# Patient Record
Sex: MALE | Race: BLACK OR AFRICAN AMERICAN | NOT HISPANIC OR LATINO | URBAN - METROPOLITAN AREA
[De-identification: names, ages, dates, MRNs, and addresses within clinical notes are randomized per-mention and may not be internally consistent; named-entity substitution may affect disease eponyms.]

---

## 2018-02-11 ENCOUNTER — INPATIENT (INPATIENT)
Facility: HOSPITAL | Age: 83
LOS: 2 days | Discharge: NURSING, REHAB OR LONG-TERM CARE | End: 2018-02-14
Attending: INTERNAL MEDICINE | Admitting: INTERNAL MEDICINE
Payer: COMMERCIAL

## 2018-02-11 VITALS
OXYGEN SATURATION: 99 % | TEMPERATURE: 98 F | HEIGHT: 68 IN | SYSTOLIC BLOOD PRESSURE: 136 MMHG | WEIGHT: 154.1 LBS | DIASTOLIC BLOOD PRESSURE: 77 MMHG | HEART RATE: 76 BPM | RESPIRATION RATE: 18 BRPM

## 2018-02-11 DIAGNOSIS — Z98.89 OTHER SPECIFIED POSTPROCEDURAL STATES: Chronic | ICD-10-CM

## 2018-02-11 LAB
ALBUMIN SERPL ELPH-MCNC: 3.8 G/DL — SIGNIFICANT CHANGE UP (ref 3.3–5)
ALP SERPL-CCNC: 66 U/L — SIGNIFICANT CHANGE UP (ref 40–120)
ALT FLD-CCNC: 41 U/L — SIGNIFICANT CHANGE UP (ref 12–78)
ANION GAP SERPL CALC-SCNC: 8 MMOL/L — SIGNIFICANT CHANGE UP (ref 5–17)
APPEARANCE UR: CLEAR — SIGNIFICANT CHANGE UP
APTT BLD: 33.9 SEC — SIGNIFICANT CHANGE UP (ref 27.5–37.4)
AST SERPL-CCNC: 35 U/L — SIGNIFICANT CHANGE UP (ref 15–37)
BASOPHILS # BLD AUTO: 0.01 K/UL — SIGNIFICANT CHANGE UP (ref 0–0.2)
BASOPHILS NFR BLD AUTO: 0.2 % — SIGNIFICANT CHANGE UP (ref 0–2)
BILIRUB SERPL-MCNC: 0.6 MG/DL — SIGNIFICANT CHANGE UP (ref 0.2–1.2)
BILIRUB UR-MCNC: NEGATIVE — SIGNIFICANT CHANGE UP
BUN SERPL-MCNC: 36 MG/DL — HIGH (ref 7–23)
CALCIUM SERPL-MCNC: 9.5 MG/DL — SIGNIFICANT CHANGE UP (ref 8.5–10.1)
CHLORIDE SERPL-SCNC: 104 MMOL/L — SIGNIFICANT CHANGE UP (ref 96–108)
CK MB BLD-MCNC: 0.6 % — SIGNIFICANT CHANGE UP (ref 0–3.5)
CK MB CFR SERPL CALC: 2.7 NG/ML — SIGNIFICANT CHANGE UP (ref 0.5–3.6)
CK SERPL-CCNC: 454 U/L — HIGH (ref 26–308)
CO2 SERPL-SCNC: 31 MMOL/L — SIGNIFICANT CHANGE UP (ref 22–31)
COLOR SPEC: YELLOW — SIGNIFICANT CHANGE UP
CREAT SERPL-MCNC: 1.77 MG/DL — HIGH (ref 0.5–1.3)
DIFF PNL FLD: ABNORMAL
EOSINOPHIL # BLD AUTO: 0.05 K/UL — SIGNIFICANT CHANGE UP (ref 0–0.5)
EOSINOPHIL NFR BLD AUTO: 0.8 % — SIGNIFICANT CHANGE UP (ref 0–6)
GLUCOSE SERPL-MCNC: 88 MG/DL — SIGNIFICANT CHANGE UP (ref 70–99)
GLUCOSE UR QL: NEGATIVE MG/DL — SIGNIFICANT CHANGE UP
HCT VFR BLD CALC: 40.9 % — SIGNIFICANT CHANGE UP (ref 39–50)
HGB BLD-MCNC: 12.9 G/DL — LOW (ref 13–17)
IMM GRANULOCYTES NFR BLD AUTO: 0.2 % — SIGNIFICANT CHANGE UP (ref 0–1.5)
INR BLD: 1.09 RATIO — SIGNIFICANT CHANGE UP (ref 0.88–1.16)
KETONES UR-MCNC: NEGATIVE — SIGNIFICANT CHANGE UP
LEUKOCYTE ESTERASE UR-ACNC: NEGATIVE — SIGNIFICANT CHANGE UP
LYMPHOCYTES # BLD AUTO: 1.07 K/UL — SIGNIFICANT CHANGE UP (ref 1–3.3)
LYMPHOCYTES # BLD AUTO: 17.6 % — SIGNIFICANT CHANGE UP (ref 13–44)
MCHC RBC-ENTMCNC: 27.6 PG — SIGNIFICANT CHANGE UP (ref 27–34)
MCHC RBC-ENTMCNC: 31.5 GM/DL — LOW (ref 32–36)
MCV RBC AUTO: 87.4 FL — SIGNIFICANT CHANGE UP (ref 80–100)
MONOCYTES # BLD AUTO: 0.82 K/UL — SIGNIFICANT CHANGE UP (ref 0–0.9)
MONOCYTES NFR BLD AUTO: 13.5 % — SIGNIFICANT CHANGE UP (ref 2–14)
NEUTROPHILS # BLD AUTO: 4.11 K/UL — SIGNIFICANT CHANGE UP (ref 1.8–7.4)
NEUTROPHILS NFR BLD AUTO: 67.7 % — SIGNIFICANT CHANGE UP (ref 43–77)
NITRITE UR-MCNC: NEGATIVE — SIGNIFICANT CHANGE UP
OB PNL STL: POSITIVE
PH UR: 7 — SIGNIFICANT CHANGE UP (ref 5–8)
PLATELET # BLD AUTO: 136 K/UL — LOW (ref 150–400)
POTASSIUM SERPL-MCNC: 3.7 MMOL/L — SIGNIFICANT CHANGE UP (ref 3.5–5.3)
POTASSIUM SERPL-SCNC: 3.7 MMOL/L — SIGNIFICANT CHANGE UP (ref 3.5–5.3)
PROT SERPL-MCNC: 8.3 GM/DL — SIGNIFICANT CHANGE UP (ref 6–8.3)
PROT UR-MCNC: NEGATIVE MG/DL — SIGNIFICANT CHANGE UP
PROTHROM AB SERPL-ACNC: 11.9 SEC — SIGNIFICANT CHANGE UP (ref 9.8–12.7)
RBC # BLD: 4.68 M/UL — SIGNIFICANT CHANGE UP (ref 4.2–5.8)
RBC # FLD: 15.4 % — HIGH (ref 10.3–14.5)
SODIUM SERPL-SCNC: 143 MMOL/L — SIGNIFICANT CHANGE UP (ref 135–145)
SP GR SPEC: 1.01 — SIGNIFICANT CHANGE UP (ref 1.01–1.02)
TROPONIN I SERPL-MCNC: 0.02 NG/ML — SIGNIFICANT CHANGE UP (ref 0.01–0.04)
UROBILINOGEN FLD QL: NEGATIVE MG/DL — SIGNIFICANT CHANGE UP
WBC # BLD: 6.07 K/UL — SIGNIFICANT CHANGE UP (ref 3.8–10.5)
WBC # FLD AUTO: 6.07 K/UL — SIGNIFICANT CHANGE UP (ref 3.8–10.5)

## 2018-02-11 PROCEDURE — 93010 ELECTROCARDIOGRAM REPORT: CPT

## 2018-02-11 PROCEDURE — 70450 CT HEAD/BRAIN W/O DYE: CPT | Mod: 26

## 2018-02-11 PROCEDURE — 99285 EMERGENCY DEPT VISIT HI MDM: CPT

## 2018-02-11 PROCEDURE — 71045 X-RAY EXAM CHEST 1 VIEW: CPT | Mod: 26

## 2018-02-11 RX ORDER — SUCRALFATE 1 G
1 TABLET ORAL
Qty: 0 | Refills: 0 | Status: DISCONTINUED | OUTPATIENT
Start: 2018-02-11 | End: 2018-02-14

## 2018-02-11 RX ORDER — FERROUS SULFATE 325(65) MG
325 TABLET ORAL DAILY
Qty: 0 | Refills: 0 | Status: DISCONTINUED | OUTPATIENT
Start: 2018-02-11 | End: 2018-02-14

## 2018-02-11 RX ORDER — FUROSEMIDE 40 MG
20 TABLET ORAL DAILY
Qty: 0 | Refills: 0 | Status: DISCONTINUED | OUTPATIENT
Start: 2018-02-11 | End: 2018-02-14

## 2018-02-11 RX ORDER — PANTOPRAZOLE SODIUM 20 MG/1
40 TABLET, DELAYED RELEASE ORAL EVERY 12 HOURS
Qty: 0 | Refills: 0 | Status: DISCONTINUED | OUTPATIENT
Start: 2018-02-11 | End: 2018-02-14

## 2018-02-11 RX ORDER — ATORVASTATIN CALCIUM 80 MG/1
40 TABLET, FILM COATED ORAL AT BEDTIME
Qty: 0 | Refills: 0 | Status: DISCONTINUED | OUTPATIENT
Start: 2018-02-11 | End: 2018-02-14

## 2018-02-11 RX ORDER — BICALUTAMIDE 50 MG/1
50 TABLET, FILM COATED ORAL DAILY
Qty: 0 | Refills: 0 | Status: DISCONTINUED | OUTPATIENT
Start: 2018-02-11 | End: 2018-02-14

## 2018-02-11 RX ORDER — SPIRONOLACTONE 25 MG/1
25 TABLET, FILM COATED ORAL DAILY
Qty: 0 | Refills: 0 | Status: DISCONTINUED | OUTPATIENT
Start: 2018-02-11 | End: 2018-02-14

## 2018-02-11 RX ORDER — RAMIPRIL 5 MG
0 CAPSULE ORAL
Qty: 0 | Refills: 0 | COMMUNITY

## 2018-02-11 RX ORDER — SODIUM CHLORIDE 9 MG/ML
1000 INJECTION, SOLUTION INTRAVENOUS
Qty: 0 | Refills: 0 | Status: DISCONTINUED | OUTPATIENT
Start: 2018-02-11 | End: 2018-02-13

## 2018-02-11 RX ADMIN — BICALUTAMIDE 50 MILLIGRAM(S): 50 TABLET, FILM COATED ORAL at 18:56

## 2018-02-11 RX ADMIN — Medication 20 MILLIGRAM(S): at 18:56

## 2018-02-11 RX ADMIN — Medication 325 MILLIGRAM(S): at 18:56

## 2018-02-11 RX ADMIN — Medication 1 GRAM(S): at 18:56

## 2018-02-11 RX ADMIN — SPIRONOLACTONE 25 MILLIGRAM(S): 25 TABLET, FILM COATED ORAL at 18:56

## 2018-02-11 RX ADMIN — SODIUM CHLORIDE 50 MILLILITER(S): 9 INJECTION, SOLUTION INTRAVENOUS at 19:05

## 2018-02-11 RX ADMIN — PANTOPRAZOLE SODIUM 40 MILLIGRAM(S): 20 TABLET, DELAYED RELEASE ORAL at 18:56

## 2018-02-11 NOTE — ED ADULT NURSE NOTE - OBJECTIVE STATEMENT
Pt states he felt dizzy while walking using his walker, fell and passed out. pt denies any chest pain prior to syncopal episode. LE edema noted

## 2018-02-11 NOTE — H&P ADULT - ASSESSMENT
89 years old male who is admitted for syncope, GI bleeding, anemia, AMNA on CKD III  Plan: IV hydration. Anemia and syncope workup

## 2018-02-11 NOTE — ED ADULT TRIAGE NOTE - CHIEF COMPLAINT QUOTE
'I got a little dizzy and fell" report having gotten dizzy and then fell at home, unwitnessed. Denies loc, shortness of breath, chest pain.

## 2018-02-11 NOTE — ED PROVIDER NOTE - OBJECTIVE STATEMENT
89 y/o male with PMH HTN, CHF, duodenal ulcer, anemia here with aide c/o syncopal episode x 1 hour ago. pt states he was walking with his walker when he felt lightheaded as if he was going to pass out. he leaned against the wall and fell to the floor onto his buttocks and banged the back of his head against the wall. no LOC. pt currently has no complaints. denies fever, chills, cp, sob, HA, n/v    ROS: No fever/chills. No eye pain/changes in vision, No ear pain/sore throat/dysphagia, No chest pain/palpitations. No SOB/cough/. No abdominal pain, N/V/D, no black/bloody bm. No dysuria/frequency/discharge, No headache. No Dizziness.    No rashes or breaks in skin. No numbness/tingling/weakness. 89 y/o male with PMH HTN, CHF, duodenal ulcer, anemia here with aide c/o near syncopal episode x 1 hour ago. pt states he was walking with his walker when he felt lightheaded as if he was going to pass out. he leaned against the wall and fell to the floor onto his buttocks and banged the back of his head against the wall. no LOC. pt currently has no complaints. denies fever, chills, cp, sob, HA, n/v    ROS: No fever/chills. No eye pain/changes in vision, No ear pain/sore throat/dysphagia, No chest pain/palpitations. No SOB/cough/. No abdominal pain, N/V/D, no black/bloody bm. No dysuria/frequency/discharge, No headache. No Dizziness.    No rashes or breaks in skin. No numbness/tingling/weakness.

## 2018-02-11 NOTE — ED PROVIDER NOTE - PROGRESS NOTE DETAILS
Gordon: hgb stable and HD stable but occult blood positive, anterior st/t changes, hx of duodenal ulcer with syncope. will admit. Dr. Lay aware, likely needs GI/cardiology during admission

## 2018-02-11 NOTE — H&P ADULT - NSHPPHYSICALEXAM_GEN_ALL_CORE
HEENT: wnl  Neck: no JVD  Chest: wnl  Heart: S1, S2  Abdomen: non-tender  Ext: no edema  Neuro: AAO, no focal deficiet

## 2018-02-11 NOTE — ED PROVIDER NOTE - ATTENDING CONTRIBUTION TO CARE
Gordon: I performed a face to face bedside interview with patient regarding history of present illness, review of symptoms and past medical history. I completed an independent physical exam.  I have discussed patient's plan of care with advanced care provider.   I agree with note as stated above including HISTORY OF PRESENT ILLNESS, HIV, PAST MEDICAL/SURGICAL/FAMILY/SOCIAL HISTORY, ALLERGIES AND HOME MEDICATIONS, REVIEW OF SYSTEMS, PHYSICAL EXAM, MEDICAL DECISION MAKING and any PROGRESS NOTES during the time I functioned as the attending physician for this patient  unless otherwise noted. My brief assessment is as follows: near syncope (not full syncope) felt lightheaded, lost balance and fell, states bumped head, no loc at any point. no a/c, no melena/brbpr. last presentation like this had anemia/duodenal ulcer. now without complaints and feels well, with aid who states pt at baseline, lives with aid (part time) and his son. lungs clear, rrr, neuro intact. labs, occult blood, cth, cxr. ekg nsr without ischemic change. if neg work up with d/c with close f/u. Gordon: I performed a face to face bedside interview with patient regarding history of present illness, review of symptoms and past medical history. I completed an independent physical exam.  I have discussed patient's plan of care with advanced care provider.   I agree with note as stated above including HISTORY OF PRESENT ILLNESS, HIV, PAST MEDICAL/SURGICAL/FAMILY/SOCIAL HISTORY, ALLERGIES AND HOME MEDICATIONS, REVIEW OF SYSTEMS, PHYSICAL EXAM, MEDICAL DECISION MAKING and any PROGRESS NOTES during the time I functioned as the attending physician for this patient  unless otherwise noted. My brief assessment is as follows: near syncope (not full syncope) felt lightheaded, lost balance and fell, states bumped head, no loc at any point. no a/c, no melena/brbpr. last presentation like this had anemia/duodenal ulcer. now without complaints and feels well, with aid who states pt at baseline, lives with aid (part time) and his son. lungs clear, rrr, neuro intact. labs, occult blood, cth, cxr. ekg nsr with st depression/twi v2-v6. . likely admit for near syncope/cardiac work up +/- gi

## 2018-02-11 NOTE — H&P ADULT - NSHPLABSRESULTS_GEN_ALL_CORE
12.9   6.07  )-----------( 136      ( 2018 14:13 )             40.9         143  |  104  |  36<H>  ----------------------------<  88  3.7   |  31  |  1.77<H>    Ca    9.5      2018 14:13    TPro  8.3  /  Alb  3.8  /  TBili  0.6  /  DBili  x   /  AST  35  /  ALT  41  /  AlkPhos  66      PT/INR - ( 2018 14:13 )   PT: 11.9 sec;   INR: 1.09 ratio         PTT - ( 2018 14:13 )  PTT:33.9 sec  Urinalysis Basic - ( 2018 14:22 )    Color: Yellow / Appearance: Clear / S.010 / pH: x  Gluc: x / Ketone: Negative  / Bili: Negative / Urobili: Negative mg/dL   Blood: x / Protein: Negative mg/dL / Nitrite: Negative   Leuk Esterase: Negative / RBC: 25-50 /HPF / WBC x   Sq Epi: x / Non Sq Epi: x / Bacteria: x      CAPILLARY BLOOD GLUCOSE    < from: CT Head No Cont (18 @ 13:16) >    EXAM:  CT BRAIN                            PROCEDURE DATE:  2018          INTERPRETATION:  CT HEAD WITHOUT CONTRAST    CLINICAL STATEMENT: syncope.    COMPARISON: None available.    TECHNIQUE: Noncontrast axial CT head was obtained from the skull base to   vertex.    FINDINGS:  There is no evidence of acute intracranial hemorrhage, mass effect or   midline shift. No CT evidence of acute large territory vascular infarct.   The ventricles and cortical sulci are prominent reflecting parenchymal   volume loss. Scattered hypodensities in the periventricular white matter   are nonspecific, but likely sequela of small vessel ischemic disease.   Intracranial atherosclerotic calcifications are present. Partially empty   sella is noted.    Age-indeterminate lacunar infarct in the right corona radiata.    There appear to be chronic lacunar infarcts in the bilateral thalami.    The visualized paranasal sinuses and mastoid air cells are well aerated.   No displaced calvarial fracture.    IMPRESSION:  No acute intracranial hemorrhage, mass effect or midline shift.    Age-indeterminate lacunar infarct in the right corona radiata.    Further evaluation with MRI may be performed as clinically indicated.                ADDIS THOMAS MD., ATTENDING RADIOLOGIST  This document has been electronically signed. 2018  1:28PM                < end of copied text >

## 2018-02-11 NOTE — ED PROVIDER NOTE - PHYSICAL EXAMINATION
Gen: Alert, NAD  Head: NC, AT, PERRL, EOMI, normal lids/conjunctiva  ENT: B TM WNL, normal hearing, patent oropharynx without erythema/exudate, uvula midline  Neck: +supple, no tenderness/meningismus/JVD, +Trachea midline  Pulm: Bilateral BS, normal resp effort, no wheeze/stridor/retractions  CV: RRR, no M/R/G, +dist pulses  Abd: soft, NT/ND, +BS, no hepatosplenomegaly  Mskel: +edema b/l no erythema/cyanosis  Skin: no rash  Neuro: AAOx3, no sensory/motor deficits, CN 2-12 intact

## 2018-02-11 NOTE — ED PROVIDER NOTE - MEDICAL DECISION MAKING DETAILS
89 y/o male here with syncopal episode. pt currently has no complaints. will do cardiac w/u, CT head 89 y/o male here with near syncopal episode. pt currently has no complaints. will do cardiac w/u, CT head

## 2018-02-12 LAB
ANION GAP SERPL CALC-SCNC: 11 MMOL/L — SIGNIFICANT CHANGE UP (ref 5–17)
BASOPHILS # BLD AUTO: 0.02 K/UL — SIGNIFICANT CHANGE UP (ref 0–0.2)
BASOPHILS NFR BLD AUTO: 0.4 % — SIGNIFICANT CHANGE UP (ref 0–2)
BUN SERPL-MCNC: 36 MG/DL — HIGH (ref 7–23)
CALCIUM SERPL-MCNC: 8.4 MG/DL — LOW (ref 8.5–10.1)
CHLORIDE SERPL-SCNC: 106 MMOL/L — SIGNIFICANT CHANGE UP (ref 96–108)
CO2 SERPL-SCNC: 27 MMOL/L — SIGNIFICANT CHANGE UP (ref 22–31)
CREAT SERPL-MCNC: 1.7 MG/DL — HIGH (ref 0.5–1.3)
CULTURE RESULTS: SIGNIFICANT CHANGE UP
EOSINOPHIL # BLD AUTO: 0.1 K/UL — SIGNIFICANT CHANGE UP (ref 0–0.5)
EOSINOPHIL NFR BLD AUTO: 2.1 % — SIGNIFICANT CHANGE UP (ref 0–6)
FERRITIN SERPL-MCNC: 160 NG/ML — SIGNIFICANT CHANGE UP (ref 30–400)
FOLATE SERPL-MCNC: >20 NG/ML — SIGNIFICANT CHANGE UP (ref 4.8–24.2)
GLUCOSE SERPL-MCNC: 89 MG/DL — SIGNIFICANT CHANGE UP (ref 70–99)
HCT VFR BLD CALC: 34.9 % — LOW (ref 39–50)
HGB BLD-MCNC: 11.3 G/DL — LOW (ref 13–17)
IMM GRANULOCYTES NFR BLD AUTO: 0.2 % — SIGNIFICANT CHANGE UP (ref 0–1.5)
IRON SATN MFR SERPL: 15 % — LOW (ref 16–55)
IRON SATN MFR SERPL: 37 UG/DL — LOW (ref 45–165)
LYMPHOCYTES # BLD AUTO: 1.25 K/UL — SIGNIFICANT CHANGE UP (ref 1–3.3)
LYMPHOCYTES # BLD AUTO: 26.1 % — SIGNIFICANT CHANGE UP (ref 13–44)
MCHC RBC-ENTMCNC: 28.5 PG — SIGNIFICANT CHANGE UP (ref 27–34)
MCHC RBC-ENTMCNC: 32.4 GM/DL — SIGNIFICANT CHANGE UP (ref 32–36)
MCV RBC AUTO: 88.1 FL — SIGNIFICANT CHANGE UP (ref 80–100)
MONOCYTES # BLD AUTO: 0.55 K/UL — SIGNIFICANT CHANGE UP (ref 0–0.9)
MONOCYTES NFR BLD AUTO: 11.5 % — SIGNIFICANT CHANGE UP (ref 2–14)
NEUTROPHILS # BLD AUTO: 2.86 K/UL — SIGNIFICANT CHANGE UP (ref 1.8–7.4)
NEUTROPHILS NFR BLD AUTO: 59.7 % — SIGNIFICANT CHANGE UP (ref 43–77)
NRBC # BLD: 0 /100 WBCS — SIGNIFICANT CHANGE UP (ref 0–0)
OB PNL STL: POSITIVE
PLATELET # BLD AUTO: 127 K/UL — LOW (ref 150–400)
POTASSIUM SERPL-MCNC: 3.5 MMOL/L — SIGNIFICANT CHANGE UP (ref 3.5–5.3)
POTASSIUM SERPL-SCNC: 3.5 MMOL/L — SIGNIFICANT CHANGE UP (ref 3.5–5.3)
PSA FLD-MCNC: 0.03 NG/ML — SIGNIFICANT CHANGE UP (ref 0–4)
RBC # BLD: 3.96 M/UL — LOW (ref 4.2–5.8)
RBC # BLD: 3.96 M/UL — LOW (ref 4.2–5.8)
RBC # FLD: 15.7 % — HIGH (ref 10.3–14.5)
RETICS #: 50.7 K/UL — SIGNIFICANT CHANGE UP (ref 25–125)
RETICS/RBC NFR: 1.3 % — SIGNIFICANT CHANGE UP (ref 0.5–2.5)
SODIUM SERPL-SCNC: 144 MMOL/L — SIGNIFICANT CHANGE UP (ref 135–145)
SPECIMEN SOURCE: SIGNIFICANT CHANGE UP
TIBC SERPL-MCNC: 240 UG/DL — SIGNIFICANT CHANGE UP (ref 220–430)
TSH SERPL-MCNC: 0.38 UIU/ML — SIGNIFICANT CHANGE UP (ref 0.36–3.74)
UIBC SERPL-MCNC: 203 UG/DL — SIGNIFICANT CHANGE UP (ref 110–370)
VIT B12 SERPL-MCNC: 992 PG/ML — SIGNIFICANT CHANGE UP (ref 232–1245)
WBC # BLD: 4.79 K/UL — SIGNIFICANT CHANGE UP (ref 3.8–10.5)
WBC # FLD AUTO: 4.79 K/UL — SIGNIFICANT CHANGE UP (ref 3.8–10.5)

## 2018-02-12 PROCEDURE — 93306 TTE W/DOPPLER COMPLETE: CPT | Mod: 26

## 2018-02-12 RX ADMIN — Medication 1 GRAM(S): at 00:45

## 2018-02-12 RX ADMIN — Medication 325 MILLIGRAM(S): at 12:43

## 2018-02-12 RX ADMIN — ATORVASTATIN CALCIUM 40 MILLIGRAM(S): 80 TABLET, FILM COATED ORAL at 00:44

## 2018-02-12 RX ADMIN — BICALUTAMIDE 50 MILLIGRAM(S): 50 TABLET, FILM COATED ORAL at 14:36

## 2018-02-12 RX ADMIN — Medication 1 GRAM(S): at 06:17

## 2018-02-12 RX ADMIN — Medication 1 GRAM(S): at 23:23

## 2018-02-12 RX ADMIN — Medication 1 GRAM(S): at 12:44

## 2018-02-12 RX ADMIN — Medication 1 GRAM(S): at 18:42

## 2018-02-12 RX ADMIN — SPIRONOLACTONE 25 MILLIGRAM(S): 25 TABLET, FILM COATED ORAL at 12:43

## 2018-02-12 RX ADMIN — ATORVASTATIN CALCIUM 40 MILLIGRAM(S): 80 TABLET, FILM COATED ORAL at 23:23

## 2018-02-12 RX ADMIN — PANTOPRAZOLE SODIUM 40 MILLIGRAM(S): 20 TABLET, DELAYED RELEASE ORAL at 06:17

## 2018-02-12 RX ADMIN — PANTOPRAZOLE SODIUM 40 MILLIGRAM(S): 20 TABLET, DELAYED RELEASE ORAL at 18:42

## 2018-02-12 NOTE — PHYSICAL THERAPY INITIAL EVALUATION ADULT - CRITERIA FOR SKILLED THERAPEUTIC INTERVENTIONS
rehab potential/functional limitations in following categories/impairments found/risk reduction/prevention/therapy frequency

## 2018-02-12 NOTE — CONSULT NOTE ADULT - SUBJECTIVE AND OBJECTIVE BOX
HPI:  89 years old male who comes to ER for syncope episode. Anemic, positive  stool occult blood test (2018 17:21)  ------------------------------------------------------------------------------------------------------------------------------------------  The patient states that on the day of discharge, he felt dizzy, and slipped to the floor. Work up revealed a slight anemia and hem (+) stool.  The patient denies melena, hematochezia, hematemesis, nausea, vomiting, abdominal pain, constipation, diarrhea, or change in bowel movements Weight loss (+). He states that he had a gastric ulcer approximately 5 years. Medication list includes a PPI and Carafate but the patient is unsure what he is taking. He is unsure if he had a colonoscopy.      PAST MEDICAL & SURGICAL HISTORY:  HTN (hypertension)  History of tonsillectomy  Prostate cancer      MEDICATIONS  (STANDING):  atorvastatin 40 milliGRAM(s) Oral at bedtime  bicalutamide 50 milliGRAM(s) Oral daily  dextrose 5% + sodium chloride 0.45%. 1000 milliLiter(s) (50 mL/Hr) IV Continuous <Continuous>  ferrous    sulfate 325 milliGRAM(s) Oral daily  furosemide    Tablet 20 milliGRAM(s) Oral daily  pantoprazole  Injectable 40 milliGRAM(s) IV Push every 12 hours  spironolactone 25 milliGRAM(s) Oral daily  sucralfate suspension 1 Gram(s) Oral four times a day    MEDICATIONS  (PRN):      Allergies    No Known Allergies    Intolerances        FAMILY HISTORY:      REVIEW OF SYSTEMS:    CONSTITUTIONAL: No fever, weight loss, or fatigue  EYES: No eye pain, visual disturbances, or discharge  ENMT:  No difficulty hearing, tinnitus, vertigo; No sinus or throat pain  NECK: No pain or stiffness  BREASTS: No pain, masses, or nipple discharge  RESPIRATORY: No cough, wheezing, chills or hemoptysis; No shortness of breath  CARDIOVASCULAR: No chest pain, palpitations, dizziness, or leg swelling  GASTROINTESTINAL: See above  GENITOURINARY: No dysuria, frequency, hematuria, or incontinence  NEUROLOGICAL: No headaches, memory loss, loss of strength, numbness, or tremors  SKIN: No itching, burning, rashes, or lesions   LYMPH NODES: No enlarged glands  ENDOCRINE: No heat or cold intolerance; No hair loss  MUSCULOSKELETAL: No joint pain or swelling; No muscle, back, or extremity pain  PSYCHIATRIC: No depression, anxiety, mood swings, or difficulty sleeping  HEME/LYMPH: No easy bruising, or bleeding gums  ALLERGY AND IMMUNOLOGIC: No hives or eczema          SOCIAL HISTORY:    FAMILY HISTORY:      Vital Signs Last 24 Hrs  T(C): 36.1 (2018 11:33), Max: 36.9 (2018 15:38)  T(F): 97 (2018 11:33), Max: 98.5 (2018 15:38)  HR: 62 (2018 11:33) (62 - 83)  BP: 107/54 (2018 11:33) (102/62 - 124/79)  BP(mean): --  RR: 16 (2018 11:33) (16 - 18)  SpO2: 99% (2018 11:33) (95% - 99%)    PHYSICAL EXAM:    GENERAL: NAD, well-groomed, well-developed  HEAD:  Atraumatic, Normocephalic  EYES: EOMI, PERRLA, conjunctiva and sclera clear  NECK: Supple, No JVD, Normal thyroid  NERVOUS SYSTEM:  Alert & Oriented X3, Good concentration;   CHEST/LUNG: Clear to percussion bilaterally; No rales, rhonchi, wheezing, or rubs  HEART: Regular rate and rhythm; No murmurs, rubs, or gallops  ABDOMEN: Soft, Nontender, Nondistended; Bowel sounds present  EXTREMITIES:  2+ Peripheral Pulses, No clubbing, cyanosis, or edema  LYMPH: No lymphadenopathy noted   RECTAL: No masses, prostate normal size and smooth, brown stool heme (-)   SKIN: No rashes or lesions    LABS:                        11.3   4.79  )-----------( 127      ( 2018 07:22 )             34.9       CBC:   @ 07:22  WBC  4.79  HGB 11.3  HCT 34.9 Plate 127  MCV 88.1   @ 14:13  WBC  6.07  HGB 12.9  HCT 40.9 Plate 136  MCV 87.4           2018 07:22    144    |  106    |  36     ----------------------------<  89     3.5     |  27     |  1.70   2018 14:13    143    |  104    |  36     ----------------------------<  88     3.7     |  31     |  1.77     Ca    8.4        2018 07:22  Ca    9.5        2018 14:13    TPro  8.3    /  Alb  3.8    /  TBili  0.6    /  DBili  x      /  AST  35     /  ALT  41     /  AlkPhos  66     2018 14:13    PT/INR - ( 2018 14:13 )   PT: 11.9 sec;   INR: 1.09 ratio         PTT - ( 2018 14:13 )  PTT:33.9 sec  Urinalysis Basic - ( 2018 14:22 )    Color: Yellow / Appearance: Clear / S.010 / pH: x  Gluc: x / Ketone: Negative  / Bili: Negative / Urobili: Negative mg/dL   Blood: x / Protein: Negative mg/dL / Nitrite: Negative   Leuk Esterase: Negative / RBC: 25-50 /HPF / WBC x   Sq Epi: x / Non Sq Epi: x / Bacteria: x          RADIOLOGY & ADDITIONAL STUDIES: HPI:  89 years old male who comes to ER for syncope episode. Anemic, positive  stool occult blood test (2018 17:21)  ------------------------------------------------------------------------------------------------------------------------------------------  The patient states that on the day of discharge, he felt dizzy, and slipped to the floor. Work up revealed a slight anemia and hem (+) stool.  The patient denies melena, hematochezia, hematemesis, nausea, vomiting, abdominal pain, constipation, diarrhea, or change in bowel movements Weight loss (+). He states that he had a gastric ulcer approximately 5 years. Medication list includes a PPI and Carafate but the patient is unsure what he is taking. He is unsure if he ever had a colonoscopy.      PAST MEDICAL & SURGICAL HISTORY:  HTN (hypertension)  History of tonsillectomy  Prostate cancer      MEDICATIONS  (STANDING):  atorvastatin 40 milliGRAM(s) Oral at bedtime  bicalutamide 50 milliGRAM(s) Oral daily  dextrose 5% + sodium chloride 0.45%. 1000 milliLiter(s) (50 mL/Hr) IV Continuous <Continuous>  ferrous    sulfate 325 milliGRAM(s) Oral daily  furosemide    Tablet 20 milliGRAM(s) Oral daily  pantoprazole  Injectable 40 milliGRAM(s) IV Push every 12 hours  spironolactone 25 milliGRAM(s) Oral daily  sucralfate suspension 1 Gram(s) Oral four times a day    MEDICATIONS  (PRN):      Allergies    No Known Allergies    Intolerances        FAMILY HISTORY:      REVIEW OF SYSTEMS:    CONSTITUTIONAL: No fever, weight loss, or fatigue  EYES: No eye pain, visual disturbances, or discharge  ENMT:  No difficulty hearing, tinnitus, vertigo; No sinus or throat pain  NECK: No pain or stiffness  BREASTS: No pain, masses, or nipple discharge  RESPIRATORY: No cough, wheezing, chills or hemoptysis; No shortness of breath  CARDIOVASCULAR: No chest pain, palpitations, dizziness, or leg swelling  GASTROINTESTINAL: See above  GENITOURINARY: No dysuria, frequency, hematuria, or incontinence  NEUROLOGICAL: No headaches, memory loss, loss of strength, numbness, or tremors  SKIN: No itching, burning, rashes, or lesions   LYMPH NODES: No enlarged glands  ENDOCRINE: No heat or cold intolerance; No hair loss  MUSCULOSKELETAL: No joint pain or swelling; No muscle, back, or extremity pain  PSYCHIATRIC: No depression, anxiety, mood swings, or difficulty sleeping  HEME/LYMPH: No easy bruising, or bleeding gums  ALLERGY AND IMMUNOLOGIC: No hives or eczema          SOCIAL HISTORY:    FAMILY HISTORY:      Vital Signs Last 24 Hrs  T(C): 36.1 (2018 11:33), Max: 36.9 (2018 15:38)  T(F): 97 (2018 11:33), Max: 98.5 (2018 15:38)  HR: 62 (2018 11:33) (62 - 83)  BP: 107/54 (2018 11:33) (102/62 - 124/79)  BP(mean): --  RR: 16 (2018 11:33) (16 - 18)  SpO2: 99% (2018 11:33) (95% - 99%)    PHYSICAL EXAM:    GENERAL: NAD, well-groomed, well-developed  HEAD:  Atraumatic, Normocephalic  EYES: EOMI, PERRLA, conjunctiva and sclera clear  NECK: Supple, No JVD, Normal thyroid  NERVOUS SYSTEM:  Alert & Oriented X3, Good concentration;   CHEST/LUNG: Clear to percussion bilaterally; No rales, rhonchi, wheezing, or rubs  HEART: Regular rate and rhythm; No murmurs, rubs, or gallops  ABDOMEN: Soft, Nontender, Nondistended; Bowel sounds present  EXTREMITIES:  2+ Peripheral Pulses, No clubbing, cyanosis, or edema  LYMPH: No lymphadenopathy noted   RECTAL: No masses, prostate normal size and smooth, brown stool heme (-)   SKIN: No rashes or lesions    LABS:                        11.3   4.79  )-----------( 127      ( 2018 07:22 )             34.9       CBC:   @ 07:22  WBC  4.79  HGB 11.3  HCT 34.9 Plate 127  MCV 88.1   @ 14:13  WBC  6.07  HGB 12.9  HCT 40.9 Plate 136  MCV 87.4           2018 07:22    144    |  106    |  36     ----------------------------<  89     3.5     |  27     |  1.70   2018 14:13    143    |  104    |  36     ----------------------------<  88     3.7     |  31     |  1.77     Ca    8.4        2018 07:22  Ca    9.5        2018 14:13    TPro  8.3    /  Alb  3.8    /  TBili  0.6    /  DBili  x      /  AST  35     /  ALT  41     /  AlkPhos  66     2018 14:13    PT/INR - ( 2018 14:13 )   PT: 11.9 sec;   INR: 1.09 ratio         PTT - ( 2018 14:13 )  PTT:33.9 sec  Urinalysis Basic - ( 2018 14:22 )    Color: Yellow / Appearance: Clear / S.010 / pH: x  Gluc: x / Ketone: Negative  / Bili: Negative / Urobili: Negative mg/dL   Blood: x / Protein: Negative mg/dL / Nitrite: Negative   Leuk Esterase: Negative / RBC: 25-50 /HPF / WBC x   Sq Epi: x / Non Sq Epi: x / Bacteria: x          RADIOLOGY & ADDITIONAL STUDIES: HPI:  89 years old male who comes to ER for syncope episode. Anemic, positive  stool occult blood test (2018 17:21)  ------------------------------------------------------------------------------------------------------------------------------------------  The patient states that on the day of discharge, he felt dizzy, and slipped to the floor. Work up revealed a slight anemia and hem (+) stool.  The patient denies melena, hematochezia, hematemesis, nausea, vomiting, abdominal pain, constipation, diarrhea, or change in bowel movements Weight loss (+). He states that he had a gastric ulcer approximately 5 years. Medication list includes a PPI and Carafate but the patient is unsure what he is taking. He is unsure if he ever had a colonoscopy.  Patient w/o any GI complaints. Tolerating liquids.      PAST MEDICAL & SURGICAL HISTORY:  HTN (hypertension)  History of tonsillectomy  Prostate cancer      MEDICATIONS  (STANDING):  atorvastatin 40 milliGRAM(s) Oral at bedtime  bicalutamide 50 milliGRAM(s) Oral daily  dextrose 5% + sodium chloride 0.45%. 1000 milliLiter(s) (50 mL/Hr) IV Continuous <Continuous>  ferrous    sulfate 325 milliGRAM(s) Oral daily  furosemide    Tablet 20 milliGRAM(s) Oral daily  pantoprazole  Injectable 40 milliGRAM(s) IV Push every 12 hours  spironolactone 25 milliGRAM(s) Oral daily  sucralfate suspension 1 Gram(s) Oral four times a day    MEDICATIONS  (PRN):      Allergies    No Known Allergies    Intolerances        FAMILY HISTORY:      REVIEW OF SYSTEMS:    CONSTITUTIONAL: No fever, weight loss, or fatigue  EYES: No eye pain, visual disturbances, or discharge  ENMT:  No difficulty hearing, tinnitus, vertigo; No sinus or throat pain  NECK: No pain or stiffness  BREASTS: No pain, masses, or nipple discharge  RESPIRATORY: No cough, wheezing, chills or hemoptysis; No shortness of breath  CARDIOVASCULAR: No chest pain, palpitations, dizziness, or leg swelling  GASTROINTESTINAL: See above  GENITOURINARY: No dysuria, frequency, hematuria, or incontinence  NEUROLOGICAL: No headaches, memory loss, loss of strength, numbness, or tremors  SKIN: No itching, burning, rashes, or lesions   LYMPH NODES: No enlarged glands  ENDOCRINE: No heat or cold intolerance; No hair loss  MUSCULOSKELETAL: No joint pain or swelling; No muscle, back, or extremity pain  PSYCHIATRIC: No depression, anxiety, mood swings, or difficulty sleeping  HEME/LYMPH: No easy bruising, or bleeding gums  ALLERGY AND IMMUNOLOGIC: No hives or eczema          SOCIAL HISTORY:    FAMILY HISTORY:      Vital Signs Last 24 Hrs  T(C): 36.1 (2018 11:33), Max: 36.9 (2018 15:38)  T(F): 97 (2018 11:33), Max: 98.5 (2018 15:38)  HR: 62 (2018 11:33) (62 - 83)  BP: 107/54 (2018 11:33) (102/62 - 124/79)  BP(mean): --  RR: 16 (2018 11:33) (16 - 18)  SpO2: 99% (2018 11:33) (95% - 99%)    PHYSICAL EXAM:    GENERAL: NAD, well-groomed, well-developed  HEAD:  Atraumatic, Normocephalic  EYES: EOMI, PERRLA, conjunctiva and sclera clear  NECK: Supple, No JVD, Normal thyroid  NERVOUS SYSTEM:  Alert & Oriented X3, Good concentration;   CHEST/LUNG: Clear to percussion bilaterally; No rales, rhonchi, wheezing, or rubs  HEART: Regular rate and rhythm; No murmurs, rubs, or gallops  ABDOMEN: Soft, Nontender, Nondistended; Bowel sounds present  EXTREMITIES:  2+ Peripheral Pulses, No clubbing, cyanosis, or edema  LYMPH: No lymphadenopathy noted   RECTAL: No masses, prostate normal size and smooth, brown stool heme (-)   SKIN: No rashes or lesions    LABS:                        11.3   4.79  )-----------( 127      ( 2018 07:22 )             34.9       CBC:   @ 07:22  WBC  4.79  HGB 11.3  HCT 34.9 Plate 127  MCV 88.1   @ 14:13  WBC  6.07  HGB 12.9  HCT 40.9 Plate 136  MCV 87.4           2018 07:22    144    |  106    |  36     ----------------------------<  89     3.5     |  27     |  1.70   2018 14:13    143    |  104    |  36     ----------------------------<  88     3.7     |  31     |  1.77     Ca    8.4        2018 07:22  Ca    9.5        2018 14:13    TPro  8.3    /  Alb  3.8    /  TBili  0.6    /  DBili  x      /  AST  35     /  ALT  41     /  AlkPhos  66     2018 14:13    PT/INR - ( 2018 14:13 )   PT: 11.9 sec;   INR: 1.09 ratio         PTT - ( 2018 14:13 )  PTT:33.9 sec  Urinalysis Basic - ( 2018 14:22 )    Color: Yellow / Appearance: Clear / S.010 / pH: x  Gluc: x / Ketone: Negative  / Bili: Negative / Urobili: Negative mg/dL   Blood: x / Protein: Negative mg/dL / Nitrite: Negative   Leuk Esterase: Negative / RBC: 25-50 /HPF / WBC x   Sq Epi: x / Non Sq Epi: x / Bacteria: x          RADIOLOGY & ADDITIONAL STUDIES:

## 2018-02-12 NOTE — CONSULT NOTE ADULT - ASSESSMENT
Subjective Complaints:   s/p syncope  no s eziure         REVIEW OF SYSTEMS:  Eyes:  Good vision, no reported pain  ENT:  No sore throat, pain, runny nose, dysphagia  CV:  No pain, palpitatioins, hypo/hypertension  Resp:  No dyspnea, cough, tachypnea, wheezing  GI:  No pain, nausea, vomiting, diarrhea, constipatiion  Muscle:  No pain, weakness  Neuro:  No weakness, tingling, memory problems  Psych:  No fatigue, insomnia, mood problems, depression  Endocrine:  No polyuria, polydypsia, cold/heat intolerance    Vital Signs Last 24 Hrs  T(C): 36.2 (2018 16:30), Max: 36.8 (2018 22:37)  T(F): 97.1 (2018 16:30), Max: 98.3 (2018 22:37)  HR: 58 (2018 16:30) (58 - 69)  BP: 103/57 (2018 16:30) (102/62 - 116/68)  BP(mean): --  RR: 16 (2018 16:30) (16 - 16)  SpO2: 99% (2018 16:30) (95% - 99%)    GENERAL PHYSICAL EXAM:  General:  Appears stated age, well-groomed, well-nourished, no distress  HEENT:  NC/AT, patent nares w/ pink mucosa, OP clear w/o lesions, PERRL, EOMI, conjunctivae clear, no thyromegaly, nodules, adenopathy, no JVD  Chest:  Full & symmetric excursion, no increased effort, breath sounds clear  Cardiovascular:  Regular rhythm, S1, S2, no murmur/rub/S3/S4, no carotid/femoral/abdominal bruit, radial/pedal pulses 2+, no edema  Abdomen:  Soft, non-tender, non-distended, normoactive bowel sounds, no HSM  Extremities:  Gait & station:   Digits:   Nails:   Joints, Bones, Muscles:   ROM:   Stability:  Skin:  No rash/erythema/ecchymoses/petechiae/wounds/abscess/warm/dry  Musculoskeletal:  Full ROM in all joints w/o swelling/tenderness/effusion    NEUROLOGICAL EXAM:  HENT:  Normocephalic head; atraumatic head.  Neck supple.  ENT: normal looking.  Mental State:    Alert.  Fully oriented to person, place and date.  Coherent.  Speech clear and intact.  Cooperative.  Responds appropriately.    Cranial Nerves:  II-XII:   Pupils round and reactive to light and accommodation.  Extraocular movements full.  Visual fields full (no homonymous hemianopsia).  Visual acuity wnl.  Facial symmetry intact.  Tongue midline.  Motor Functions:  Moves all extremities.  legs  3/5    legs swolloen     Sensory Functions:   Intact to touch and pinprick to face and extremities.    Reflexes:  Deep tendon reflexes normoactive to biceps, knees and ankles.  Babinski absent (present).  Cerebellar Testing:    Finger to nose intact.  Nystagmus absent.  Neurovascular: Carotid auscultation full without bruits.      LABS:                        11.3   4.79  )-----------( 127      ( 2018 07:22 )             34.9     02-12    144  |  106  |  36<H>  ----------------------------<  89  3.5   |  27  |  1.70<H>    Ca    8.4<L>      2018 07:22    TPro  8.3  /  Alb  3.8  /  TBili  0.6  /  DBili  x   /  AST  35  /  ALT  41  /  AlkPhos  66  02-11    PT/INR - ( 2018 14:13 )   PT: 11.9 sec;   INR: 1.09 ratio         PTT - ( 2018 14:13 )  PTT:33.9 sec  Urinalysis Basic - ( 2018 14:22 )    Color: Yellow / Appearance: Clear / S.010 / pH: x  Gluc: x / Ketone: Negative  / Bili: Negative / Urobili: Negative mg/dL   Blood: x / Protein: Negative mg/dL / Nitrite: Negative   Leuk Esterase: Negative / RBC: 25-50 /HPF / WBC x   Sq Epi: x / Non Sq Epi: x / Bacteria: x     ass= awake alert speech fluent  s/p syncope  arm leg 4/5 legs swollen  gi bleed  gi eval  for [facility] echo doppler no seziure      RADIOLOGY & ADDITIONAL STUDIES:        Recommendations:  Brain MRI.  Carotid doppler.  Echocardiogram.  EEG.   DVT prophylaxis as ordered. will follow   Medications:

## 2018-02-12 NOTE — CONSULT NOTE ADULT - ASSESSMENT
HPI:  89 years old male who comes to ER for syncope episode. Anemic, positive  stool occult blood test (11 Feb 2018 17:21)  ------------------------------------------------------------------------------------------------------------------------------------------  The patient states that on the day of discharge, he felt dizzy, and slipped to the floor. Work up revealed a slight anemia and hem (+) stool.  The patient denies melena, hematochezia, hematemesis, nausea, vomiting, abdominal pain, constipation, diarrhea, or change in bowel movements Weight loss (+). He states that he had a gastric ulcer approximately 5 years. Medication list includes a PPI and Carafate but the patient is unsure what he is taking. He is unsure if he ever had a colonoscopy.  --------Anemia HPI:  89 years old male who comes to ER for syncope episode. Anemic, positive  stool occult blood test (11 Feb 2018 17:21)  ------------------------------------------------------------------------------------------------------------------------------------------  The patient states that on the day of discharge, he felt dizzy, and slipped to the floor. Work up revealed a slight anemia and hem (+) stool.  The patient denies melena, hematochezia, hematemesis, nausea, vomiting, abdominal pain, constipation, diarrhea, or change in bowel movements Weight loss (+). He states that he had a gastric ulcer approximately 5 years. Medication list includes a PPI and Carafate but the patient is unsure what he is taking. He is unsure if he ever had a colonoscopy.  --------Anemia, Heme (+), low iron saturation, normal ferritin - Discussed importance of GI W/U with patient. After a thorough explanation, the patient does not want any GI work up. Left message to spouse to call me.

## 2018-02-13 DIAGNOSIS — K92.2 GASTROINTESTINAL HEMORRHAGE, UNSPECIFIED: ICD-10-CM

## 2018-02-13 LAB
TSH SERPL-MCNC: 1.09 UIU/ML — SIGNIFICANT CHANGE UP (ref 0.36–3.74)
VIT B12 SERPL-MCNC: 1056 PG/ML — SIGNIFICANT CHANGE UP (ref 232–1245)

## 2018-02-13 PROCEDURE — 70551 MRI BRAIN STEM W/O DYE: CPT | Mod: 26

## 2018-02-13 PROCEDURE — 93880 EXTRACRANIAL BILAT STUDY: CPT | Mod: 26

## 2018-02-13 RX ADMIN — ATORVASTATIN CALCIUM 40 MILLIGRAM(S): 80 TABLET, FILM COATED ORAL at 21:56

## 2018-02-13 RX ADMIN — SODIUM CHLORIDE 50 MILLILITER(S): 9 INJECTION, SOLUTION INTRAVENOUS at 17:05

## 2018-02-13 RX ADMIN — Medication 20 MILLIGRAM(S): at 06:10

## 2018-02-13 RX ADMIN — BICALUTAMIDE 50 MILLIGRAM(S): 50 TABLET, FILM COATED ORAL at 15:17

## 2018-02-13 RX ADMIN — PANTOPRAZOLE SODIUM 40 MILLIGRAM(S): 20 TABLET, DELAYED RELEASE ORAL at 06:10

## 2018-02-13 RX ADMIN — Medication 1 GRAM(S): at 17:05

## 2018-02-13 RX ADMIN — PANTOPRAZOLE SODIUM 40 MILLIGRAM(S): 20 TABLET, DELAYED RELEASE ORAL at 17:05

## 2018-02-13 RX ADMIN — SPIRONOLACTONE 25 MILLIGRAM(S): 25 TABLET, FILM COATED ORAL at 06:10

## 2018-02-13 RX ADMIN — Medication 1 GRAM(S): at 06:10

## 2018-02-13 RX ADMIN — Medication 1 GRAM(S): at 23:37

## 2018-02-13 NOTE — CONSULT NOTE ADULT - SUBJECTIVE AND OBJECTIVE BOX
REASON FOR CONSULTATION:  Anemia.  INTERVAL HISTORY:      Allergies    No Known Allergies    Intolerances        MEDICATIONS  (STANDING):  atorvastatin 40 milliGRAM(s) Oral at bedtime  bicalutamide 50 milliGRAM(s) Oral daily  dextrose 5% + sodium chloride 0.45%. 1000 milliLiter(s) (50 mL/Hr) IV Continuous <Continuous>  ferrous    sulfate 325 milliGRAM(s) Oral daily  furosemide    Tablet 20 milliGRAM(s) Oral daily  pantoprazole  Injectable 40 milliGRAM(s) IV Push every 12 hours  spironolactone 25 milliGRAM(s) Oral daily  sucralfate suspension 1 Gram(s) Oral four times a day    MEDICATIONS  (PRN):      Vital Signs Last 24 Hrs  T(C): 36.3 (2018 05:44), Max: 36.3 (2018 00:27)  T(F): 97.4 (2018 05:44), Max: 97.4 (2018 05:44)  HR: 57 (2018 05:44) (57 - 62)  BP: 117/68 (2018 05:44) (103/57 - 117/68)  BP(mean): --  RR: 16 (2018 05:44) (16 - 16)  SpO2: 99% (2018 05:44) (98% - 99%)    PHYSICAL EXAM:    EYES: EOMI, PERRLA, conjunctiva and sclera clear  CHEST/LUNG: Clear to percussion bilaterally;   HEART: Regular rate and rhythm;   ABDOMEN: Soft, Nontender, Nondistended;   LYMPH: No lymphadenopathy noted.  Edema +++        LABS:                        11.3   4.79  )-----------( 127      ( 2018 07:22 )             34.9     02-12    144  |  106  |  36<H>  ----------------------------<  89  3.5   |  27  |  1.70<H>    Ca    8.4<L>      2018 07:22    TPro  8.3  /  Alb  3.8  /  TBili  0.6  /  DBili  x   /  AST  35  /  ALT  41  /  AlkPhos  66  02-11    PT/INR - ( 2018 14:13 )   PT: 11.9 sec;   INR: 1.09 ratio         PTT - ( 2018 14:13 )  PTT:33.9 sec  Urinalysis Basic - ( 2018 14:22 )    Color: Yellow / Appearance: Clear / S.010 / pH: x  Gluc: x / Ketone: Negative  / Bili: Negative / Urobili: Negative mg/dL   Blood: x / Protein: Negative mg/dL / Nitrite: Negative   Leuk Esterase: Negative / RBC: 25-50 /HPF / WBC x   Sq Epi: x / Non Sq Epi: x / Bacteria: x          RADIOLOGY & ADDITIONAL STUDIES:    PATHOLOGY:

## 2018-02-14 ENCOUNTER — TRANSCRIPTION ENCOUNTER (OUTPATIENT)
Age: 83
End: 2018-02-14

## 2018-02-14 VITALS
DIASTOLIC BLOOD PRESSURE: 72 MMHG | HEART RATE: 68 BPM | OXYGEN SATURATION: 99 % | RESPIRATION RATE: 18 BRPM | SYSTOLIC BLOOD PRESSURE: 119 MMHG | TEMPERATURE: 98 F

## 2018-02-14 LAB
ALBUMIN SERPL ELPH-MCNC: 2.8 G/DL — LOW (ref 3.3–5)
ALP SERPL-CCNC: 60 U/L — SIGNIFICANT CHANGE UP (ref 40–120)
ALT FLD-CCNC: 30 U/L — SIGNIFICANT CHANGE UP (ref 12–78)
ANION GAP SERPL CALC-SCNC: 10 MMOL/L — SIGNIFICANT CHANGE UP (ref 5–17)
AST SERPL-CCNC: 30 U/L — SIGNIFICANT CHANGE UP (ref 15–37)
BILIRUB SERPL-MCNC: 0.3 MG/DL — SIGNIFICANT CHANGE UP (ref 0.2–1.2)
BUN SERPL-MCNC: 22 MG/DL — SIGNIFICANT CHANGE UP (ref 7–23)
CALCIUM SERPL-MCNC: 8.1 MG/DL — LOW (ref 8.5–10.1)
CHLORIDE SERPL-SCNC: 105 MMOL/L — SIGNIFICANT CHANGE UP (ref 96–108)
CO2 SERPL-SCNC: 24 MMOL/L — SIGNIFICANT CHANGE UP (ref 22–31)
CREAT SERPL-MCNC: 1.58 MG/DL — HIGH (ref 0.5–1.3)
GLUCOSE BLDC GLUCOMTR-MCNC: 106 MG/DL — HIGH (ref 70–99)
GLUCOSE SERPL-MCNC: 109 MG/DL — HIGH (ref 70–99)
HCT VFR BLD CALC: 36.7 % — LOW (ref 39–50)
HGB BLD-MCNC: 11.8 G/DL — LOW (ref 13–17)
MCHC RBC-ENTMCNC: 28 PG — SIGNIFICANT CHANGE UP (ref 27–34)
MCHC RBC-ENTMCNC: 32.2 GM/DL — SIGNIFICANT CHANGE UP (ref 32–36)
MCV RBC AUTO: 87.2 FL — SIGNIFICANT CHANGE UP (ref 80–100)
NRBC # BLD: 0 /100 WBCS — SIGNIFICANT CHANGE UP (ref 0–0)
PLATELET # BLD AUTO: 127 K/UL — LOW (ref 150–400)
POTASSIUM SERPL-MCNC: 3.8 MMOL/L — SIGNIFICANT CHANGE UP (ref 3.5–5.3)
POTASSIUM SERPL-SCNC: 3.8 MMOL/L — SIGNIFICANT CHANGE UP (ref 3.5–5.3)
PROT SERPL-MCNC: 6.7 GM/DL — SIGNIFICANT CHANGE UP (ref 6–8.3)
RBC # BLD: 4.21 M/UL — SIGNIFICANT CHANGE UP (ref 4.2–5.8)
RBC # FLD: 14.9 % — HIGH (ref 10.3–14.5)
SODIUM SERPL-SCNC: 139 MMOL/L — SIGNIFICANT CHANGE UP (ref 135–145)
WBC # BLD: 4.85 K/UL — SIGNIFICANT CHANGE UP (ref 3.8–10.5)
WBC # FLD AUTO: 4.85 K/UL — SIGNIFICANT CHANGE UP (ref 3.8–10.5)

## 2018-02-14 PROCEDURE — 70450 CT HEAD/BRAIN W/O DYE: CPT | Mod: 26

## 2018-02-14 RX ORDER — SPIRONOLACTONE 25 MG/1
1 TABLET, FILM COATED ORAL
Qty: 0 | Refills: 0 | DISCHARGE
Start: 2018-02-14

## 2018-02-14 RX ORDER — BICALUTAMIDE 50 MG/1
1 TABLET, FILM COATED ORAL
Qty: 0 | Refills: 0 | COMMUNITY

## 2018-02-14 RX ORDER — SPIRONOLACTONE 25 MG/1
0 TABLET, FILM COATED ORAL
Qty: 0 | Refills: 0 | COMMUNITY

## 2018-02-14 RX ORDER — PANTOPRAZOLE SODIUM 20 MG/1
40 TABLET, DELAYED RELEASE ORAL
Qty: 0 | Refills: 0 | COMMUNITY
Start: 2018-02-14

## 2018-02-14 RX ORDER — FUROSEMIDE 40 MG
1 TABLET ORAL
Qty: 0 | Refills: 0 | COMMUNITY

## 2018-02-14 RX ORDER — FUROSEMIDE 40 MG
1 TABLET ORAL
Qty: 0 | Refills: 0 | COMMUNITY
Start: 2018-02-14

## 2018-02-14 RX ORDER — SUCRALFATE 1 G
10 TABLET ORAL
Qty: 0 | Refills: 0 | DISCHARGE
Start: 2018-02-14

## 2018-02-14 RX ORDER — BENZOYL PEROXIDE MICRONIZED 5.8 %
1 TOWELETTE (EA) TOPICAL
Qty: 0 | Refills: 0 | COMMUNITY

## 2018-02-14 RX ORDER — BICALUTAMIDE 50 MG/1
1 TABLET, FILM COATED ORAL
Qty: 0 | Refills: 0 | COMMUNITY
Start: 2018-02-14

## 2018-02-14 RX ORDER — FERROUS SULFATE 325(65) MG
1 TABLET ORAL
Qty: 90 | Refills: 0
Start: 2018-02-14 | End: 2018-03-15

## 2018-02-14 RX ORDER — HALOPERIDOL DECANOATE 100 MG/ML
0.5 INJECTION INTRAMUSCULAR ONCE
Qty: 0 | Refills: 0 | Status: COMPLETED | OUTPATIENT
Start: 2018-02-14 | End: 2018-02-14

## 2018-02-14 RX ADMIN — Medication 1 GRAM(S): at 05:26

## 2018-02-14 RX ADMIN — SPIRONOLACTONE 25 MILLIGRAM(S): 25 TABLET, FILM COATED ORAL at 05:26

## 2018-02-14 RX ADMIN — Medication 325 MILLIGRAM(S): at 11:09

## 2018-02-14 RX ADMIN — Medication 1 GRAM(S): at 11:09

## 2018-02-14 RX ADMIN — Medication 20 MILLIGRAM(S): at 05:26

## 2018-02-14 RX ADMIN — BICALUTAMIDE 50 MILLIGRAM(S): 50 TABLET, FILM COATED ORAL at 11:09

## 2018-02-14 RX ADMIN — PANTOPRAZOLE SODIUM 40 MILLIGRAM(S): 20 TABLET, DELAYED RELEASE ORAL at 05:26

## 2018-02-14 NOTE — PROGRESS NOTE ADULT - SUBJECTIVE AND OBJECTIVE BOX
Patient is a 89y old  Male who presents with a chief complaint of Syncope (2018 17:21)      HPI:  89 years old male who comes to ER for syncope episode. Anemic, positive  stool occult blood test (2018 17:21)      INTERVAL HPI/OVERNIGHT EVENTS:  The patient denies melena, hematochezia, hematemesis, nausea, vomiting, abdominal pain, constipation, diarrhea, or change in bowel movements     MEDICATIONS  (STANDING):  atorvastatin 40 milliGRAM(s) Oral at bedtime  bicalutamide 50 milliGRAM(s) Oral daily  dextrose 5% + sodium chloride 0.45%. 1000 milliLiter(s) (50 mL/Hr) IV Continuous <Continuous>  ferrous    sulfate 325 milliGRAM(s) Oral daily  furosemide    Tablet 20 milliGRAM(s) Oral daily  pantoprazole  Injectable 40 milliGRAM(s) IV Push every 12 hours  spironolactone 25 milliGRAM(s) Oral daily  sucralfate suspension 1 Gram(s) Oral four times a day    MEDICATIONS  (PRN):      FAMILY HISTORY:      Allergies    No Known Allergies    Intolerances        PMH/PSH:  HTN (hypertension)  History of tonsillectomy        REVIEW OF SYSTEMS:  CONSTITUTIONAL: No fever, weight loss, or fatigue  EYES: No eye pain, visual disturbances, or discharge  ENMT:  No difficulty hearing, tinnitus, vertigo; No sinus or throat pain  NECK: No pain or stiffness  BREASTS: No pain, masses, or nipple discharge  RESPIRATORY: No cough, wheezing, chills or hemoptysis; No shortness of breath  CARDIOVASCULAR: No chest pain, palpitations, dizziness, or leg swelling  GASTROINTESTINAL: See above  GENITOURINARY: No dysuria, frequency, hematuria, or incontinence  NEUROLOGICAL: No headaches, memory loss, loss of strength, numbness, or tremors  SKIN: No itching, burning, rashes, or lesions   LYMPH NODES: No enlarged glands  ENDOCRINE: No heat or cold intolerance; No hair loss  MUSCULOSKELETAL: No joint pain or swelling; No muscle, back, or extremity pain  PSYCHIATRIC: No depression, anxiety, mood swings, or difficulty sleeping  HEME/LYMPH: No easy bruising, or bleeding gums  ALLERGY AND IMMUNOLOGIC: No hives or eczema    Vital Signs Last 24 Hrs  T(C): 36.3 (2018 05:44), Max: 36.3 (2018 00:27)  T(F): 97.4 (2018 05:44), Max: 97.4 (2018 05:44)  HR: 57 (2018 05:44) (57 - 66)  BP: 117/68 (2018 05:44) (103/57 - 117/68)  BP(mean): --  RR: 16 (2018 05:44) (16 - 16)  SpO2: 99% (2018 05:44) (98% - 99%)    PHYSICAL EXAM:  GENERAL: NAD, well-groomed, well-developed  HEAD:  Atraumatic, Normocephalic  EYES: EOMI, PERRLA, conjunctiva and sclera clear  NECK: Supple, No JVD, Normal thyroid  NERVOUS SYSTEM:  Alert & Oriented X3, Fair concentration;   CHEST/LUNG: Clear to percussion bilaterally; No rales, rhonchi, wheezing, or rubs  HEART: Regular rate and rhythm; No murmurs, rubs, or gallops  ABDOMEN: Soft, Nontender, Nondistended; Bowel sounds present  EXTREMITIES:  2+ Peripheral Pulses, No clubbing, cyanosis, or edema  LYMPH: No lymphadenopathy noted  SKIN: No rashes or lesions    LAB                          11.3   4.79  )-----------( 127      ( 2018 07:22 )             34.9       CBC:   @ 07:22  WBC 4.79   Hgb 11.3   Hct 34.9   Plts 127  MCV 88.1   @ 14:13  WBC 6.07   Hgb 12.9   Hct 40.9   Plts 136  MCV 87.4      Chemistry:   @ 07:22  Na+ 144  K+ 3.5  Cl- 106  CO2 27  BUN 36  Cr 1.70      @ 14:13  Na+ 143  K+ 3.7  Cl- 104  CO2 31  BUN 36  Cr 1.77         Glucose, Serum: 89 mg/dL ( @ 07:22)  Glucose, Serum: 88 mg/dL ( @ 14:13)      2018 07:22    144    |  106    |  36     ----------------------------<  89     3.5     |  27     |  1.70   2018 14:13    143    |  104    |  36     ----------------------------<  88     3.7     |  31     |  1.77     Ca    8.4        2018 07:22  Ca    9.5        2018 14:13    TPro  8.3    /  Alb  3.8    /  TBili  0.6    /  DBili  x      /  AST  35     /  ALT  41     /  AlkPhos  66     2018 14:13      PT/INR - ( 2018 14:13 )   PT: 11.9 sec;   INR: 1.09 ratio         PTT - ( 2018 14:13 )  PTT:33.9 sec    Urinalysis Basic - ( 2018 14:22 )    Color: Yellow / Appearance: Clear / S.010 / pH: x  Gluc: x / Ketone: Negative  / Bili: Negative / Urobili: Negative mg/dL   Blood: x / Protein: Negative mg/dL / Nitrite: Negative   Leuk Esterase: Negative / RBC: 25-50 /HPF / WBC x   Sq Epi: x / Non Sq Epi: x / Bacteria: x        CAPILLARY BLOOD GLUCOSE              RADIOLOGY & ADDITIONAL TESTS:    Imaging Personally Reviewed:  [ ] YES  [ ] NO    Consultant(s) Notes Reviewed:  [ ] YES  [ ] NO    Care Discussed with Consultants/Other Providers [ ] YES  [ ] NO
INTERVAL History:  Anemia  Allergies    No Known Allergies    Intolerances        MEDICATIONS  (STANDING):  atorvastatin 40 milliGRAM(s) Oral at bedtime  bicalutamide 50 milliGRAM(s) Oral daily  ferrous    sulfate 325 milliGRAM(s) Oral daily  furosemide    Tablet 20 milliGRAM(s) Oral daily  pantoprazole  Injectable 40 milliGRAM(s) IV Push every 12 hours  spironolactone 25 milliGRAM(s) Oral daily  sucralfate suspension 1 Gram(s) Oral four times a day    MEDICATIONS  (PRN):      Vital Signs Last 24 Hrs  T(C): 36.8 (14 Feb 2018 05:15), Max: 37.1 (14 Feb 2018 00:09)  T(F): 98.2 (14 Feb 2018 05:15), Max: 98.7 (14 Feb 2018 00:09)  HR: 67 (14 Feb 2018 05:15) (63 - 67)  BP: 107/64 (14 Feb 2018 05:15) (104/62 - 112/56)  BP(mean): --  RR: 18 (14 Feb 2018 05:15) (17 - 18)  SpO2: 98% (14 Feb 2018 05:15) (98% - 99%)    PHYSICAL EXAM:      EYES: EOMI, PERRLA, conjunctiva and sclera clear  NECK: Supple, No JVD, Normal thyroid  CHEST/LUNG: Clear to percussion bilaterally; No rales, rhonchi,   HEART: Regular rate and rhythm;   ABDOMEN: Soft, Nontender.  Edema++        LABS:                  RADIOLOGY & ADDITIONAL STUDIES:    PATHOLOGY:
Patient is a 89y old  Male who presents with a chief complaint of Syncope (11 Feb 2018 17:21)      HPI:  89 years old male who comes to ER for syncope episode. Anemic, positive  stool occult blood test (11 Feb 2018 17:21)      INTERVAL HPI/OVERNIGHT EVENTS:  The patient denies melena, hematochezia, hematemesis, nausea, vomiting, abdominal pain, constipation, diarrhea, or change in bowel movements     MEDICATIONS  (STANDING):  atorvastatin 40 milliGRAM(s) Oral at bedtime  bicalutamide 50 milliGRAM(s) Oral daily  ferrous    sulfate 325 milliGRAM(s) Oral daily  furosemide    Tablet 20 milliGRAM(s) Oral daily  pantoprazole  Injectable 40 milliGRAM(s) IV Push every 12 hours  spironolactone 25 milliGRAM(s) Oral daily  sucralfate suspension 1 Gram(s) Oral four times a day    MEDICATIONS  (PRN):      FAMILY HISTORY:      Allergies    No Known Allergies    Intolerances        PMH/PSH:  HTN (hypertension)  History of tonsillectomy        REVIEW OF SYSTEMS:  CONSTITUTIONAL: No fever, weight loss, or fatigue  EYES: No eye pain, visual disturbances, or discharge  ENMT:  No difficulty hearing, tinnitus, vertigo; No sinus or throat pain  NECK: No pain or stiffness  BREASTS: No pain, masses, or nipple discharge  RESPIRATORY: No cough, wheezing, chills or hemoptysis; No shortness of breath  CARDIOVASCULAR: No chest pain, palpitations, dizziness, or leg swelling  GASTROINTESTINAL: See above  GENITOURINARY: No dysuria, frequency, hematuria, or incontinence  NEUROLOGICAL: No headaches, memory loss, loss of strength, numbness, or tremors  SKIN: No itching, burning, rashes, or lesions       Vital Signs Last 24 Hrs  T(C): 36.8 (14 Feb 2018 05:15), Max: 37.1 (14 Feb 2018 00:09)  T(F): 98.2 (14 Feb 2018 05:15), Max: 98.7 (14 Feb 2018 00:09)  HR: 61 (14 Feb 2018 07:00) (61 - 67)  BP: 107/64 (14 Feb 2018 05:15) (104/62 - 112/56)  BP(mean): --  RR: 18 (14 Feb 2018 05:15) (17 - 18)  SpO2: 98% (14 Feb 2018 05:15) (98% - 99%)    PHYSICAL EXAM:  GENERAL: NAD, well-groomed, well-developed  HEAD:  Atraumatic, Normocephalic  EYES: EOMI, PERRLA, conjunctiva and sclera clear  NECK: Supple, No JVD, Normal thyroid  NERVOUS SYSTEM:  Alert & Oriented X3, Good concentration;  CHEST/LUNG: Clear to percussion bilaterally; No rales, rhonchi, wheezing, or rubs  HEART: Regular rate and rhythm; No murmurs, rubs, or gallops  ABDOMEN: Soft, Nontender, Nondistended; Bowel sounds present  EXTREMITIES:  2+ Peripheral Pulses, No clubbing, cyanosis, or edema  LYMPH: No lymphadenopathy noted  SKIN: No rashes or lesions    LAB        CBC:  02-12 @ 07:22  WBC 4.79   Hgb 11.3   Hct 34.9   Plts 127  MCV 88.1  02-11 @ 14:13  WBC 6.07   Hgb 12.9   Hct 40.9   Plts 136  MCV 87.4      Chemistry:  02-12 @ 07:22  Na+ 144  K+ 3.5  Cl- 106  CO2 27  BUN 36  Cr 1.70     02-11 @ 14:13  Na+ 143  K+ 3.7  Cl- 104  CO2 31  BUN 36  Cr 1.77         Glucose, Serum: 89 mg/dL (02-12 @ 07:22)  Glucose, Serum: 88 mg/dL (02-11 @ 14:13)      12 Feb 2018 07:22    144    |  106    |  36     ----------------------------<  89     3.5     |  27     |  1.70   11 Feb 2018 14:13    143    |  104    |  36     ----------------------------<  88     3.7     |  31     |  1.77     Ca    8.4        12 Feb 2018 07:22  Ca    9.5        11 Feb 2018 14:13    TPro  8.3    /  Alb  3.8    /  TBili  0.6    /  DBili  x      /  AST  35     /  ALT  41     /  AlkPhos  66     11 Feb 2018 14:13              CAPILLARY BLOOD GLUCOSE              RADIOLOGY & ADDITIONAL TESTS:    Imaging Personally Reviewed:  [ ] YES  [ ] NO    Consultant(s) Notes Reviewed:  [ ] YES  [ ] NO    Care Discussed with Consultants/Other Providers [ ] YES  [ ] NO
Patient is a 89y old  Male who presents with a chief complaint of Syncope (11 Feb 2018 17:21)    MEDICAL PROBLEMS:  GI BLEED  FALL  HTN (hypertension)  GI bleed  GI bleed  Gastrointestinal hemorrhage, unspecified gastrointestinal hemorrhage type      INTERVAL HPI/OVERNIGHT EVENTS: Alert, no focal weakness. MRI of brain revealed chronic ischemic changes    MEDICATIONS  (STANDING):  atorvastatin 40 milliGRAM(s) Oral at bedtime  bicalutamide 50 milliGRAM(s) Oral daily  ferrous    sulfate 325 milliGRAM(s) Oral daily  furosemide    Tablet 20 milliGRAM(s) Oral daily  pantoprazole  Injectable 40 milliGRAM(s) IV Push every 12 hours  spironolactone 25 milliGRAM(s) Oral daily  sucralfate suspension 1 Gram(s) Oral four times a day    MEDICATIONS  (PRN):    Allergies    No Known Allergies    Intolerances      Vital Signs Last 24 Hrs  T(C): 36 (13 Feb 2018 17:01), Max: 36.3 (13 Feb 2018 00:27)  T(F): 96.8 (13 Feb 2018 17:01), Max: 97.4 (13 Feb 2018 05:44)  HR: 63 (13 Feb 2018 17:01) (52 - 63)  BP: 112/56 (13 Feb 2018 17:01) (107/58 - 117/68)  BP(mean): --  RR: 17 (13 Feb 2018 17:01) (16 - 17)  SpO2: 99% (13 Feb 2018 17:01) (98% - 99%)    PHYSICAL EXAM:  GENERAL: NAD, well-groomed, well-developed  HEAD:  Atraumatic, Normocephalic  EYES: EOMI, PERRLA, conjunctiva and sclera clear  ENMT: No tonsillar erythema, exudates, or enlargement; Moist mucous membranes, Good dentition, No lesions  NECK: Supple, No JVD, Normal thyroid  NERVOUS SYSTEM:  Alert & Oriented X3, Good concentration; Motor Strength 5/5 B/L upper and lower extremities; DTRs 2+ intact and symmetric  CHEST/LUNG: Clear to percussion bilaterally; No rales, rhonchi, wheezing, or rubs  HEART: Regular rate and rhythm; No murmurs, rubs, or gallops  ABDOMEN: Soft, Nontender, Nondistended; Bowel sounds present  EXTREMITIES:  2+ Peripheral Pulses, No clubbing, cyanosis, or edema  LYMPH: No lymphadenopathy noted  SKIN: No rashes or lesions    02-12 @ 07:01  -  02-13 @ 07:00  --------------------------------------------------------  IN: 0 mL / OUT: 200 mL / NET: -200 mL    02-13 @ 07:01  -  02-13 @ 17:47  --------------------------------------------------------  IN: 120 mL / OUT: 0 mL / NET: 120 mL        LABS:                        11.3   4.79  )-----------( 127      ( 12 Feb 2018 07:22 )             34.9     02-12    144  |  106  |  36<H>  ----------------------------<  89  3.5   |  27  |  1.70<H>    Ca    8.4<L>      12 Feb 2018 07:22          CAPILLARY BLOOD GLUCOSE        Thyroid Stimulating Hormone, Serum: 1.090 uIU/mL (02-13 @ 06:22)  Thyroid Stimulating Hormone, Serum: 0.377 uIU/mL (02-12 @ 07:22)  Troponin I, Serum: .024 ng/mL (02-11 @ 14:13)  CPK Mass Assay %: 0.6 % (02-11 @ 14:13)    Cultures:    Culture - Urine (collected 11 Feb 2018 18:27)  Source: .Urine Clean Catch (Midstream)  Final Report (12 Feb 2018 21:16):    <10,000 CFU/ml Normal Urogenital pepe present              RADIOLOGY & ADDITIONAL TESTS:    Imaging Personally Reviewed:  [X] YES  [ ] NO    Consultant(s) Notes Reviewed:  [X] YES  [ ] NO    Care Discussed with Consultants/Other Providers [X] YES  [ ] NO
Patient is a 89y old  Male who presents with a chief complaint of Syncope (2018 17:21)    MEDICAL PROBLEMS:  GI BLEED  FALL  HTN (hypertension)  GI bleed      INTERVAL HPI/OVERNIGHT EVENTS: alert, no CP, no SOB. Positive stool occult blood test with anemia    MEDICATIONS  (STANDING):  atorvastatin 40 milliGRAM(s) Oral at bedtime  bicalutamide 50 milliGRAM(s) Oral daily  dextrose 5% + sodium chloride 0.45%. 1000 milliLiter(s) (50 mL/Hr) IV Continuous <Continuous>  ferrous    sulfate 325 milliGRAM(s) Oral daily  furosemide    Tablet 20 milliGRAM(s) Oral daily  pantoprazole  Injectable 40 milliGRAM(s) IV Push every 12 hours  spironolactone 25 milliGRAM(s) Oral daily  sucralfate suspension 1 Gram(s) Oral four times a day    MEDICATIONS  (PRN):    Allergies    No Known Allergies    Intolerances      Vital Signs Last 24 Hrs  T(C): 36.7 (2018 05:32), Max: 36.9 (2018 15:38)  T(F): 98 (2018 05:32), Max: 98.5 (2018 15:38)  HR: 66 (2018 09:06) (66 - 83)  BP: 116/68 (2018 09:06) (102/62 - 136/77)  BP(mean): --  RR: 16 (2018 09:06) (16 - 18)  SpO2: 98% (2018 09:06) (95% - 99%)    PHYSICAL EXAM:  GENERAL: NAD, well-groomed, well-developed  HEAD:  Atraumatic, Normocephalic  EYES: EOMI, PERRLA, conjunctiva and sclera clear  ENMT: No tonsillar erythema, exudates, or enlargement; Moist mucous membranes, Good dentition, No lesions  NECK: Supple, No JVD, Normal thyroid  NERVOUS SYSTEM:  Alert & Oriented X3, Good concentration; Motor Strength 5/5 B/L upper and lower extremities; DTRs 2+ intact and symmetric  CHEST/LUNG: Clear to percussion bilaterally; No rales, rhonchi, wheezing, or rubs  HEART: Regular rate and rhythm; No murmurs, rubs, or gallops  ABDOMEN: Soft, Nontender, Nondistended; Bowel sounds present  EXTREMITIES:  2+ Peripheral Pulses, No clubbing, cyanosis, or edema  LYMPH: No lymphadenopathy noted  SKIN: No rashes or lesions     @ 07:01  -   @ 07:00  --------------------------------------------------------  IN: 375 mL / OUT: 0 mL / NET: 375 mL        LABS:                        11.3   4.79  )-----------( 127      ( 2018 07:22 )             34.9         144  |  106  |  36<H>  ----------------------------<  89  3.5   |  27  |  1.70<H>    Ca    8.4<L>      2018 07:22    TPro  8.3  /  Alb  3.8  /  TBili  0.6  /  DBili  x   /  AST  35  /  ALT  41  /  AlkPhos  66  11    PT/INR - ( 2018 14:13 )   PT: 11.9 sec;   INR: 1.09 ratio         PTT - ( 2018 14:13 )  PTT:33.9 sec  Urinalysis Basic - ( 2018 14:22 )    Color: Yellow / Appearance: Clear / S.010 / pH: x  Gluc: x / Ketone: Negative  / Bili: Negative / Urobili: Negative mg/dL   Blood: x / Protein: Negative mg/dL / Nitrite: Negative   Leuk Esterase: Negative / RBC: 25-50 /HPF / WBC x   Sq Epi: x / Non Sq Epi: x / Bacteria: x      CAPILLARY BLOOD GLUCOSE        Thyroid Stimulating Hormone, Serum: 0.377 uIU/mL ( @ 07:22)  Troponin I, Serum: .024 ng/mL ( @ 14:13)  CPK Mass Assay %: 0.6 % ( @ 14:13)    Cultures:            RADIOLOGY & ADDITIONAL TESTS:    Imaging Personally Reviewed:  [X] YES  [ ] NO    Consultant(s) Notes Reviewed:  [X] YES  [ ] NO    Care Discussed with Consultants/Other Providers [X] YES  [ ] NO

## 2018-02-14 NOTE — CHART NOTE - NSCHARTNOTEFT_GEN_A_CORE
Medicine Hospitalist PA    Called by RN to evaluate pt for confusion/agitations. Patient seen and examined at bedside with house NP.  Patient is a 89 y/o male admitted syncope, +stool occult, anemia, now confused per RN (baseline A+Ox3, able to recall RN/NA name), unable to recall RN/NA names. He has been trying to Patient states he is at home, year 2080, however able to recall today is February 14th. Patient is inappropriately laughing spontaneously. Denies lightheadedness/dizziness, headaches, blurry vision, chest pain, palpitations, SOB, abdominal pain, urinary/bowel changes. However, ROS not reliable since patient is confused at the present moment.    Vital Signs Last 24 Hrs  T(C): 35.6 (14 Feb 2018 11:31), Max: 37.1 (14 Feb 2018 00:09)  T(F): 96 (14 Feb 2018 11:31), Max: 98.7 (14 Feb 2018 00:09)  HR: 80 (14 Feb 2018 13:57) (61 - 80)  BP: 116/83 (14 Feb 2018 13:57) (104/62 - 116/83)  RR: 18 (14 Feb 2018 11:31) (17 - 18)  SpO2: 97% (14 Feb 2018 11:31) (97% - 99%)    General: Alert, confused, NAD, PERRL, EOM intact.  Cardio: S1S2 regular rate/rhythm  Resp: Good air entry B/L. No rales, rhonchi, wheezes.  Abd: Soft NTND +BS  Ext: No lower extremity edema. 2+ pulses b/l  Neuro: 5/5 strength b/l upper and lower extremity. Good handgrip. No arm or leg drift. NIH 1.    A&P  89 y/o male admitted syncope, +stool occult, anemia, now confused  -CT head w/o contrast STAT  -CBC, CMP, T&S STAT  -fall risk protocol  -Finger glucose 106  -will follow up results  -Dr. Lay and Dr. Cast made aware. Medicine Hospitalist PA    Called by RN to evaluate pt for confusion/agitations. Patient seen and examined at bedside with house NP.  Patient is a 89 y/o male admitted syncope, +stool occult, anemia, now confused per RN (baseline A+Ox3, able to recall RN/NA name), unable to recall RN/NA names. He has been trying to Patient states he is at home, year 2080, however able to recall today is February 14th. Patient is inappropriately laughing spontaneously. Denies lightheadedness/dizziness, headaches, blurry vision, chest pain, palpitations, SOB, abdominal pain, urinary/bowel changes. However, ROS not reliable since patient is confused at the present moment.    Vital Signs Last 24 Hrs  T(C): 35.6 (14 Feb 2018 11:31), Max: 37.1 (14 Feb 2018 00:09)  T(F): 96 (14 Feb 2018 11:31), Max: 98.7 (14 Feb 2018 00:09)  HR: 80 (14 Feb 2018 13:57) (61 - 80)  BP: 116/83 (14 Feb 2018 13:57) (104/62 - 116/83)  RR: 18 (14 Feb 2018 11:31) (17 - 18)  SpO2: 97% (14 Feb 2018 11:31) (97% - 99%)    General: Alert, confused, NAD, PERRL, EOM intact.  Cardio: S1S2 regular rate/rhythm  Resp: Good air entry B/L. No rales, rhonchi, wheezes.  Abd: Soft NTND +BS  Ext: No lower extremity edema. 2+ pulses b/l  Neuro: 5/5 strength b/l upper and lower extremity. Good handgrip. No arm or leg drift. NIH 1.    A&P  89 y/o male admitted syncope, +stool occult, anemia, now confused  -CT head w/o contrast STAT  -CBC, CMP, T&S STAT  -fall risk protocol  -Finger glucose 106  -will follow up results  -Dr. Lay and Dr. Cast made aware.    Addendum 16:38  -ct negative for acute changes, pending bloodwork  CT Head No Cont (02.14.18 @ 15:09)  HEMISPHERES:  Chronic ischemic changes are again scattered within the   white matter and basal ganglia both hemispheres with volume loss. No   acute infarct or hemorrhage is seen. There is no gross intraluminal   thrombus.  VENTRICLES:  Ex vacuo enlargement is noted  POSTERIOR FOSSA:  Tortuosity of the vertebrobasilar system is again noted   with no acute abnormality.  EXTRACEREBRAL SPACES:  No subdural or epidural collections are noted.  SKULL BASE AND CALVARIUM:  Appears intact.  No fracture or destructive   lesion is identified.  SINUSES AND MASTOIDS:  Clear.  MISCELLANEOUS:  Atherosclerotic changes are noted of the intracerebral  vasculature.   IMPRESSION:    1)  chronic ischemic changes again noted in both hemispheres with volume   loss.  2)  no acute abnormality suggested. Follow-up MR imaging may be   considered for further assessment. Medicine Hospitalist PA    Called by RN to evaluate pt for confusion/agitations. Patient seen and examined at bedside with house NP.  Patient is a 89 y/o male admitted syncope, +stool occult, anemia, now confused per RN (baseline A+Ox3, able to recall RN/NA name), unable to recall RN/NA names. He has been trying to Patient states he is at home, year 2080, however able to recall today is February 14th. Patient is inappropriately laughing spontaneously. Denies lightheadedness/dizziness, headaches, blurry vision, chest pain, palpitations, SOB, abdominal pain, urinary/bowel changes. However, ROS not reliable since patient is confused at the present moment.    Vital Signs Last 24 Hrs  T(C): 35.6 (14 Feb 2018 11:31), Max: 37.1 (14 Feb 2018 00:09)  T(F): 96 (14 Feb 2018 11:31), Max: 98.7 (14 Feb 2018 00:09)  HR: 80 (14 Feb 2018 13:57) (61 - 80)  BP: 116/83 (14 Feb 2018 13:57) (104/62 - 116/83)  RR: 18 (14 Feb 2018 11:31) (17 - 18)  SpO2: 97% (14 Feb 2018 11:31) (97% - 99%)    General: Alert, confused, NAD, PERRL, EOM intact.  Cardio: S1S2 regular rate/rhythm  Resp: Good air entry B/L. No rales, rhonchi, wheezes.  Abd: Soft NTND +BS  Ext: No lower extremity edema. 2+ pulses b/l  Neuro: 5/5 strength b/l upper and lower extremity. Good handgrip. No arm or leg drift. NIH 1.    A&P  89 y/o male admitted syncope, +stool occult, anemia, now confused  -CT head w/o contrast STAT  -CBC, CMP, T&S STAT  -fall risk protocol  -Finger glucose 106  -will follow up results  -Dr. Lay and Dr. Cast made aware.    Addendum 16:38  -ct negative for acute changes, pending bloodwork  -patient is now able to recall year and the hospital  CT Head No Cont (02.14.18 @ 15:09)  HEMISPHERES:  Chronic ischemic changes are again scattered within the   white matter and basal ganglia both hemispheres with volume loss. No   acute infarct or hemorrhage is seen. There is no gross intraluminal   thrombus.  VENTRICLES:  Ex vacuo enlargement is noted  POSTERIOR FOSSA:  Tortuosity of the vertebrobasilar system is again noted   with no acute abnormality.  EXTRACEREBRAL SPACES:  No subdural or epidural collections are noted.  SKULL BASE AND CALVARIUM:  Appears intact.  No fracture or destructive   lesion is identified.  SINUSES AND MASTOIDS:  Clear.  MISCELLANEOUS:  Atherosclerotic changes are noted of the intracerebral  vasculature.   IMPRESSION:    1)  chronic ischemic changes again noted in both hemispheres with volume   loss.  2)  no acute abnormality suggested. Follow-up MR imaging may be   considered for further assessment.

## 2018-02-14 NOTE — PROGRESS NOTE ADULT - ASSESSMENT
HPI:  89 years old male who comes to ER for syncope episode. Anemic, positive  stool occult blood test (11 Feb 2018 17:21)  ------------------------------------------------------------------------------------------------------------------------------------------  The patient states that on the day of discharge, he felt dizzy, and slipped to the floor. Work up revealed a slight anemia and hem (+) stool.  The patient denies melena, hematochezia, hematemesis, nausea, vomiting, abdominal pain, constipation, diarrhea, or change in bowel movements Weight loss (+). He states that he had a gastric ulcer approximately 5 years. Medication list includes a PPI and Carafate but the patient is unsure what he is taking. He is unsure if he ever had a colonoscopy.  --------Anemia, Heme (+), low iron saturation, normal ferritin - Discussed importance of GI W/U with patient. After a thorough explanation, the patient does not want any GI work up. Left message to spouse to call me.
Subjective Complaints:  Historian:         REVIEW OF SYSTEMS:  Eyes:  Good vision, no reported pain  ENT:  No sore throat, pain, runny nose, dysphagia  CV:  No pain, palpitatioins, hypo/hypertension  Resp:  No dyspnea, cough, tachypnea, wheezing  GI:  No pain, nausea, vomiting, diarrhea, constipatiion  Muscle:  No pain, weakness  Neuro:  No weakness, tingling, memory problems  Psych:  No fatigue, insomnia, mood problems, depression  Endocrine:  No polyuria, polydypsia, cold/heat intolerance    Vital Signs Last 24 Hrs  T(C): 36 (13 Feb 2018 17:01), Max: 36.3 (13 Feb 2018 00:27)  T(F): 96.8 (13 Feb 2018 17:01), Max: 97.4 (13 Feb 2018 05:44)  HR: 63 (13 Feb 2018 17:01) (52 - 63)  BP: 112/56 (13 Feb 2018 17:01) (107/58 - 117/68)  BP(mean): --  RR: 17 (13 Feb 2018 17:01) (16 - 17)  SpO2: 99% (13 Feb 2018 17:01) (98% - 99%)    GENERAL PHYSICAL EXAM:  General:  Appears stated age, well-groomed, well-nourished, no distress  HEENT:  NC/AT, patent nares w/ pink mucosa, OP clear w/o lesions, PERRL, EOMI, conjunctivae clear, no thyromegaly, nodules, adenopathy, no JVD  Chest:  Full & symmetric excursion, no increased effort, breath sounds clear  Cardiovascular:  Regular rhythm, S1, S2, no murmur/rub/S3/S4, no carotid/femoral/abdominal bruit, radial/pedal pulses 2+, no edema  Abdomen:  Soft, non-tender, non-distended, normoactive bowel sounds, no HSM  Extremities:  Gait & station:   Digits:   Nails:   Joints, Bones, Muscles:   ROM:   Stability:  Skin:  No rash/erythema/ecchymoses/petechiae/wounds/abscess/warm/dry  Musculoskeletal:  Full ROM in all joints w/o swelling/tenderness/effusion    NEUROLOGICAL EXAM:  HENT:  Normocephalic head; atraumatic head.  Neck supple.  ENT: normal looking.  Mental State:    Alert     open eyes arm leg 3/5  no s eziure s/p syncope  dementia   poor historian                       11.3   4.79  )-----------( 127      ( 12 Feb 2018 07:22 )             34.9     02-12    144  |  106  |  36<H>  ----------------------------<  89  3.5   |  27  |  1.70<H>    Ca    8.4<L>      12 Feb 2018 07:22     ass= awake alert speech fluent  arm leg 3/5 legs swollen s/p syncope no seziure   ct  head no acute apth  gi bleed  stable        RADIOLOGY & ADDITIONAL STUDIES:        Recommendations: for sub acute rehab  will follow
For MRI of brain, 2D echo, carotid A Doppler. Anemia and GI bleeding evaluation
HPI:  89 years old male who comes to ER for syncope episode. Anemic, positive  stool occult blood test (11 Feb 2018 17:21)  ------------------------------------------------------------------------------------------------------------------------------------------  The patient states that on the day of discharge, he felt dizzy, and slipped to the floor. Work up revealed a slight anemia and hem (+) stool.  The patient denies melena, hematochezia, hematemesis, nausea, vomiting, abdominal pain, constipation, diarrhea, or change in bowel movements Weight loss (+). He states that he had a gastric ulcer approximately 5 years. Medication list includes a PPI and Carafate but the patient is unsure what he is taking. He is unsure if he ever had a colonoscopy.  --------Anemia, Heme (+), low iron saturation, normal ferritin - Discussed importance of GI W/U with patient. After a thorough explanation, the patient does not want any GI work up. Left message to spouse to call me.
Plan: D/C IV. Stable to go to rehab. tomorrow

## 2018-02-14 NOTE — DISCHARGE NOTE ADULT - MEDICATION SUMMARY - MEDICATIONS TO TAKE
I will START or STAY ON the medications listed below when I get home from the hospital:    spironolactone 25 mg oral tablet  -- 1 tab(s) by mouth once a day  -- Indication: For CHF    rosuvastatin 10 mg oral tablet  -- 1 tab(s) by mouth once a day (at bedtime)  -- Indication: For HLD    bicalutamide 50 mg oral tablet  -- 1 tab(s) by mouth once a day  -- Indication: For Prostate cancer    furosemide 20 mg oral tablet  -- 1 tab(s) by mouth once a day  -- Indication: For CHF    ferrous sulfate 325 mg (65 mg elemental iron) oral tablet  -- 1 tab(s) by mouth 3 times a day  -- Indication: For Anemia    sucralfate 1 g/10 mL oral suspension  -- 10 milliliter(s) by mouth 4 times a day  -- Indication: For PUD    pantoprazole 40 mg intravenous injection  -- 40 milligram(s) intravenous every 12 hours  -- Indication: For PUD

## 2018-02-14 NOTE — DISCHARGE NOTE ADULT - CARE PROVIDER_API CALL
Laz Lay), Internal Medicine  [address] [place] [address]  Phone: [phone]  Fax: [phone]

## 2018-02-14 NOTE — PROGRESS NOTE ADULT - PROBLEM SELECTOR PROBLEM 1
GI bleed
Gastrointestinal hemorrhage, unspecified gastrointestinal hemorrhage type
Gastrointestinal hemorrhage, unspecified gastrointestinal hemorrhage type

## 2018-02-14 NOTE — DISCHARGE NOTE ADULT - PATIENT PORTAL LINK FT
You can access the [email or web address] Patient Portal, offered by [facility], by registering with the following website: [email or web address]

## 2018-02-14 NOTE — DISCHARGE NOTE ADULT - CARE PLAN
Principal Discharge DX:	Syncope and collapse  Goal:	Due to vasovagal event. Resolved  Assessment and plan of treatment:	Observation  Secondary Diagnosis:	Gastrointestinal hemorrhage, unspecified gastrointestinal hemorrhage type  Goal:	Follow up as outpatient as pt's request  Secondary Diagnosis:	Essential hypertension  Goal:	Meds

## 2018-02-14 NOTE — DISCHARGE NOTE ADULT - HOSPITAL COURSE
89 years old male who is admitted for syncope due to vasovagal event, GI bleeding from PUD. No acute lesion on CT-head. normal H/H. GI workup as outpatient 89 years old male who is admitted for syncope due to vasovagal event, GI bleeding from PUD. No acute lesion on CT-head. Hb: 11.2. GI workup as outpatient

## 2018-02-16 DIAGNOSIS — N18.3 CHRONIC KIDNEY DISEASE, STAGE 3 (MODERATE): ICD-10-CM

## 2018-02-16 DIAGNOSIS — Z87.891 PERSONAL HISTORY OF NICOTINE DEPENDENCE: ICD-10-CM

## 2018-02-16 DIAGNOSIS — R19.5 OTHER FECAL ABNORMALITIES: ICD-10-CM

## 2018-02-16 DIAGNOSIS — D64.9 ANEMIA, UNSPECIFIED: ICD-10-CM

## 2018-02-16 DIAGNOSIS — Z85.46 PERSONAL HISTORY OF MALIGNANT NEOPLASM OF PROSTATE: ICD-10-CM

## 2018-02-16 DIAGNOSIS — R55 SYNCOPE AND COLLAPSE: ICD-10-CM

## 2018-02-16 DIAGNOSIS — I12.9 HYPERTENSIVE CHRONIC KIDNEY DISEASE WITH STAGE 1 THROUGH STAGE 4 CHRONIC KIDNEY DISEASE, OR UNSPECIFIED CHRONIC KIDNEY DISEASE: ICD-10-CM

## 2018-02-16 DIAGNOSIS — K92.2 GASTROINTESTINAL HEMORRHAGE, UNSPECIFIED: ICD-10-CM

## 2018-11-27 ENCOUNTER — INPATIENT (INPATIENT)
Facility: HOSPITAL | Age: 83
LOS: 1 days | Discharge: HOME HEALTH | End: 2018-11-29
Attending: HOSPITALIST | Admitting: HOSPITALIST
Payer: COMMERCIAL

## 2018-11-27 VITALS
OXYGEN SATURATION: 94 % | WEIGHT: 154.98 LBS | HEART RATE: 73 BPM | TEMPERATURE: 98 F | HEIGHT: 66 IN | RESPIRATION RATE: 16 BRPM | DIASTOLIC BLOOD PRESSURE: 91 MMHG | SYSTOLIC BLOOD PRESSURE: 151 MMHG

## 2018-11-27 DIAGNOSIS — C61 MALIGNANT NEOPLASM OF PROSTATE: ICD-10-CM

## 2018-11-27 DIAGNOSIS — I50.32 CHRONIC DIASTOLIC (CONGESTIVE) HEART FAILURE: ICD-10-CM

## 2018-11-27 DIAGNOSIS — R26.81 UNSTEADINESS ON FEET: ICD-10-CM

## 2018-11-27 DIAGNOSIS — I10 ESSENTIAL (PRIMARY) HYPERTENSION: ICD-10-CM

## 2018-11-27 DIAGNOSIS — I50.22 CHRONIC SYSTOLIC (CONGESTIVE) HEART FAILURE: ICD-10-CM

## 2018-11-27 DIAGNOSIS — Z98.89 OTHER SPECIFIED POSTPROCEDURAL STATES: Chronic | ICD-10-CM

## 2018-11-27 LAB
ALBUMIN SERPL ELPH-MCNC: 3.7 G/DL — SIGNIFICANT CHANGE UP (ref 3.3–5)
ALP SERPL-CCNC: 72 U/L — SIGNIFICANT CHANGE UP (ref 40–120)
ALT FLD-CCNC: 26 U/L — SIGNIFICANT CHANGE UP (ref 12–78)
ANION GAP SERPL CALC-SCNC: 11 MMOL/L — SIGNIFICANT CHANGE UP (ref 5–17)
APPEARANCE UR: CLEAR — SIGNIFICANT CHANGE UP
APTT BLD: 35.1 SEC — SIGNIFICANT CHANGE UP (ref 27.5–36.3)
AST SERPL-CCNC: 31 U/L — SIGNIFICANT CHANGE UP (ref 15–37)
BACTERIA # UR AUTO: ABNORMAL
BASOPHILS # BLD AUTO: 0.02 K/UL — SIGNIFICANT CHANGE UP (ref 0–0.2)
BASOPHILS NFR BLD AUTO: 0.3 % — SIGNIFICANT CHANGE UP (ref 0–2)
BILIRUB SERPL-MCNC: 0.6 MG/DL — SIGNIFICANT CHANGE UP (ref 0.2–1.2)
BILIRUB UR-MCNC: NEGATIVE — SIGNIFICANT CHANGE UP
BUN SERPL-MCNC: 30 MG/DL — HIGH (ref 7–23)
CALCIUM SERPL-MCNC: 9.3 MG/DL — SIGNIFICANT CHANGE UP (ref 8.5–10.1)
CHLORIDE SERPL-SCNC: 106 MMOL/L — SIGNIFICANT CHANGE UP (ref 96–108)
CK MB BLD-MCNC: 0.7 % — SIGNIFICANT CHANGE UP (ref 0–3.5)
CK MB CFR SERPL CALC: 3.3 NG/ML — SIGNIFICANT CHANGE UP (ref 0.5–3.6)
CK SERPL-CCNC: 496 U/L — HIGH (ref 26–308)
CO2 SERPL-SCNC: 23 MMOL/L — SIGNIFICANT CHANGE UP (ref 22–31)
COLOR SPEC: YELLOW — SIGNIFICANT CHANGE UP
CREAT SERPL-MCNC: 1.79 MG/DL — HIGH (ref 0.5–1.3)
DIFF PNL FLD: NEGATIVE — SIGNIFICANT CHANGE UP
EOSINOPHIL # BLD AUTO: 0.14 K/UL — SIGNIFICANT CHANGE UP (ref 0–0.5)
EOSINOPHIL NFR BLD AUTO: 2.1 % — SIGNIFICANT CHANGE UP (ref 0–6)
EPI CELLS # UR: SIGNIFICANT CHANGE UP
GLUCOSE SERPL-MCNC: 77 MG/DL — SIGNIFICANT CHANGE UP (ref 70–99)
GLUCOSE UR QL: NEGATIVE MG/DL — SIGNIFICANT CHANGE UP
HCT VFR BLD CALC: 42.4 % — SIGNIFICANT CHANGE UP (ref 39–50)
HGB BLD-MCNC: 13.4 G/DL — SIGNIFICANT CHANGE UP (ref 13–17)
IMM GRANULOCYTES NFR BLD AUTO: 0.2 % — SIGNIFICANT CHANGE UP (ref 0–1.5)
INR BLD: 1.03 RATIO — SIGNIFICANT CHANGE UP (ref 0.88–1.16)
KETONES UR-MCNC: NEGATIVE — SIGNIFICANT CHANGE UP
LEUKOCYTE ESTERASE UR-ACNC: NEGATIVE — SIGNIFICANT CHANGE UP
LYMPHOCYTES # BLD AUTO: 1.44 K/UL — SIGNIFICANT CHANGE UP (ref 1–3.3)
LYMPHOCYTES # BLD AUTO: 21.9 % — SIGNIFICANT CHANGE UP (ref 13–44)
MAGNESIUM SERPL-MCNC: 2.5 MG/DL — SIGNIFICANT CHANGE UP (ref 1.6–2.6)
MCHC RBC-ENTMCNC: 28.2 PG — SIGNIFICANT CHANGE UP (ref 27–34)
MCHC RBC-ENTMCNC: 31.6 GM/DL — LOW (ref 32–36)
MCV RBC AUTO: 89.3 FL — SIGNIFICANT CHANGE UP (ref 80–100)
MONOCYTES # BLD AUTO: 0.67 K/UL — SIGNIFICANT CHANGE UP (ref 0–0.9)
MONOCYTES NFR BLD AUTO: 10.2 % — SIGNIFICANT CHANGE UP (ref 2–14)
NEUTROPHILS # BLD AUTO: 4.3 K/UL — SIGNIFICANT CHANGE UP (ref 1.8–7.4)
NEUTROPHILS NFR BLD AUTO: 65.3 % — SIGNIFICANT CHANGE UP (ref 43–77)
NITRITE UR-MCNC: NEGATIVE — SIGNIFICANT CHANGE UP
NRBC # BLD: 0 /100 WBCS — SIGNIFICANT CHANGE UP (ref 0–0)
NT-PROBNP SERPL-SCNC: 580 PG/ML — HIGH (ref 0–450)
PH UR: 5 — SIGNIFICANT CHANGE UP (ref 5–8)
PLATELET # BLD AUTO: 153 K/UL — SIGNIFICANT CHANGE UP (ref 150–400)
POTASSIUM SERPL-MCNC: 3.8 MMOL/L — SIGNIFICANT CHANGE UP (ref 3.5–5.3)
POTASSIUM SERPL-SCNC: 3.8 MMOL/L — SIGNIFICANT CHANGE UP (ref 3.5–5.3)
PROT SERPL-MCNC: 8.4 GM/DL — HIGH (ref 6–8.3)
PROT UR-MCNC: 30 MG/DL
PROTHROM AB SERPL-ACNC: 11.6 SEC — SIGNIFICANT CHANGE UP (ref 10–12.9)
RBC # BLD: 4.75 M/UL — SIGNIFICANT CHANGE UP (ref 4.2–5.8)
RBC # FLD: 15.1 % — HIGH (ref 10.3–14.5)
RBC CASTS # UR COMP ASSIST: SIGNIFICANT CHANGE UP /HPF (ref 0–4)
SODIUM SERPL-SCNC: 140 MMOL/L — SIGNIFICANT CHANGE UP (ref 135–145)
SP GR SPEC: 1.02 — SIGNIFICANT CHANGE UP (ref 1.01–1.02)
TROPONIN I SERPL-MCNC: <.015 NG/ML — SIGNIFICANT CHANGE UP (ref 0.01–0.04)
UROBILINOGEN FLD QL: NEGATIVE MG/DL — SIGNIFICANT CHANGE UP
WBC # BLD: 6.58 K/UL — SIGNIFICANT CHANGE UP (ref 3.8–10.5)
WBC # FLD AUTO: 6.58 K/UL — SIGNIFICANT CHANGE UP (ref 3.8–10.5)
WBC UR QL: SIGNIFICANT CHANGE UP

## 2018-11-27 PROCEDURE — 99285 EMERGENCY DEPT VISIT HI MDM: CPT

## 2018-11-27 PROCEDURE — 70450 CT HEAD/BRAIN W/O DYE: CPT | Mod: 26

## 2018-11-27 PROCEDURE — 93010 ELECTROCARDIOGRAM REPORT: CPT

## 2018-11-27 PROCEDURE — 71045 X-RAY EXAM CHEST 1 VIEW: CPT | Mod: 26

## 2018-11-27 PROCEDURE — 99223 1ST HOSP IP/OBS HIGH 75: CPT

## 2018-11-27 PROCEDURE — 93970 EXTREMITY STUDY: CPT | Mod: 26

## 2018-11-27 RX ORDER — FERROUS SULFATE 325(65) MG
325 TABLET ORAL DAILY
Qty: 0 | Refills: 0 | Status: DISCONTINUED | OUTPATIENT
Start: 2018-11-27 | End: 2018-11-29

## 2018-11-27 RX ORDER — POLYETHYLENE GLYCOL 3350 17 G/17G
17 POWDER, FOR SOLUTION ORAL DAILY
Qty: 0 | Refills: 0 | Status: DISCONTINUED | OUTPATIENT
Start: 2018-11-27 | End: 2018-11-29

## 2018-11-27 RX ORDER — FUROSEMIDE 40 MG
20 TABLET ORAL DAILY
Qty: 0 | Refills: 0 | Status: DISCONTINUED | OUTPATIENT
Start: 2018-11-27 | End: 2018-11-29

## 2018-11-27 RX ORDER — FUROSEMIDE 40 MG
40 TABLET ORAL ONCE
Qty: 0 | Refills: 0 | Status: COMPLETED | OUTPATIENT
Start: 2018-11-27 | End: 2018-11-27

## 2018-11-27 RX ORDER — BICALUTAMIDE 50 MG/1
50 TABLET, FILM COATED ORAL DAILY
Qty: 0 | Refills: 0 | Status: DISCONTINUED | OUTPATIENT
Start: 2018-11-27 | End: 2018-11-29

## 2018-11-27 RX ORDER — SPIRONOLACTONE 25 MG/1
25 TABLET, FILM COATED ORAL DAILY
Qty: 0 | Refills: 0 | Status: DISCONTINUED | OUTPATIENT
Start: 2018-11-27 | End: 2018-11-29

## 2018-11-27 RX ORDER — DOCUSATE SODIUM 100 MG
100 CAPSULE ORAL
Qty: 0 | Refills: 0 | Status: DISCONTINUED | OUTPATIENT
Start: 2018-11-27 | End: 2018-11-29

## 2018-11-27 RX ORDER — ATORVASTATIN CALCIUM 80 MG/1
40 TABLET, FILM COATED ORAL AT BEDTIME
Qty: 0 | Refills: 0 | Status: DISCONTINUED | OUTPATIENT
Start: 2018-11-27 | End: 2018-11-29

## 2018-11-27 RX ORDER — ACETAMINOPHEN 500 MG
650 TABLET ORAL EVERY 6 HOURS
Qty: 0 | Refills: 0 | Status: DISCONTINUED | OUTPATIENT
Start: 2018-11-27 | End: 2018-11-29

## 2018-11-27 RX ORDER — ENOXAPARIN SODIUM 100 MG/ML
30 INJECTION SUBCUTANEOUS EVERY 24 HOURS
Qty: 0 | Refills: 0 | Status: DISCONTINUED | OUTPATIENT
Start: 2018-11-27 | End: 2018-11-29

## 2018-11-27 RX ADMIN — ENOXAPARIN SODIUM 30 MILLIGRAM(S): 100 INJECTION SUBCUTANEOUS at 12:50

## 2018-11-27 RX ADMIN — Medication 40 MILLIGRAM(S): at 11:24

## 2018-11-27 RX ADMIN — Medication 100 MILLIGRAM(S): at 17:03

## 2018-11-27 RX ADMIN — ATORVASTATIN CALCIUM 40 MILLIGRAM(S): 80 TABLET, FILM COATED ORAL at 21:22

## 2018-11-27 NOTE — H&P ADULT - ASSESSMENT
89m status post fall with dizziness and short of breath requiring at least two days in the hospital       IMPROVE VTE Individual Risk Assessment    RISK                                                          Points  [] Previous VTE                                           3  [] Thrombophilia                                        2  [] Lower limb paralysis                              2   [] Current Cancer                                       2   [] Immobilization > 24 hrs                        1  [] ICU/CCU stay > 24 hours                       1  [] Age > 60                                                   1    IMPROVE VTE Score:2

## 2018-11-27 NOTE — PHYSICAL THERAPY INITIAL EVALUATION ADULT - IMPAIRMENTS FOUND, PT EVAL
aerobic capacity/endurance/gross motor/ROM/ergonomics and body mechanics/gait, locomotion, and balance/muscle strength

## 2018-11-27 NOTE — ED ADULT TRIAGE NOTE - CHIEF COMPLAINT QUOTE
Fell yesterday and again today, No LOC, bilateral leg swollen      Fell yesterday and today, legs swollen, No LOC

## 2018-11-27 NOTE — PHYSICAL THERAPY INITIAL EVALUATION ADULT - PERTINENT HX OF CURRENT PROBLEM, REHAB EVAL
Pt admitted with dx CHF , Dizziness, Falls. Pt stated he fell  in his house but did not really hurt himself, Radiologic studies with no acute fractures

## 2018-11-27 NOTE — ED PROVIDER NOTE - OBJECTIVE STATEMENT
89 years old male by ems with his aide c/o pt has been falling due to dizziness and sob on exertion and bilateral legs swelling for three days. Pt sts he was dizziness and fell at 6:00 am in his bed room and he could not get up from the floor. Pt is alert and oriented x 3 denies trauma to the head, loc, headache, neck/back/hips pain, blurred visions, light sensitivities, focal/distal weakness or numbness, cough, chest pain, nausea, vomiting, fever, chills, abd pain, dysuria or irregular bowel movements.

## 2018-11-27 NOTE — H&P ADULT - NSHPLABSRESULTS_GEN_ALL_CORE
13.4   6.58  )-----------( 153      ( 27 Nov 2018 10:20 )             42.4   11-27    140  |  106  |  30<H>  ----------------------------<  77  3.8   |  23  |  1.79<H>    Ca    9.3      27 Nov 2018 10:20  Mg     2.5     11-27    TPro  8.4<H>  /  Alb  3.7  /  TBili  0.6  /  DBili  x   /  AST  31  /  ALT  26  /  AlkPhos  72  11-27  < from: US Duplex Venous Lower Ext Complete, Bilateral (11.27.18 @ 11:02) >      No evidence of bilateral lower extremity deep venous thrombosis.      < from: CT Head No Cont (11.27.18 @ 10:59) >    Impression: No acuteintracranial hemorrhage. No CT evidence for an acute   territorial infarct.    Chronic small vessel ischemic disease.    No evidence for a skull fracture. 13.4   6.58  )-----------( 153      ( 27 Nov 2018 10:20 )             42.4   11-27    140  |  106  |  30<H>  ----------------------------<  77  3.8   |  23  |  1.79<H>    Ca    9.3      27 Nov 2018 10:20  Mg     2.5     11-27    TPro  8.4<H>  /  Alb  3.7  /  TBili  0.6  /  DBili  x   /  AST  31  /  ALT  26  /  AlkPhos  72  11-27  < from: US Duplex Venous Lower Ext Complete, Bilateral (11.27.18 @ 11:02) >      No evidence of bilateral lower extremity deep venous thrombosis.      < from: CT Head No Cont (11.27.18 @ 10:59) >    Impression: No acuteintracranial hemorrhage. No CT evidence for an acute     ekg- shortened pr interval   territorial infarct.    Chronic small vessel ischemic disease.    No evidence for a skull fracture.

## 2018-11-27 NOTE — H&P ADULT - NSHPPHYSICALEXAM_GEN_ALL_CORE
GENERAL: NAD well-developed  HEAD:  Atraumatic, Normocephalic  EYES: EOMI, PERRLA, conjunctiva and sclera clear  ENMT: No tonsillar erythema, exudates, or enlargement; Moist mucous membranes, Good dentition, No lesions  NECK: Supple, No JVD, Normal thyroid  NERVOUS SYSTEM:  Alert & Oriented X3, Good concentration; Motor Strength 5/5 B/L upper and lower extremities; DTRs 2+ intact and symmetric  CHEST/LUNG: Clear to percussion bilaterally; No rales, rhonchi, wheezing, or rubs  HEART: Regular rate and rhythm; No murmurs, rubs, or gallops  ABDOMEN: Soft, Nontender, Nondistended; Bowel sounds present  EXTREMITIES:  bilateral pitting edema to knees   LYMPH: No lymphadenopathy   SKIN: No rashes or lesions ICU Vital Signs Last 24 Hrs  T(C): 36.1 (27 Nov 2018 17:00), Max: 36.9 (27 Nov 2018 09:29)  T(F): 97 (27 Nov 2018 17:00), Max: 98.5 (27 Nov 2018 09:29)  HR: 65 (27 Nov 2018 17:00) (65 - 73)  BP: 135/75 (27 Nov 2018 17:00) (110/67 - 151/91)  BP(mean): --  ABP: --  ABP(mean): --  RR: 16 (27 Nov 2018 17:00) (16 - 16)  SpO2: 99% (27 Nov 2018 17:00) (94% - 100%)      GENERAL: NAD well-developed  HEAD:  Atraumatic, Normocephalic  EYES: EOMI, PERRLA, conjunctiva and sclera clear  ENMT: No tonsillar erythema, exudates, or enlargement; Moist mucous membranes, Good dentition, No lesions  NECK: Supple, No JVD, Normal thyroid  NERVOUS SYSTEM:  Alert & Oriented X3, Good concentration; Motor Strength 5/5 B/L upper and lower extremities; DTRs 2+ intact and symmetric  CHEST/LUNG: Clear to percussion bilaterally; No rales, rhonchi, wheezing, or rubs  HEART: Regular rate and rhythm; No murmurs, rubs, or gallops  ABDOMEN: Soft, Nontender, Nondistended; Bowel sounds present  EXTREMITIES:  bilateral pitting edema to knees   LYMPH: No lymphadenopathy   SKIN: No rashes or lesions

## 2018-11-27 NOTE — ED PROVIDER NOTE - NEURO NEGATIVE STATEMENT, MLM
71 year old female past medical history of atrial fibrillation/ablation, CABG, myocardial infarction on comadin states that she is having shortness of breath and chest pain. patient states that she was cardioverted last week after atrial fibrillation episode. patient states that tonight shortness of breath is worse and nebs arent helping.
no loss of consciousness, no gait abnormality, no headache, no sensory deficits, and no weakness.

## 2018-11-27 NOTE — ED PROVIDER NOTE - CARE PLAN
Principal Discharge DX:	CHF (congestive heart failure)  Secondary Diagnosis:	Dizziness  Secondary Diagnosis:	Falls frequently

## 2018-11-27 NOTE — ED PROVIDER NOTE - CONSTITUTIONAL, MLM
normal... Well appearing, well nourished, awake, alert, oriented to person, place, time/situation and in no apparent distress. Speaking in clear full sentences no nasal flaring no shoulders retractions, not holding his head/chest/abdomen, smiling appears comfortable sitting up in the stretcher in a bright light room.

## 2018-11-27 NOTE — PHYSICAL THERAPY INITIAL EVALUATION ADULT - WEIGHT-BEARING RESTRICTIONS: SIT/STAND, REHAB EVAL
[facility]    [address] [facility] [place] [address]    Phone:  [phone]       Thank You for choosing us for your health care visit. We are glad to serve you and happy to provide you with this summary of your visit. Please help us to ensure we have accurate records. If you find anything that needs to be changed, please let our staff know as soon as possible.          Your Demographic Information     Patient Name Sex     Kamila Art Female 1978       Ethnic Group Patient Race    Not of  or  Origin Black/      Your Visit Details     Date & Time Provider Department    2017 2:40 PM Breann Goins MD [facility]      Your To Do List     Follow-Up    Return in about 4 weeks (around 2017), or if symptoms worsen or fail to improve, for f/u bp.      Conditions Discussed Today or Order-Related Diagnoses        Comments    Adjustment disorder, unspecified type    -  Primary     Elevated blood pressure reading         Insomnia, unspecified type         Chronic neck pain         Chronic back pain, unspecified back location, unspecified back pain laterality           Your Vitals Were     BP Pulse Weight LMP SpO2 BMI    156/80 73 234 lb 6.4 oz (106.3 kg) 2016 100% 36.71 kg/m2    Smoking Status                   Never Smoker           Medications Prescribed or Re-Ordered Today     ibuprofen (MOTRIN) 800 MG tablet    Sig - Route: Take 1 tablet by mouth every 8 hours as needed for Pain. - Oral    Class: Eprescribe    [facility] [address] [place] [address] [address] AT [facility] &  Ph #: [phone]    lidocaine (LIDODERM) 5 % patch    Sig - Route: Place 3 patches onto the skin every 24 hours. Remove patch 12 hours after applying - Transdermal    Class: Eprescribe    Pharmacy: [facility] [address] [place] [address] [address] AT [facility] &  Ph #:  [phone]    cyclobenzaprine (FLEXERIL) 10 MG tablet    Sig - Route: Take 1 tablet by mouth 2 times daily as needed for Muscle spasms. - Oral    Class: Eprescribe    Pharmacy: [facility] [address] [address] AT [facility] [address] Ph #: [phone]      Your Current Medications Are        Disp Refills Start End    lidocaine (LIDODERM) 5 % patch 90 patch 6 6/7/2017     Sig - Route: Place 3 patches onto the skin every 24 hours. Remove patch 12 hours after applying - Transdermal    Class: Eprescribe    cyclobenzaprine (FLEXERIL) 10 MG tablet 30 tablet 3 6/7/2017     Sig - Route: Take 1 tablet by mouth 2 times daily as needed for Muscle spasms. - Oral    Class: Eprescribe    HYDROcodone-acetaminophen (NORCO) 5-325 MG per tablet 30 tablet 0 6/28/2016     Sig - Route: Take 1 tablet by mouth every 6 hours as needed for Pain. - Oral    docusate sodium (COLACE) 100 MG capsule 40 capsule 1 6/28/2016     Sig - Route: Take 1 capsule by mouth 2 times daily. - Oral    levonorgestrel-ethinyl estradiol (AVIANE,ALESSE,LESSINA) 0.1-20 MG-MCG per tablet 84 tablet 5 2/25/2016     Sig - Route: Take 1 tablet by mouth daily. - Oral    Class: Eprescribe    Cholecalciferol (VITAMIN D) 2000 UNITS tablet   11/18/2014     Sig - Route: Take 2,000 Units by mouth daily. - Oral    Class: Historical Med    ibuprofen (MOTRIN) 800 MG tablet 60 tablet 3 6/7/2017     Sig - Route: Take 1 tablet by mouth every 8 hours as needed for Pain. - Oral    Class: Eprescribe      Allergies     No Known Allergies      Immunizations History as of 6/7/2017     Name Date    Hepatitis A - Adult 12/28/1998    INFLUENZA QUADRIVALENT 10/21/2016  4:00 PM, 10/22/2014    Influenza 10/1/2015, 10/29/2012, 11/7/2011, 10/23/2007    Influenza A novel H1N1 12/1/2009    Influenza Preservative Free 12/30/2013    Polio, ORAL 12/7/1989    Td:Adult type tetanus/diphtheria 12/28/1998, 12/7/1989    Tdap 8/5/2013      Problem List as of 6/7/2017      Recurrent vaginitis    Encounter for birth control pills maintenance    h/o CHRISTINA III pap (path CHRISTINA 1-2)  s/p LEEP 9/2013: pap q 6 months until 9/2015    AR (allergic rhinitis)    Prediabetes    Vitamin D deficiency    Uterine fibroids: uterus 12 cm: gyne ref    Excessive bleeding in premenopausal period/fibroids: gyne              Patient Instructions    Trial:  1 wk:  No motrin/ibuprofen:  Use Tylenol 1000mg 3 times daily as needed for discomfort.          Blood Pressure Management:   Continue with DASH: see below.      Sodium <1500 mg daily    Ensure that you get Vit D3 2000IU daily and Calcium 500 mg twice daily (Low fat dairy 3 servings daily (1% or skim).    Monitor blood pressure once weekly.   Call office for appt the next day for any top  or above or bottom  or above.    Goal BP in office <140/<90.    Coricidin HBP, cold medicine you can take when you are watching your blood pressure.    Avoid decongestants, like Sudafed and regular Antiinflammatory medicines like Aleve/Naprosyn, Ibuprofen/Motrin, etc as  they can raise your blood pressure.         DASH Eating Plan (Dietary Approaches to Stop Hypertension)   This guide has been prepared for your use by registered dietitians. If you have questions or concerns, please call the nearest [place] facility to contact a dietitian. Diet counseling is available to address your specific needs.     People with high blood pressure, and those in danger of getting high blood pressure, can benefit from the DASH (dietary approaches to stop hypertension) eating plan. This plan follows   heart-healthy guidelines. It limits unhealthy fats and sodium. It focuses on foods with nutrients that can help lower blood pressure. These include   potassium, calcium, magnesium, protein and fiber.   The DASH eating plan below promotes healthy eating and gradual weight loss. It is based on:   • 1,600 calories for most women and inactive men   • 2,000 calories for most men and very  active women   • It is recommended most people reduce sodium to less than 1,500 milligrams (mg) per day. Younger, healthy people without risk for hypertension should reduce to 2,300 mg.   Who needs 1,500 milligrams (mg)?   • People age 51 and older   • People with high blood pressure or hypertension   • People with diabetes   • People with chronic kidney disease   • African Americans     To plan meals and your grocery shopping list, use the following chart:   High blood pressure can be controlled or prevented by taking these steps:   • Focus food choices on fruits, vegetables,   low-fat dairy, lean poultry, fish or meats, nuts and whole grains   • Choose foods lower in salt; read food labels   • Maintain a healthy weight   • Be moderately active most days   • If you drink alcohol, do so moderately   • Stop smoking   • If you have high blood pressure and are prescribed pills, take as directed Food group  Daily servings   1,600 calories 2,000 calories  Serving sizes  Examples and notes    Grains and cereals  6  7 to 8  1 slice bread   1 oz. dry cereal   1/2 cup cooked rice, pasta or cereal  Whole-wheat bread, English muffin, hammad bread, bagel, cereal, grits, oatmeal, unsalted crackers, unsalted pretzels and unsalted popcorn    Vegetables  3 to 4  4 to 5  1 cup raw leafy vegetables   1/2 cup cooked   vegetables   6 oz. low-sodium vegetable juice  Tomatoes, potatoes, carrots, green peas, squash, broccoli, turnip greens, collards, kale, spinach, artichokes and green beans          Start Vitamin D 2000IU daily, Fishoil 1000mg daily, 30-60 mins of daily exercise.    [facility]  Sheldon Caldwell MBA, MSW, LCSW, LMFT  Licensed Marriage & Family Therapist  [facility]  [address] [facility]  T: [phone]  [email or web address]    WHAT MUST I DO TO HELP MYSELF?  Exercise:  This is not optional anymore if you are serious about feeling better.  It is so important that it is now  recommended by the National Institutes of Mental Health.  Strenuous exercise is not necessary.  Walking is excellent.  Start low and make a chart.  Walking outside is even better.  Even cold cloudy days have more fresh air and sunlight than indoors and these can be helpful as well.  Some depressions are very sensitive to light and improve greatly with light, either artificial or natural.  Help Others:  Paradoxically, the time you feel that you need the most help, is the time you benefit from helping others the most;  Volunteer, baby sit, help your neighbor repair something, help a disabled community member, cook.  Eat well:   A low fat diet will help avoid that tired feeling after meals than can accentuate depression.  Any diet that helps you lose 5 to 10 pounds will increase your energy. If you are a terrible eater, at least admit it enough to eat a high fiber cereal and take a vitamin.  Turn off the TV:  The news is depressing to everyone.  Almost none of it is your fault, nor can most of us do anything about most of the events.  Commercials make you eat more.   Watching TV takes the place of exercise which will make you feel better.  If you have low energy, get up during commercials and walk once around the room  If drinking, Stop drinking:  If you need help let your doctor know.  Schedule pleasant activities:  Rent comedies, play games, cancel one stressful activity.  Learn jokes.  Seek out upbeat people.  Learn stress management:  Take a course in yoga or stress management.        Stress Relief: Relaxation  Focusing the mind helps provide stress relief. Taking 5 to 10 minutes to practice relaxation each day helps you feel more refreshed. The following exercises can be done almost anywhere. Try one or more until you find what works best for you.  Calm Your Mind  Find a quiet place where you won't be disturbed. Then try the following:  · Sit comfortably. Take off your shoes. Turn off your cell phone and pager.  Take a few deep breaths.  · Focus your mind on one peaceful thought, image, or word. Then try to hold that thought for 5 minutes.  · When other thoughts enter your mind, relax and refocus. Let the invading thoughts fall away.  · When you're done, stand up slowly and stretch your arms over your head. With practice, this exercise can help you feel restored.    Calm Your Body  With practice, you can use mental cues to tell your body how to feel.  · Sit comfortably and clear your mind. A few deep breaths will help.  · Mentally focus on your left hand and repeat to yourself, \"My left hand feels warm and heavy.\" Keep doing this until your hand does feel heavier and warmer.  · Repeat the exercise using your right hand. Then focus on your arms, legs, and feet until your whole body feels relaxed.  · When you're done, stand up slowly and stretch your arms overhead.    Visualization  Visualization is like taking a mental vacation. It frees your mind while keeping your body in a calm state. To get started, picture yourself feeling warm and relaxed. Choose a peaceful setting that appeals to you and fill in the details. If you imagine a tropical beach, listen to the waves on the shore. Feel the sun on your face. Dig your toes in the sand. By using the power of your mind, you can take a soothing break when you need to.  © 8239-4413 [employer] [facility], [address] [place] [address], [place] [address]. All rights reserved. This information is not intended as a substitute for professional medical care. Always follow your healthcare professional's instructions.          Neck/Back Pain: General    Both neck and back pain are usually caused by injury to the muscles or ligaments of the spine. Sometimes the disks that separate each bone of the spine may cause pain by pressing on a nearby nerve. Back and neck pain may appear after a sudden twisting/bending force (such as in a car accident), or sometimes after a simple awkward movement. In either  case, muscle spasm is often present and adds to the pain.  Acute neck and back pain usually gets better in 1 to 2 weeks. Pain related to disk disease, arthritis in the spinal joints or spinal stenosis (narrowing of the spinal canal) can become chronic and last for months or years.  Back and neck pain are common problems. Most people feel better in 1 or 2 weeks, and most of the rest in 1 to 2 months. Most people can remain active.  Symptoms  People experience and describe pain differently.  · Pain can be sharp, stabbing, shooting, aching, cramping, or burning  · Movement, standing, bending, lifting, sitting, or walking may worsen the pain  · Pain can be localized to one spot or area, or it can be more generalized  · Pain can spread or radiate upwards, downwards, to the front, or go down your arms  · Muscle spasm may occur.  Cause  Most of the time \"mechanical problems\" with the muscles or spine cause the pain. it is usually caused by an injury, whether known or not, to the muscles or ligaments. While illnesses can cause back pain, it is usually not caused by a serious illness. Pain is usually related to physical activity, whether sports, exercise, work, or normal activity. Sometimes it can occur without an identifiable cause. This can happen simply by stretching or moving wrong, without noting pain at the time. Other causes include:  · Overexertion, lifting, pushing, pulling incorrectly or too aggressively.  · Sudden twisting, bending or stretching from an accident (car or fall), or accidental movement.  · Poor posture  · Poor conditioning, lack of regular exercise  · Spinal disc disease or arthritis  · Stress  · Pregnancy, or illness like appendicitis, bladder or kidney infection, pelvic infections   Home care  · For neck pain: Use a comfortable pillow that supports the head and keeps the spine in a neutral position. The position of the head should not be tilted forward or backward.  · When in bed, try to find a  full weight-bearing position of comfort. A firm mattress is best. Try lying flat on your back with pillows under your knees. You can also try lying on your side with your knees bent up towards your chest and a pillow between your knees.  · At first, do not try to stretch out the sore spots. If there is a strain, it is not like the good soreness you get after exercising without an injury. In this case, stretching may make it worse.  · Avoid prolonged sitting, long car rides or travel. This puts more stress on the lower back than standing or walking.  · During the first 24 to 72 hours after an injury, apply an ice pack to the painful area for 20 minutes and then remove it for 20 minutes over a period of 60 to 90 minutes or several times a day. As a safety precaution, do not use a heating pad at bedtime. Sleeping with a heating pad can lead to skin burns or tissue damage.  · Ice and heat therapies can be alternated. Talk with your health care provider about the best treatment for your back or neck pain.  · Therapeutic massage can help relax the back and neck muscles without stretching them.  · Be aware of safe lifting methods and do not lift anything over 15 pounds until all the pain is gone.  Medications  Talk to your health care provider before using medications, especially if you have other medical problems or are taking other medicines.  · You may use acetaminophen (such as Tylenol) or ibuprofen (such as Advil or Motrin) to control pain, unless another pain medicine was prescribed. If you have chronic conditions like diabetes, liver or kidney disease, stomach ulcers,  gastrointestinal bleeding, or are taking blood thinner medications.  · Be careful if you are given pain medicines, narcotics, or medication for muscle spasm. They can cause drowsiness, and can affect your coordination, reflexes, and judgment. Do not drive or operate heavy machinery.  Follow-up care  Follow up with your health care provider if your symptoms do not start  to improve after one week. Physical therapy or further tests may be needed.  If X-rays were taken, they will be reviewed by a radiologist. You will be notified of any new findings that may affect your care.  Call 911  Seek emergency medical care if any of the following occur:  · Trouble breathing  · Confusion  · Very drowsy or trouble awakening  · Fainting or loss of consciousness  · Rapid or very slow heart rate  · Loss of bowel or bladder control  When to seek medical care  Get prompt medical attention if any of the following occur:  · Pain becomes worse or spreads into your arms or legs  · Weakness, numbness or pain in one or both arms or legs  · Numbness in the groin area  · Difficulty walking  · Fever of 100.4ºF (38ºC) or higher, or as directed by your healthcare provider  © 5071-4136 [employer]. [address] [place] [address]. All rights reserved. This information is not intended as a substitute for professional medical care. Always follow your healthcare professional's instructions.        Neck/Back Pain: General    Both neck and back pain are usually caused by injury to the muscles or ligaments of the spine. Sometimes the disks that separate each bone of the spine may cause pain by pressing on a nearby nerve. Back and neck pain may appear after a sudden twisting/bending force (such as in a car accident), or sometimes after a simple awkward movement. In either case, muscle spasm is often present and adds to the pain.  Acute neck and back pain usually gets better in 1 to 2 weeks. Pain related to disk disease, arthritis in the spinal joints or spinal stenosis (narrowing of the spinal canal) can become chronic and last for months or years.  Back and neck pain are common problems. Most people feel better in 1 or 2 weeks, and most of the rest in 1 to 2 months. Most people can remain active.  Symptoms  People experience and describe pain differently.  · Pain can be sharp, stabbing,  shooting, aching, cramping, or burning  · Movement, standing, bending, lifting, sitting, or walking may worsen the pain  · Pain can be localized to one spot or area, or it can be more generalized  · Pain can spread or radiate upwards, downwards, to the front, or go down your arms  · Muscle spasm may occur.  Cause  Most of the time \"mechanical problems\" with the muscles or spine cause the pain. it is usually caused by an injury, whether known or not, to the muscles or ligaments. While illnesses can cause back pain, it is usually not caused by a serious illness. Pain is usually related to physical activity, whether sports, exercise, work, or normal activity. Sometimes it can occur without an identifiable cause. This can happen simply by stretching or moving wrong, without noting pain at the time. Other causes include:  · Overexertion, lifting, pushing, pulling incorrectly or too aggressively.  · Sudden twisting, bending or stretching from an accident (car or fall), or accidental movement.  · Poor posture  · Poor conditioning, lack of regular exercise  · Spinal disc disease or arthritis  · Stress  · Pregnancy, or illness like appendicitis, bladder or kidney infection, pelvic infections   Home care  · For neck pain: Use a comfortable pillow that supports the head and keeps the spine in a neutral position. The position of the head should not be tilted forward or backward.  · When in bed, try to find a position of comfort. A firm mattress is best. Try lying flat on your back with pillows under your knees. You can also try lying on your side with your knees bent up towards your chest and a pillow between your knees.  · At first, do not try to stretch out the sore spots. If there is a strain, it is not like the good soreness you get after exercising without an injury. In this case, stretching may make it worse.  · Avoid prolonged sitting, long car rides or travel. This puts more stress on the lower back than standing or  walking.  · During the first 24 to 72 hours after an injury, apply an ice pack to the painful area for 20 minutes and then remove it for 20 minutes over a period of 60 to 90 minutes or several times a day. As a safety precaution, do not use a heating pad at bedtime. Sleeping with a heating pad can lead to skin burns or tissue damage.  · Ice and heat therapies can be alternated. Talk with your health care provider about the best treatment for your back or neck pain.  · Therapeutic massage can help relax the back and neck muscles without stretching them.  · Be aware of safe lifting methods and do not lift anything over 15 pounds until all the pain is gone.  Medications  Talk to your health care provider before using medications, especially if you have other medical problems or are taking other medicines.  · You may use acetaminophen (such as Tylenol) or ibuprofen (such as Advil or Motrin) to control pain, unless another pain medicine was prescribed. If you have chronic conditions like diabetes, liver or kidney disease, stomach ulcers,  gastrointestinal bleeding, or are taking blood thinner medications.  · Be careful if you are given pain medicines, narcotics, or medication for muscle spasm. They can cause drowsiness, and can affect your coordination, reflexes, and judgment. Do not drive or operate heavy machinery.  Follow-up care  Follow up with your health care provider if your symptoms do not start to improve after one week. Physical therapy or further tests may be needed.  If X-rays were taken, they will be reviewed by a radiologist. You will be notified of any new findings that may affect your care.  Call 911  Seek emergency medical care if any of the following occur:  · Trouble breathing  · Confusion  · Very drowsy or trouble awakening  · Fainting or loss of consciousness  · Rapid or very slow heart rate  · Loss of bowel or bladder control  When to seek medical care  Get prompt medical attention if any of the  following occur:  · Pain becomes worse or spreads into your arms or legs  · Weakness, numbness or pain in one or both arms or legs  · Numbness in the groin area  · Difficulty walking  · Fever of 100.4ºF (38ºC) or higher, or as directed by your healthcare provider  © 3512-4597 [employer]. [address] [place] [address]. All rights reserved. This information is not intended as a substitute for professional medical care. Always follow your healthcare professional's instructions.

## 2018-11-27 NOTE — ED ADULT NURSE NOTE - OBJECTIVE STATEMENT
89 yr old m brodiesandhya s/p fall at home yesterday   again today, No LOC, bilateral leg pitting edema +3, pt complained of lower back pain, shows no signs of respiratory distress, vital signs stable, bed fast home aide at bedside, will continue to monitor

## 2018-11-28 LAB
ANION GAP SERPL CALC-SCNC: 8 MMOL/L — SIGNIFICANT CHANGE UP (ref 5–17)
BUN SERPL-MCNC: 29 MG/DL — HIGH (ref 7–23)
CALCIUM SERPL-MCNC: 8.8 MG/DL — SIGNIFICANT CHANGE UP (ref 8.5–10.1)
CHLORIDE SERPL-SCNC: 106 MMOL/L — SIGNIFICANT CHANGE UP (ref 96–108)
CO2 SERPL-SCNC: 29 MMOL/L — SIGNIFICANT CHANGE UP (ref 22–31)
CREAT SERPL-MCNC: 1.45 MG/DL — HIGH (ref 0.5–1.3)
CULTURE RESULTS: SIGNIFICANT CHANGE UP
GLUCOSE SERPL-MCNC: 81 MG/DL — SIGNIFICANT CHANGE UP (ref 70–99)
HCT VFR BLD CALC: 39.8 % — SIGNIFICANT CHANGE UP (ref 39–50)
HGB BLD-MCNC: 12.2 G/DL — LOW (ref 13–17)
MCHC RBC-ENTMCNC: 27.5 PG — SIGNIFICANT CHANGE UP (ref 27–34)
MCHC RBC-ENTMCNC: 30.7 GM/DL — LOW (ref 32–36)
MCV RBC AUTO: 89.6 FL — SIGNIFICANT CHANGE UP (ref 80–100)
NRBC # BLD: 0 /100 WBCS — SIGNIFICANT CHANGE UP (ref 0–0)
PLATELET # BLD AUTO: 135 K/UL — LOW (ref 150–400)
POTASSIUM SERPL-MCNC: 3.9 MMOL/L — SIGNIFICANT CHANGE UP (ref 3.5–5.3)
POTASSIUM SERPL-SCNC: 3.9 MMOL/L — SIGNIFICANT CHANGE UP (ref 3.5–5.3)
RBC # BLD: 4.44 M/UL — SIGNIFICANT CHANGE UP (ref 4.2–5.8)
RBC # FLD: 15.1 % — HIGH (ref 10.3–14.5)
SODIUM SERPL-SCNC: 143 MMOL/L — SIGNIFICANT CHANGE UP (ref 135–145)
SPECIMEN SOURCE: SIGNIFICANT CHANGE UP
WBC # BLD: 5.12 K/UL — SIGNIFICANT CHANGE UP (ref 3.8–10.5)
WBC # FLD AUTO: 5.12 K/UL — SIGNIFICANT CHANGE UP (ref 3.8–10.5)

## 2018-11-28 PROCEDURE — 99233 SBSQ HOSP IP/OBS HIGH 50: CPT

## 2018-11-28 RX ADMIN — Medication 325 MILLIGRAM(S): at 12:33

## 2018-11-28 RX ADMIN — ATORVASTATIN CALCIUM 40 MILLIGRAM(S): 80 TABLET, FILM COATED ORAL at 21:12

## 2018-11-28 RX ADMIN — Medication 100 MILLIGRAM(S): at 17:29

## 2018-11-28 RX ADMIN — SPIRONOLACTONE 25 MILLIGRAM(S): 25 TABLET, FILM COATED ORAL at 05:22

## 2018-11-28 RX ADMIN — ENOXAPARIN SODIUM 30 MILLIGRAM(S): 100 INJECTION SUBCUTANEOUS at 12:33

## 2018-11-28 RX ADMIN — BICALUTAMIDE 50 MILLIGRAM(S): 50 TABLET, FILM COATED ORAL at 12:33

## 2018-11-28 RX ADMIN — Medication 20 MILLIGRAM(S): at 05:22

## 2018-11-28 RX ADMIN — Medication 100 MILLIGRAM(S): at 05:22

## 2018-11-28 NOTE — DIETITIAN INITIAL EVALUATION ADULT. - PERTINENT LABORATORY DATA
11-28 Na 143 mmol/L Glu 81 mg/dL K+ 3.9 mmol/L Cr  1.45 mg/dL<H> BUN 29 mg/dL<H> Phos n/a   Alb n/a   PAB n/a   Hgb 12.2 g/dL<L> Hct 39.8 %

## 2018-11-28 NOTE — PROGRESS NOTE ADULT - ASSESSMENT
89m status post fall with dizziness and short of breath requiring at least two days in the hospital       IMPROVE VTE Individual Risk Assessment    RISK                                                          Points  [] Previous VTE                                           3  [] Thrombophilia                                        2  [] Lower limb paralysis                              2   [] Current Cancer                                       2   [] Immobilization > 24 hrs                        1  [] ICU/CCU stay > 24 hours                       1  [] Age > 60                                                   1    IMPROVE VTE Score:2 89m status post fall with dizziness and short of breath requiring at least two days in the hospital     NO ACUTE EVENTS WILL need to discuss reghab for weakness vs going home

## 2018-11-28 NOTE — DIETITIAN INITIAL EVALUATION ADULT. - PERTINENT MEDS FT
MEDICATIONS  (STANDING):  atorvastatin 40 milliGRAM(s) Oral at bedtime  bicalutamide 50 milliGRAM(s) Oral daily  docusate sodium 100 milliGRAM(s) Oral two times a day  enoxaparin Injectable 30 milliGRAM(s) SubCutaneous every 24 hours  ferrous    sulfate 325 milliGRAM(s) Oral daily  furosemide    Tablet 20 milliGRAM(s) Oral daily  spironolactone 25 milliGRAM(s) Oral daily    MEDICATIONS  (PRN):  acetaminophen   Tablet .. 650 milliGRAM(s) Oral every 6 hours PRN Mild Pain (1 - 3)  polyethylene glycol 3350 17 Gram(s) Oral daily PRN Constipation

## 2018-11-28 NOTE — DIETITIAN INITIAL EVALUATION ADULT. - PHYSICAL APPEARANCE
underweight/BMI- 22.5; +1 edema R/L leg; Nutrition focused physical exam conducted; Subcutaneous fat loss: [ ] Orbital fat pads region, [ ]Buccal fat region, [ ]Triceps region,  [ ]Ribs region.  Muscle wasting: [ ]Temples region, [ ]Clavicle region, [ ]Shoulder region, [ ]Scapula region, [ ]Interosseous region,  [ ]thigh region, [ ]Calf region BMI- 22.5; +1 edema R/L leg; Nutrition focused physical exam conducted; Subcutaneous fat loss: [moderate ] Orbital fat pads region, [moderate ]Buccal fat region, [severe]Triceps region,  [severe ]Ribs region.  Muscle wasting: [severe ]Temples region, [severe ]Clavicle region, [severe ]Shoulder region, [unable ]Scapula region, [mild ]Interosseous region,  [unable ]thigh region, [unable]Calf region/underweight

## 2018-11-28 NOTE — DIETITIAN INITIAL EVALUATION ADULT. - NS AS NUTRI INTERV ED CONTENT
Purpose of the nutrition education/Provided pt with Heart Healthy Reduced Sodium Handout/Survival information/Nutrition relationship to health/disease/Priority modifications/Recommended modifications Provided pt with Heart Healthy Reduced Sodium Handout & reviewed the importance monitoring daily wts/Recommended modifications/Priority modifications/Survival information/Purpose of the nutrition education/Nutrition relationship to health/disease

## 2018-11-28 NOTE — PROGRESS NOTE ADULT - SUBJECTIVE AND OBJECTIVE BOX
Patient is a 89y old  Male who presents with a chief complaint of fall (2018 11:14)      INTERVAL HPI/OVERNIGHT EVENTS:    MEDICATIONS  (STANDING):  atorvastatin 40 milliGRAM(s) Oral at bedtime  bicalutamide 50 milliGRAM(s) Oral daily  docusate sodium 100 milliGRAM(s) Oral two times a day  enoxaparin Injectable 30 milliGRAM(s) SubCutaneous every 24 hours  ferrous    sulfate 325 milliGRAM(s) Oral daily  furosemide    Tablet 20 milliGRAM(s) Oral daily  spironolactone 25 milliGRAM(s) Oral daily    MEDICATIONS  (PRN):  acetaminophen   Tablet .. 650 milliGRAM(s) Oral every 6 hours PRN Mild Pain (1 - 3)  polyethylene glycol 3350 17 Gram(s) Oral daily PRN Constipation      Allergies    No Known Allergies    Intolerances        REVIEW OF SYSTEMS:  CONSTITUTIONAL: No fever, weight loss, or fatigue  EYES: No eye pain, visual disturbances, or discharge  ENMT:  No difficulty hearing, tinnitus, vertigo; No sinus or throat pain  NECK: No pain or stiffness  BREASTS: No pain, masses, or nipple discharge  RESPIRATORY: No cough, wheezing, chills or hemoptysis; No shortness of breath  CARDIOVASCULAR: No chest pain, palpitations, dizziness, or leg swelling  GASTROINTESTINAL: No abdominal or epigastric pain. No nausea, vomiting, or hematemesis; No diarrhea or constipation. No melena or hematochezia.  GENITOURINARY: No dysuria, frequency, hematuria, or incontinence  NEUROLOGICAL: No headaches, memory loss, loss of strength, numbness, or tremors  SKIN: No itching, burning, rashes, or lesions   LYMPH NODES: No enlarged glands  ENDOCRINE: No heat or cold intolerance; No hair loss  MUSCULOSKELETAL: No joint pain or swelling; No muscle, back, or extremity pain  PSYCHIATRIC: No depression, anxiety, mood swings, or difficulty sleeping  HEME/LYMPH: No easy bruising, or bleeding gums  ALLERGY AND IMMUNOLOGIC: No hives or eczema    Vital Signs Last 24 Hrs  T(C): 36.5 (2018 12:05), Max: 36.7 (2018 23:18)  T(F): 97.7 (2018 12:05), Max: 98 (2018 23:18)  HR: 61 (2018 17:31) (61 - 69)  BP: 118/71 (2018 17:31) (118/71 - 129/75)  BP(mean): --  RR: 18 (2018 17:31) (17 - 18)  SpO2: 98% (2018 17:31) (95% - 100%)    PHYSICAL EXAM:  GENERAL: NAD, well-groomed, well-developed  HEAD:  Atraumatic, Normocephalic  EYES: EOMI, PERRLA, conjunctiva and sclera clear  ENMT: No tonsillar erythema, exudates, or enlargement; Moist mucous membranes, Good dentition, No lesions  NECK: Supple, No JVD, Normal thyroid  NERVOUS SYSTEM:  Alert & Oriented X3, Good concentration; Motor Strength 5/5 B/L upper and lower extremities; DTRs 2+ intact and symmetric  CHEST/LUNG: Clear to percussion bilaterally; No rales, rhonchi, wheezing, or rubs  HEART: Regular rate and rhythm; No murmurs, rubs, or gallops  ABDOMEN: Soft, Nontender, Nondistended; Bowel sounds present  EXTREMITIES:  2+ Peripheral Pulses, No clubbing, cyanosis, or edema  LYMPH: No lymphadenopathy noted  SKIN: No rashes or lesions    LABS:                        12.2   5.12  )-----------( 135      ( 2018 06:24 )             39.8         143  |  106  |  29<H>  ----------------------------<  81  3.9   |  29  |  1.45<H>    Ca    8.8      2018 06:24  Mg     2.5         TPro  8.4<H>  /  Alb  3.7  /  TBili  0.6  /  DBili  x   /  AST  31  /  ALT  26  /  AlkPhos  72  11-27    PT/INR - ( 2018 10:20 )   PT: 11.6 sec;   INR: 1.03 ratio         PTT - ( 2018 10:20 )  PTT:35.1 sec  Urinalysis Basic - ( 2018 11:53 )    Color: Yellow / Appearance: Clear / S.020 / pH: x  Gluc: x / Ketone: Negative  / Bili: Negative / Urobili: Negative mg/dL   Blood: x / Protein: 30 mg/dL / Nitrite: Negative   Leuk Esterase: Negative / RBC: 0-2 /HPF / WBC 0-2   Sq Epi: x / Non Sq Epi: Occasional / Bacteria: Occasional      CAPILLARY BLOOD GLUCOSE          RADIOLOGY & ADDITIONAL TESTS:    Imaging Personally Reviewed:  [ ] YES  [ ] NO    Consultant(s) Notes Reviewed:  [ ] YES  [ ] NO    Care Discussed with Consultants/Other Providers [ ] YES  [ ] NO Patient is a 89y old  Male who presents with a chief complaint of fall (2018 11:14)      INTERVAL HPI/OVERNIGHT EVENTS: no aCUTE EVENTS WANTS TO GO HOME     MEDICATIONS  (STANDING):  atorvastatin 40 milliGRAM(s) Oral at bedtime  bicalutamide 50 milliGRAM(s) Oral daily  docusate sodium 100 milliGRAM(s) Oral two times a day  enoxaparin Injectable 30 milliGRAM(s) SubCutaneous every 24 hours  ferrous    sulfate 325 milliGRAM(s) Oral daily  furosemide    Tablet 20 milliGRAM(s) Oral daily  spironolactone 25 milliGRAM(s) Oral daily    MEDICATIONS  (PRN):  acetaminophen   Tablet .. 650 milliGRAM(s) Oral every 6 hours PRN Mild Pain (1 - 3)  polyethylene glycol 3350 17 Gram(s) Oral daily PRN Constipation      Allergies    No Known Allergies    Intolerances        REVIEW OF SYSTEMS:  CONSTITUTIONAL: No fever, weight loss, or fatigue  EYES: No eye pain, visual disturbances, or discharge  ENMT:  No difficulty hearing, tinnitus, vertigo; No sinus or throat pain  NECK: No pain or stiffness  BREASTS: No pain, masses, or nipple discharge  RESPIRATORY: No cough, wheezing, chills or hemoptysis; No shortness of breath  CARDIOVASCULAR: No chest pain, palpitations, dizziness, or leg swelling  GASTROINTESTINAL: No abdominal or epigastric pain. No nausea, vomiting, or hematemesis; No diarrhea or constipation. No melena or hematochezia.  GENITOURINARY: No dysuria, frequency, hematuria, or incontinence  NEUROLOGICAL: No headaches, memory loss, loss of strength, numbness, or tremors  SKIN: No itching, burning, rashes, or lesions   LYMPH NODES: No enlarged glands  ENDOCRINE: No heat or cold intolerance; No hair loss  MUSCULOSKELETAL: No joint pain or swelling; No muscle, back, or extremity pain  PSYCHIATRIC: No depression, anxiety, mood swings, or difficulty sleeping  HEME/LYMPH: No easy bruising, or bleeding gums  ALLERGY AND IMMUNOLOGIC: No hives or eczema    Vital Signs Last 24 Hrs  T(C): 36.5 (2018 12:05), Max: 36.7 (2018 23:18)  T(F): 97.7 (2018 12:05), Max: 98 (2018 23:18)  HR: 61 (2018 17:31) (61 - 69)  BP: 118/71 (2018 17:31) (118/71 - 129/75)  BP(mean): --  RR: 18 (2018 17:31) (17 - 18)  SpO2: 98% (2018 17:31) (95% - 100%)    PHYSICAL EXAM:  GENERAL: NAD, well-groomed, well-developed  HEAD:  Atraumatic, Normocephalic  EYES: EOMI, PERRLA, conjunctiva and sclera clear  ENMT: No tonsillar erythema, exudates, or enlargement; Moist mucous membranes, Good dentition, No lesions  NECK: Supple, No JVD, Normal thyroid  NERVOUS SYSTEM:  Alert & Oriented X3, Good concentration; Motor Strength 5/5 B/L upper and lower extremities; DTRs 2+ intact and symmetric  CHEST/LUNG: Clear to percussion bilaterally; No rales, rhonchi, wheezing, or rubs  HEART: Regular rate and rhythm; No murmurs, rubs, or gallops  ABDOMEN: Soft, Nontender, Nondistended; Bowel sounds present  EXTREMITIES:  2+ Peripheral Pulses, No clubbing, cyanosis, or edema  LYMPH: No lymphadenopathy noted  SKIN: No rashes or lesions    LABS:                        12.2   5.12  )-----------( 135      ( 2018 06:24 )             39.8         143  |  106  |  29<H>  ----------------------------<  81  3.9   |  29  |  1.45<H>    Ca    8.8      2018 06:24  Mg     2.5         TPro  8.4<H>  /  Alb  3.7  /  TBili  0.6  /  DBili  x   /  AST  31  /  ALT  26  /  AlkPhos  72  11-    PT/INR - ( 2018 10:20 )   PT: 11.6 sec;   INR: 1.03 ratio         PTT - ( 2018 10:20 )  PTT:35.1 sec  Urinalysis Basic - ( 2018 11:53 )    Color: Yellow / Appearance: Clear / S.020 / pH: x  Gluc: x / Ketone: Negative  / Bili: Negative / Urobili: Negative mg/dL   Blood: x / Protein: 30 mg/dL / Nitrite: Negative   Leuk Esterase: Negative / RBC: 0-2 /HPF / WBC 0-2   Sq Epi: x / Non Sq Epi: Occasional / Bacteria: Occasional      CAPILLARY BLOOD GLUCOSE          RADIOLOGY & ADDITIONAL TESTS:    Imaging Personally Reviewed:  [ ] YES  [ ] NO    Consultant(s) Notes Reviewed:  [ ] YES  [ ] NO    Care Discussed with Consultants/Other Providers [ ] YES  [ ] NO

## 2018-11-28 NOTE — DIETITIAN INITIAL EVALUATION ADULT. - ETIOLOGY
increased nutrient needs r/t to advanced age & COPD Inadequate energy/protein needs r/t to advanced age & CHF

## 2018-11-28 NOTE — DIETITIAN INITIAL EVALUATION ADULT. - OTHER INFO
Pt seen for CHF consult. Pt lives at home c son & has HHA 9hrs/day. Son does the grocery shopping & HHA does the cooking. Denies N/V/D/C or chewing/swallowing issues at this time. Observed pt consume 100% lunch today; pt reports consuming 100% all meals during hospitalization. Pt does monitor daily wts. Reports # last time he went to the dr. but unsure when that was. Pt seen for CHF consult. Pt lives at home c son & has HHA 9hrs/day. Son does the grocery shopping & HHA does the cooking. Denies N/V/D/C or chewing/swallowing issues at this time. Observed pt consume 100% lunch today; pt reports consuming 100% all meals during hospitalization. Pt reports # last time he went to the dr. but unsure when that was. Pt seen for CHF consult. Pt lives at home c son & has HHA 9hrs/day. Son does the grocery shopping & HHA does the cooking. Denies N/V/D/C or chewing/swallowing issues at this time. Observed pt consume 100% lunch today; pt reports consuming 100% all meals during hospitalization. # last time he went to the dr. but unsure when that was. Per previous admission info wt loss as noted.

## 2018-11-28 NOTE — DIETITIAN INITIAL EVALUATION ADULT. - SIGNS/SYMPTOMS
severe subcutaneous fat loss & severe muscle wasting Physical signs of severe subcutaneous fat loss & severe muscle wasting; 9% wt loss x 9 months

## 2018-11-28 NOTE — DIETITIAN INITIAL EVALUATION ADULT. - NUTRITIONGOAL OUTCOME1
pt to consume >75% food/supplement; promote gradual wt gain .5/1# during hospitalization pt to consume >75% food/supplement; promote gradual wt gain 0.5/1# during hospitalization

## 2018-11-29 ENCOUNTER — TRANSCRIPTION ENCOUNTER (OUTPATIENT)
Age: 83
End: 2018-11-29

## 2018-11-29 VITALS
HEART RATE: 78 BPM | TEMPERATURE: 98 F | SYSTOLIC BLOOD PRESSURE: 103 MMHG | OXYGEN SATURATION: 100 % | DIASTOLIC BLOOD PRESSURE: 56 MMHG | RESPIRATION RATE: 18 BRPM

## 2018-11-29 LAB
ANION GAP SERPL CALC-SCNC: 9 MMOL/L — SIGNIFICANT CHANGE UP (ref 5–17)
BUN SERPL-MCNC: 24 MG/DL — HIGH (ref 7–23)
CALCIUM SERPL-MCNC: 8.7 MG/DL — SIGNIFICANT CHANGE UP (ref 8.5–10.1)
CHLORIDE SERPL-SCNC: 106 MMOL/L — SIGNIFICANT CHANGE UP (ref 96–108)
CO2 SERPL-SCNC: 26 MMOL/L — SIGNIFICANT CHANGE UP (ref 22–31)
CREAT SERPL-MCNC: 1.32 MG/DL — HIGH (ref 0.5–1.3)
GLUCOSE SERPL-MCNC: 81 MG/DL — SIGNIFICANT CHANGE UP (ref 70–99)
HCT VFR BLD CALC: 39.2 % — SIGNIFICANT CHANGE UP (ref 39–50)
HGB BLD-MCNC: 12.3 G/DL — LOW (ref 13–17)
MCHC RBC-ENTMCNC: 28 PG — SIGNIFICANT CHANGE UP (ref 27–34)
MCHC RBC-ENTMCNC: 31.4 GM/DL — LOW (ref 32–36)
MCV RBC AUTO: 89.1 FL — SIGNIFICANT CHANGE UP (ref 80–100)
NRBC # BLD: 0 /100 WBCS — SIGNIFICANT CHANGE UP (ref 0–0)
PLATELET # BLD AUTO: 146 K/UL — LOW (ref 150–400)
POTASSIUM SERPL-MCNC: 3.8 MMOL/L — SIGNIFICANT CHANGE UP (ref 3.5–5.3)
POTASSIUM SERPL-SCNC: 3.8 MMOL/L — SIGNIFICANT CHANGE UP (ref 3.5–5.3)
RBC # BLD: 4.4 M/UL — SIGNIFICANT CHANGE UP (ref 4.2–5.8)
RBC # FLD: 14.7 % — HIGH (ref 10.3–14.5)
SODIUM SERPL-SCNC: 141 MMOL/L — SIGNIFICANT CHANGE UP (ref 135–145)
WBC # BLD: 5.24 K/UL — SIGNIFICANT CHANGE UP (ref 3.8–10.5)
WBC # FLD AUTO: 5.24 K/UL — SIGNIFICANT CHANGE UP (ref 3.8–10.5)

## 2018-11-29 PROCEDURE — 99239 HOSP IP/OBS DSCHRG MGMT >30: CPT

## 2018-11-29 RX ORDER — FUROSEMIDE 40 MG
1 TABLET ORAL
Qty: 30 | Refills: 0
Start: 2018-11-29 | End: 2018-12-28

## 2018-11-29 RX ADMIN — ENOXAPARIN SODIUM 30 MILLIGRAM(S): 100 INJECTION SUBCUTANEOUS at 11:13

## 2018-11-29 RX ADMIN — Medication 100 MILLIGRAM(S): at 05:30

## 2018-11-29 RX ADMIN — SPIRONOLACTONE 25 MILLIGRAM(S): 25 TABLET, FILM COATED ORAL at 05:30

## 2018-11-29 RX ADMIN — Medication 20 MILLIGRAM(S): at 05:30

## 2018-11-29 RX ADMIN — BICALUTAMIDE 50 MILLIGRAM(S): 50 TABLET, FILM COATED ORAL at 11:12

## 2018-11-29 RX ADMIN — Medication 325 MILLIGRAM(S): at 11:12

## 2018-11-29 NOTE — DISCHARGE NOTE ADULT - HOSPITAL COURSE
History of Present Illness:  Reason for Admission: fall	  History of Present Illness: 	   89 years old male by ems with his aide c/o pt has been falling due to dizziness and sob on exertion and bilateral legs swelling for three days. Pt sts he was dizziness and fell at 6:00 am in his bed room and he could not get up from the floor. Pt is alert and oriented x 3 denies trauma to the head, loc, headache, neck/back/hips pain, blurred visions, light sensitivities, focal/distal weakness or numbness, cough, chest pain, nausea, vomiting, fever, chills, abd pain, dysuria or irregular bowel movements.    Patient was observed fin hospital although rehab was recommend by  PT plan was to send home with home PT   Duplex venous was negative   ct ahead no acute changes   long discussion with son including emails was done he would rather have his father at home   Patient to follow up with PMD Dr Nic Adams   [phone]

## 2018-11-29 NOTE — DISCHARGE NOTE ADULT - SECONDARY DIAGNOSIS.
Chronic systolic congestive heart failure Essential hypertension Prostate CA Iron deficiency anemia secondary to inadequate dietary iron intake

## 2018-11-29 NOTE — DISCHARGE NOTE ADULT - PLAN OF CARE
For physical therapy @ home physical therapy Stable  Continue medications as prescribed Continue medications as prescribed  Follow up with PMD  Low-sodium diet  AHA Recipes - [email or web address] History of  Follow up with urologist Continue iron supplementation

## 2018-11-29 NOTE — DISCHARGE NOTE ADULT - CARE PLAN
Principal Discharge DX:	Falls frequently  Goal:	For physical therapy @ home  Assessment and plan of treatment:	physical therapy  Secondary Diagnosis:	Chronic systolic congestive heart failure  Assessment and plan of treatment:	Stable  Continue medications as prescribed  Secondary Diagnosis:	Essential hypertension  Assessment and plan of treatment:	Continue medications as prescribed  Follow up with PMD  Low-sodium diet  AHA Recipes - [email or web address]  Secondary Diagnosis:	Prostate CA  Assessment and plan of treatment:	History of  Follow up with urologist Principal Discharge DX:	Falls frequently  Goal:	For physical therapy @ home  Assessment and plan of treatment:	physical therapy  Secondary Diagnosis:	Chronic systolic congestive heart failure  Assessment and plan of treatment:	Stable  Continue medications as prescribed  Secondary Diagnosis:	Essential hypertension  Assessment and plan of treatment:	Continue medications as prescribed  Follow up with PMD  Low-sodium diet  AHA Recipes - [email or web address]  Secondary Diagnosis:	Prostate CA  Assessment and plan of treatment:	History of  Follow up with urologist  Secondary Diagnosis:	Iron deficiency anemia secondary to inadequate dietary iron intake  Assessment and plan of treatment:	Continue iron supplementation

## 2018-11-29 NOTE — DISCHARGE NOTE ADULT - PATIENT PORTAL LINK FT
You can access the [facility], offered by [facility], by registering with the following website: [email or web address]

## 2018-11-29 NOTE — PATIENT PROFILE ADULT - NSASFALLWHENOCCURRED_GEN_A_NUR
How Many Skin Cancers Have You Had?: one What Is The Reason For Today's Visit?: History of Non-Melanoma Skin Cancer When Was Your Last Cancer Diagnosed?: 2016 last six months

## 2018-11-29 NOTE — DISCHARGE NOTE ADULT - MEDICATION SUMMARY - MEDICATIONS TO TAKE
I will START or STAY ON the medications listed below when I get home from the hospital:    spironolactone 25 mg oral tablet  -- 1 tab(s) by mouth once a day  -- Indication: For Chronic diastolic heart failure    rosuvastatin 10 mg oral tablet  -- 1 tab(s) by mouth once a day (at bedtime)  -- Indication: For CHolesterorl     bicalutamide 50 mg oral tablet  -- 1 tab(s) by mouth once a day  -- Indication: For Prostate CA    Tessalon Perles 100 mg oral capsule  -- 1 cap(s) by mouth 3 times a day   -- May cause drowsiness.  Alcohol may intensify this effect.  Use care when operating dangerous machinery.  Swallow whole.  Do not crush.    -- Indication: For Cough    Lasix 40 mg oral tablet  -- 1 tab(s) by mouth once a day  -- Indication: For Chronic diastolic heart failure    ferrous sulfate 325 mg (65 mg elemental iron) oral tablet  -- 1 tab(s) by mouth 3 times a day  -- Indication: For Iron defeciancy anemia     sucralfate 1 g/10 mL oral suspension  -- 10 milliliter(s) by mouth 4 times a day  -- Indication: For Stomach     pantoprazole 40 mg intravenous injection  -- 40 milligram(s) intravenous every 12 hours  -- Indication: For GERD

## 2018-12-03 DIAGNOSIS — I50.22 CHRONIC SYSTOLIC (CONGESTIVE) HEART FAILURE: ICD-10-CM

## 2018-12-03 DIAGNOSIS — R29.6 REPEATED FALLS: ICD-10-CM

## 2018-12-03 DIAGNOSIS — R42 DIZZINESS AND GIDDINESS: ICD-10-CM

## 2018-12-03 DIAGNOSIS — R26.81 UNSTEADINESS ON FEET: ICD-10-CM

## 2018-12-03 DIAGNOSIS — I11.0 HYPERTENSIVE HEART DISEASE WITH HEART FAILURE: ICD-10-CM

## 2018-12-03 DIAGNOSIS — K21.9 GASTRO-ESOPHAGEAL REFLUX DISEASE WITHOUT ESOPHAGITIS: ICD-10-CM

## 2018-12-03 DIAGNOSIS — Z85.46 PERSONAL HISTORY OF MALIGNANT NEOPLASM OF PROSTATE: ICD-10-CM

## 2018-12-03 DIAGNOSIS — D50.9 IRON DEFICIENCY ANEMIA, UNSPECIFIED: ICD-10-CM

## 2019-02-06 NOTE — PHYSICAL THERAPY INITIAL EVALUATION ADULT - PLANNED THERAPY INTERVENTIONS, PT EVAL
bed mobility training/transfer training/postural re-education/balance training/gait training/strengthening Previous Accession (Optional): [id number] Previous Accession (Optional): [id number]

## 2019-03-18 NOTE — PROGRESS NOTE ADULT - PROBLEM SELECTOR PLAN 1
18-Mar-2019 13:06
18-Mar-2019 14:39
CBC  Iron.
No signs of active bleeding. Patient again refused GI w/u. Also, discussed with son Eren Lemus [phone] for 20 minutes. He firmly agrees with his father at the present time but will discuss with family. My name / number given to son. Will advance diet.
No signs of active bleeding. Patient again refused GI w/u. Also, discussed with son Eren Lemus [phone] for 20 minutes. He firmly agrees with his father at the present time but will discuss with family. Patient still refusing work up. My name / number given to patient. Patient tolerating regular diet. Stable from GI point of view for discharge.

## 2019-04-23 PROBLEM — I50.9 HEART FAILURE, UNSPECIFIED: Chronic | Status: ACTIVE | Noted: 2018-11-27

## 2019-04-23 PROBLEM — Z00.00 ENCOUNTER FOR PREVENTIVE HEALTH EXAMINATION: Status: ACTIVE | Noted: 2019-04-23

## 2019-04-23 PROBLEM — C61 MALIGNANT NEOPLASM OF PROSTATE: Chronic | Status: ACTIVE | Noted: 2018-11-27

## 2019-04-25 ENCOUNTER — EMERGENCY (EMERGENCY)
Facility: HOSPITAL | Age: 84
LOS: 0 days | Discharge: HOME | End: 2019-04-25
Attending: EMERGENCY MEDICINE
Payer: COMMERCIAL

## 2019-04-25 VITALS
RESPIRATION RATE: 17 BRPM | SYSTOLIC BLOOD PRESSURE: 168 MMHG | TEMPERATURE: 98 F | OXYGEN SATURATION: 97 % | HEART RATE: 76 BPM | DIASTOLIC BLOOD PRESSURE: 90 MMHG

## 2019-04-25 VITALS
SYSTOLIC BLOOD PRESSURE: 164 MMHG | TEMPERATURE: 98 F | RESPIRATION RATE: 14 BRPM | WEIGHT: 139.99 LBS | DIASTOLIC BLOOD PRESSURE: 96 MMHG | OXYGEN SATURATION: 98 % | HEART RATE: 66 BPM

## 2019-04-25 DIAGNOSIS — R60.9 EDEMA, UNSPECIFIED: ICD-10-CM

## 2019-04-25 DIAGNOSIS — I50.9 HEART FAILURE, UNSPECIFIED: ICD-10-CM

## 2019-04-25 DIAGNOSIS — I10 ESSENTIAL (PRIMARY) HYPERTENSION: ICD-10-CM

## 2019-04-25 DIAGNOSIS — Z98.89 OTHER SPECIFIED POSTPROCEDURAL STATES: Chronic | ICD-10-CM

## 2019-04-25 DIAGNOSIS — Z85.46 PERSONAL HISTORY OF MALIGNANT NEOPLASM OF PROSTATE: ICD-10-CM

## 2019-04-25 LAB
ALBUMIN SERPL ELPH-MCNC: 3.8 G/DL — SIGNIFICANT CHANGE UP (ref 3.3–5)
ALP SERPL-CCNC: 91 U/L — SIGNIFICANT CHANGE UP (ref 40–120)
ALT FLD-CCNC: 23 U/L — SIGNIFICANT CHANGE UP (ref 12–78)
ANION GAP SERPL CALC-SCNC: 9 MMOL/L — SIGNIFICANT CHANGE UP (ref 5–17)
AST SERPL-CCNC: 35 U/L — SIGNIFICANT CHANGE UP (ref 15–37)
BASOPHILS # BLD AUTO: 0.03 K/UL — SIGNIFICANT CHANGE UP (ref 0–0.2)
BASOPHILS NFR BLD AUTO: 0.5 % — SIGNIFICANT CHANGE UP (ref 0–2)
BILIRUB SERPL-MCNC: 0.5 MG/DL — SIGNIFICANT CHANGE UP (ref 0.2–1.2)
BUN SERPL-MCNC: 26 MG/DL — HIGH (ref 7–23)
CALCIUM SERPL-MCNC: 9.8 MG/DL — SIGNIFICANT CHANGE UP (ref 8.5–10.1)
CHLORIDE SERPL-SCNC: 107 MMOL/L — SIGNIFICANT CHANGE UP (ref 96–108)
CO2 SERPL-SCNC: 26 MMOL/L — SIGNIFICANT CHANGE UP (ref 22–31)
CREAT SERPL-MCNC: 1.37 MG/DL — HIGH (ref 0.5–1.3)
EOSINOPHIL # BLD AUTO: 0.11 K/UL — SIGNIFICANT CHANGE UP (ref 0–0.5)
EOSINOPHIL NFR BLD AUTO: 1.7 % — SIGNIFICANT CHANGE UP (ref 0–6)
GLUCOSE SERPL-MCNC: 80 MG/DL — SIGNIFICANT CHANGE UP (ref 70–99)
HCT VFR BLD CALC: 45 % — SIGNIFICANT CHANGE UP (ref 39–50)
HGB BLD-MCNC: 13.8 G/DL — SIGNIFICANT CHANGE UP (ref 13–17)
IMM GRANULOCYTES NFR BLD AUTO: 0.3 % — SIGNIFICANT CHANGE UP (ref 0–1.5)
LYMPHOCYTES # BLD AUTO: 1.48 K/UL — SIGNIFICANT CHANGE UP (ref 1–3.3)
LYMPHOCYTES # BLD AUTO: 23.3 % — SIGNIFICANT CHANGE UP (ref 13–44)
MCHC RBC-ENTMCNC: 27.5 PG — SIGNIFICANT CHANGE UP (ref 27–34)
MCHC RBC-ENTMCNC: 30.7 GM/DL — LOW (ref 32–36)
MCV RBC AUTO: 89.8 FL — SIGNIFICANT CHANGE UP (ref 80–100)
MONOCYTES # BLD AUTO: 0.72 K/UL — SIGNIFICANT CHANGE UP (ref 0–0.9)
MONOCYTES NFR BLD AUTO: 11.3 % — SIGNIFICANT CHANGE UP (ref 2–14)
NEUTROPHILS # BLD AUTO: 3.99 K/UL — SIGNIFICANT CHANGE UP (ref 1.8–7.4)
NEUTROPHILS NFR BLD AUTO: 62.9 % — SIGNIFICANT CHANGE UP (ref 43–77)
NRBC # BLD: 0 /100 WBCS — SIGNIFICANT CHANGE UP (ref 0–0)
PLATELET # BLD AUTO: 186 K/UL — SIGNIFICANT CHANGE UP (ref 150–400)
POTASSIUM SERPL-MCNC: 4.4 MMOL/L — SIGNIFICANT CHANGE UP (ref 3.5–5.3)
POTASSIUM SERPL-SCNC: 4.4 MMOL/L — SIGNIFICANT CHANGE UP (ref 3.5–5.3)
PROT SERPL-MCNC: 8.7 GM/DL — HIGH (ref 6–8.3)
RBC # BLD: 5.01 M/UL — SIGNIFICANT CHANGE UP (ref 4.2–5.8)
RBC # FLD: 15 % — HIGH (ref 10.3–14.5)
SODIUM SERPL-SCNC: 142 MMOL/L — SIGNIFICANT CHANGE UP (ref 135–145)
WBC # BLD: 6.35 K/UL — SIGNIFICANT CHANGE UP (ref 3.8–10.5)
WBC # FLD AUTO: 6.35 K/UL — SIGNIFICANT CHANGE UP (ref 3.8–10.5)

## 2019-04-25 PROCEDURE — 99284 EMERGENCY DEPT VISIT MOD MDM: CPT

## 2019-04-25 PROCEDURE — 93010 ELECTROCARDIOGRAM REPORT: CPT

## 2019-04-25 PROCEDURE — 73030 X-RAY EXAM OF SHOULDER: CPT | Mod: 26,RT

## 2019-04-25 NOTE — ED PROVIDER NOTE - PHYSICAL EXAMINATION
Guo:  General: No distress.  Mentation at baseline.   HEENT: WNL  Chest/Lungs: CTAB, No wheeze, No retractions, No increased work of breathing, Normal rate  Heart: S1S2 RRR, No M/R/G, Pules equal Bilaterally in upper and lower extremities distally  Abd: soft, NT/ND, No guarding, No rebound.  No hernias, no palpable masses.  Extrem: FROM in all joints, 3+ edema noted No ulcers.  Cap refil < 2sec.  Skin: No rash noted, warm dry.  Neuro:  Grossly normal.  No difficulty ambulating. No focal deficits.  Psychiatric: No evidence of delusions. No SI/HI.

## 2019-04-25 NOTE — ED ADULT TRIAGE NOTE - CHIEF COMPLAINT QUOTE
pt BIBA with c/o pedal edema worsening over the last week, known CHF hx compliant with medication regimen, BS in the field 58 asymptomatic no known DN hx FS ordered

## 2019-04-25 NOTE — ED ADULT NURSE NOTE - NSIMPLEMENTINTERV_GEN_ALL_ED
Implemented All Fall with Harm Risk Interventions:  [place] to call system. Call bell, personal items and telephone within reach. Instruct patient to call for assistance. Room bathroom lighting operational. Non-slip footwear when patient is off stretcher. Physically safe environment: no spills, clutter or unnecessary equipment. Stretcher in lowest position, wheels locked, appropriate side rails in place. Provide visual cue, wrist band, yellow gown, etc. Monitor gait and stability. Monitor for mental status changes and reorient to person, place, and time. Review medications for side effects contributing to fall risk. Reinforce activity limits and safety measures with patient and family. Provide visual clues: red socks.

## 2019-04-25 NOTE — ED ADULT NURSE NOTE - OBJECTIVE STATEMENT
patient A&Ox2 to name and  , patient denied any chest pain or back pain at this  time , c/o of weakness patient c/o of lower extremities edema , mild wheezing note Md aware , patient FS 48 , 77 and 77 Md aware as per Md feed patient at this time , patient pass the dysphagia screen , place on cardiac monitor

## 2019-04-25 NOTE — ED ADULT NURSE REASSESSMENT NOTE - NS ED NURSE REASSESS COMMENT FT1
patient A&Ox3 in no acute distress no pain or disc at this time , discharge as orders heplock remove left ER with Marsha aid

## 2019-05-09 ENCOUNTER — APPOINTMENT (OUTPATIENT)
Dept: VASCULAR SURGERY | Facility: CLINIC | Age: 84
End: 2019-05-09
Payer: COMMERCIAL

## 2019-05-09 VITALS
BODY MASS INDEX: 21.21 KG/M2 | DIASTOLIC BLOOD PRESSURE: 72 MMHG | SYSTOLIC BLOOD PRESSURE: 151 MMHG | TEMPERATURE: 97.6 F | HEART RATE: 54 BPM | HEIGHT: 66 IN | WEIGHT: 132 LBS

## 2019-05-09 VITALS — HEART RATE: 59 BPM | SYSTOLIC BLOOD PRESSURE: 161 MMHG | DIASTOLIC BLOOD PRESSURE: 87 MMHG

## 2019-05-09 DIAGNOSIS — I89.0 LYMPHEDEMA, NOT ELSEWHERE CLASSIFIED: ICD-10-CM

## 2019-05-09 PROCEDURE — 99203 OFFICE O/P NEW LOW 30 MIN: CPT

## 2019-09-02 ENCOUNTER — EMERGENCY (EMERGENCY)
Facility: HOSPITAL | Age: 84
LOS: 0 days | Discharge: HOME | End: 2019-09-02
Attending: EMERGENCY MEDICINE
Payer: MEDICARE

## 2019-09-02 VITALS
RESPIRATION RATE: 18 BRPM | TEMPERATURE: 98 F | OXYGEN SATURATION: 98 % | SYSTOLIC BLOOD PRESSURE: 135 MMHG | DIASTOLIC BLOOD PRESSURE: 68 MMHG | HEART RATE: 72 BPM

## 2019-09-02 VITALS
SYSTOLIC BLOOD PRESSURE: 135 MMHG | TEMPERATURE: 99 F | WEIGHT: 149.91 LBS | HEART RATE: 75 BPM | HEIGHT: 66 IN | RESPIRATION RATE: 19 BRPM | OXYGEN SATURATION: 98 % | DIASTOLIC BLOOD PRESSURE: 60 MMHG

## 2019-09-02 DIAGNOSIS — I11.0 HYPERTENSIVE HEART DISEASE WITH HEART FAILURE: ICD-10-CM

## 2019-09-02 DIAGNOSIS — I50.9 HEART FAILURE, UNSPECIFIED: ICD-10-CM

## 2019-09-02 DIAGNOSIS — Z98.89 OTHER SPECIFIED POSTPROCEDURAL STATES: Chronic | ICD-10-CM

## 2019-09-02 DIAGNOSIS — Z85.46 PERSONAL HISTORY OF MALIGNANT NEOPLASM OF PROSTATE: ICD-10-CM

## 2019-09-02 DIAGNOSIS — S00.81XA ABRASION OF OTHER PART OF HEAD, INITIAL ENCOUNTER: ICD-10-CM
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
## 2019-09-02 DIAGNOSIS — S00.83XA CONTUSION OF OTHER PART OF HEAD, INITIAL ENCOUNTER: ICD-10-CM

## 2019-09-02 DIAGNOSIS — S00.03XA CONTUSION OF SCALP, INITIAL ENCOUNTER: ICD-10-CM
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
## 2019-09-02 LAB
GLUCOSE BLDC GLUCOMTR-MCNC: 77 MG/DL — SIGNIFICANT CHANGE UP (ref 70–99)
GLUCOSE BLDC GLUCOMTR-MCNC: 95 MG/DL — SIGNIFICANT CHANGE UP (ref 70–99)

## 2019-09-02 PROCEDURE — 99284 EMERGENCY DEPT VISIT MOD MDM: CPT

## 2019-09-02 PROCEDURE — 70450 CT HEAD/BRAIN W/O DYE: CPT | Mod: 26

## 2019-09-02 PROCEDURE — 70486 CT MAXILLOFACIAL W/O DYE: CPT | Mod: 26

## 2019-09-02 RX ORDER — TETANUS TOXOID, REDUCED DIPHTHERIA TOXOID AND ACELLULAR PERTUSSIS VACCINE, ADSORBED 5; 2.5; 8; 8; 2.5 [IU]/.5ML; [IU]/.5ML; UG/.5ML; UG/.5ML; UG/.5ML
0.5 SUSPENSION INTRAMUSCULAR ONCE
Refills: 0 | Status: COMPLETED | OUTPATIENT
Start: 2019-09-02 | End: 2019-09-02

## 2019-09-02 RX ADMIN — TETANUS TOXOID, REDUCED DIPHTHERIA TOXOID AND ACELLULAR PERTUSSIS VACCINE, ADSORBED 0.5 MILLILITER(S): 5; 2.5; 8; 8; 2.5 SUSPENSION INTRAMUSCULAR at 13:05

## 2019-09-02 NOTE — ED PROVIDER NOTE - ENMT, MLM
Airway patent, Nasal mucosa clear. Mouth with normal mucosa. Throat has no vesicles, no oropharyngeal exudates and uvula is midline. bruised to left fore head

## 2019-09-02 NOTE — ED PROVIDER NOTE - CONSTITUTIONAL, MLM
normal... Well appearing, well nourished, awake, alert, oriented to person, place, time/situation and in no apparent distress. Speaking in clear full sentences no nasal flaring no shoulders retractions no diaphoresis, appears very comfortable sitting up in the stretcher in a bright light room.

## 2019-09-02 NOTE — ED PROVIDER NOTE - EYES, MLM
Clear bilaterally, pupils equal, round and reactive to light. No photophobia bilaterally + extra ocular motors are intact bilaterally

## 2019-09-02 NOTE — ED ADULT NURSE NOTE - NSIMPLEMENTINTERV_GEN_ALL_ED
Implemented All Fall with Harm Risk Interventions:  [place] to call system. Call bell, personal items and telephone within reach. Instruct patient to call for assistance. Room bathroom lighting operational. Non-slip footwear when patient is off stretcher. Physically safe environment: no spills, clutter or unnecessary equipment. Stretcher in lowest position, wheels locked, appropriate side rails in place. Provide visual cue, wrist band, yellow gown, etc. Monitor gait and stability. Monitor for mental status changes and reorient to person, place, and time. Review medications for side effects contributing to fall risk. Reinforce activity limits and safety measures with patient and family. Provide visual clues: red socks.

## 2019-09-02 NOTE — ED ADULT TRIAGE NOTE - CHIEF COMPLAINT QUOTE
patient BIBA , c/o of upper lip swelling and pain , patient had an unwitnessed fall today unknown LOC , denied headache denied dizziness denied N/V at the time of triage, patient denied chest pain denied difficulty breathing

## 2019-09-02 NOTE — ED PROVIDER NOTE - MUSCULOSKELETAL NECK EXAM
No vertebral point tenderness no pain on movement/no deformity, pain or tenderness. no restriction of movement/supple/trachea midline

## 2019-09-02 NOTE — ED PROVIDER NOTE - CARE PLAN
Principal Discharge DX:	Contusion of scalp, initial encounter  Secondary Diagnosis:	Contusion of face, initial encounter

## 2019-09-02 NOTE — ED PROVIDER NOTE - PROGRESS NOTE DETAILS
Pt remains alert and oriented x 3 just gave his son Eren 's number [phone]. Pt denies headache, dizziness, blurred visions, light sensitivities, neck/back/hips pain, sob, chest pain, nausea, vomiting, abd pain. Pt is given and explained the ct reports and advised to follow up with his doctor and return if symptoms persist or worsen. Eren Patel pt's son is called and sts he is coming to pick pt.

## 2019-09-02 NOTE — ED PROVIDER NOTE - PATIENT PORTAL LINK FT
You can access the FollowMyHealth Patient Portal offered by [facility] by registering at the following website: [email or web address] By joining [employer] FollowMyHealth portal, you will also be able to view your health information using other applications (apps) compatible with our system.

## 2019-10-04 ENCOUNTER — APPOINTMENT (OUTPATIENT)
Dept: VASCULAR SURGERY | Facility: CLINIC | Age: 84
End: 2019-10-04
Payer: COMMERCIAL

## 2019-10-04 VITALS
HEART RATE: 69 BPM | TEMPERATURE: 98 F | DIASTOLIC BLOOD PRESSURE: 57 MMHG | HEIGHT: 66 IN | SYSTOLIC BLOOD PRESSURE: 99 MMHG

## 2019-10-04 DIAGNOSIS — R09.89 OTHER SPECIFIED SYMPTOMS AND SIGNS INVOLVING THE CIRCULATORY AND RESPIRATORY SYSTEMS: ICD-10-CM

## 2019-10-04 DIAGNOSIS — Z78.9 OTHER SPECIFIED HEALTH STATUS: ICD-10-CM

## 2019-10-04 PROCEDURE — 93970 EXTREMITY STUDY: CPT

## 2019-10-04 PROCEDURE — 99213 OFFICE O/P EST LOW 20 MIN: CPT

## 2019-10-04 RX ORDER — FUROSEMIDE 20 MG/1
20 TABLET ORAL
Refills: 0 | Status: ACTIVE | COMMUNITY

## 2019-10-04 RX ORDER — BICALUTAMIDE 50 MG/1
TABLET ORAL
Refills: 0 | Status: ACTIVE | COMMUNITY

## 2019-10-04 RX ORDER — SPIRONOLACTONE 25 MG/1
25 TABLET ORAL
Refills: 0 | Status: ACTIVE | COMMUNITY

## 2019-10-04 NOTE — HISTORY OF PRESENT ILLNESS
[FreeTextEntry1] : Patient is a 89-year-old male with past medical history significant for congestive heart failure, history of prostate carcinoma status post radiation, hypertension, chronic swelling of bilateral lower extremities being treated with compression stockings and elevation presenting to us for evaluation of significant swelling of lower extremities associated with left calf ulceration which was treated at the wound care center with good results. No complaint of chest pain or shortness of breath at presentation. No history of blood clots. No rest pain or claudication symptoms.

## 2019-10-04 NOTE — ASSESSMENT
[FreeTextEntry1] : Patient with bilateral lower extremity edema. No evidence of significant arterial insufficiency. No DVT. 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 Recommend  elevation and compression.

## 2019-10-04 NOTE — PHYSICAL EXAM
[Normal Breath Sounds] : Normal breath sounds [Normal Heart Sounds] : normal heart sounds [2+] : right 2+ [Ankle Swelling (On Exam)] : present [Ankle Swelling Bilaterally] : severe [Ankle Swelling On The Right] : mild [] : bilaterally [Oriented to Place] : oriented to place [JVD] : no jugular venous distention  [Varicose Veins Of Lower Extremities] : not present [Abdomen Masses] : No abdominal masses [Skin Ulcer] : no ulcer

## 2019-10-04 NOTE — CONSULT LETTER
[Dear  ___] : Dear  [unfilled], [Please see my note below.] : Please see my note below. [Consult Letter:] : I had the pleasure of evaluating your patient, [unfilled]. [Consult Closing:] : Thank you very much for allowing me to participate in the care of this patient.  If you have any questions, please do not hesitate to contact me. [Sincerely,] : Sincerely, [FreeTextEntry3] : Jeermy Cowart M.D., F.COLBY.S., R.P.V.I.\par  of Vascular Surgery\par Chief, Vascular Surgery at [facility]\par Chief, Endovascular Surgery at [facility]\par Medical Director of Endovascular Program\par [facility]\par

## 2019-10-24 ENCOUNTER — APPOINTMENT (OUTPATIENT)
Dept: UROLOGY | Facility: CLINIC | Age: 84
End: 2019-10-24

## 2020-07-02 NOTE — DISCHARGE NOTE ADULT - MEDICATION SUMMARY - MEDICATIONS TO CHANGE
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  DEVICE INTERROGATED IN THE [facility] OFFICE  BATTERY VOLTAGE ADEQUATE (9 5 YRS)  AP-1%, BVP-91% (TOTAL -84 8%+VSR PACE-6 1%)  ALL LEAD PARAMETERS WITHIN NORMAL LIMITS  40 VHR EPISODES- NSVT & SVT, MOST RECENT WAS 6 BEAT NSVT, AVG CL~310MS  PT ASYMPTOMATIC W/ ANY EPISODES  PT ON ASA 81MG & CARVEDILOL  [facility]  OPTIVOL INITIALIZING  NORMAL DEVICE FUNCTION  WOUND CHECK: INCISION CLEAN AND DRY WITH EDGES APPROXIMATED; WOUND CARE AND RESTRICTIONS REVIEWED WITH PATIENT   GV I will SWITCH the dose or number of times a day I take the medications listed below when I get home from the hospital:    furosemide 20 mg oral tablet  -- 1 tab(s) by mouth once a day

## 2021-04-12 ENCOUNTER — EMERGENCY (EMERGENCY)
Facility: HOSPITAL | Age: 86
LOS: 0 days | Discharge: HOME | End: 2021-04-13
Attending: EMERGENCY MEDICINE
Payer: COMMERCIAL

## 2021-04-12 VITALS
OXYGEN SATURATION: 94 % | DIASTOLIC BLOOD PRESSURE: 82 MMHG | HEIGHT: 66 IN | RESPIRATION RATE: 20 BRPM | HEART RATE: 108 BPM | SYSTOLIC BLOOD PRESSURE: 149 MMHG | WEIGHT: 89.95 LBS | TEMPERATURE: 98 F

## 2021-04-12 DIAGNOSIS — I11.0 HYPERTENSIVE HEART DISEASE WITH HEART FAILURE: ICD-10-CM

## 2021-04-12 DIAGNOSIS — R53.1 WEAKNESS: ICD-10-CM

## 2021-04-12 DIAGNOSIS — Z98.89 OTHER SPECIFIED POSTPROCEDURAL STATES: Chronic | ICD-10-CM

## 2021-04-12 DIAGNOSIS — I50.9 HEART FAILURE, UNSPECIFIED: ICD-10-CM

## 2021-04-12 DIAGNOSIS — Z85.46 PERSONAL HISTORY OF MALIGNANT NEOPLASM OF PROSTATE: ICD-10-CM

## 2021-04-12 LAB
ALBUMIN SERPL ELPH-MCNC: 3.5 G/DL — SIGNIFICANT CHANGE UP (ref 3.3–5)
ALP SERPL-CCNC: 76 U/L — SIGNIFICANT CHANGE UP (ref 40–120)
ALT FLD-CCNC: 27 U/L — SIGNIFICANT CHANGE UP (ref 12–78)
ANION GAP SERPL CALC-SCNC: 8 MMOL/L — SIGNIFICANT CHANGE UP (ref 5–17)
APPEARANCE UR: CLEAR — SIGNIFICANT CHANGE UP
APTT BLD: 36.9 SEC — HIGH (ref 27.5–35.5)
AST SERPL-CCNC: 28 U/L — SIGNIFICANT CHANGE UP (ref 15–37)
BASOPHILS # BLD AUTO: 0.02 K/UL — SIGNIFICANT CHANGE UP (ref 0–0.2)
BASOPHILS NFR BLD AUTO: 0.4 % — SIGNIFICANT CHANGE UP (ref 0–2)
BILIRUB SERPL-MCNC: 0.5 MG/DL — SIGNIFICANT CHANGE UP (ref 0.2–1.2)
BILIRUB UR-MCNC: NEGATIVE — SIGNIFICANT CHANGE UP
BUN SERPL-MCNC: 22 MG/DL — SIGNIFICANT CHANGE UP (ref 7–23)
CALCIUM SERPL-MCNC: 9 MG/DL — SIGNIFICANT CHANGE UP (ref 8.5–10.1)
CHLORIDE SERPL-SCNC: 103 MMOL/L — SIGNIFICANT CHANGE UP (ref 96–108)
CO2 SERPL-SCNC: 25 MMOL/L — SIGNIFICANT CHANGE UP (ref 22–31)
COLOR SPEC: YELLOW — SIGNIFICANT CHANGE UP
CREAT SERPL-MCNC: 1.34 MG/DL — HIGH (ref 0.5–1.3)
DIFF PNL FLD: NEGATIVE — SIGNIFICANT CHANGE UP
EOSINOPHIL # BLD AUTO: 0.07 K/UL — SIGNIFICANT CHANGE UP (ref 0–0.5)
EOSINOPHIL NFR BLD AUTO: 1.3 % — SIGNIFICANT CHANGE UP (ref 0–6)
GLUCOSE BLDC GLUCOMTR-MCNC: 96 MG/DL — SIGNIFICANT CHANGE UP (ref 70–99)
GLUCOSE SERPL-MCNC: 90 MG/DL — SIGNIFICANT CHANGE UP (ref 70–99)
GLUCOSE UR QL: NEGATIVE MG/DL — SIGNIFICANT CHANGE UP
HCT VFR BLD CALC: 42.7 % — SIGNIFICANT CHANGE UP (ref 39–50)
HGB BLD-MCNC: 13.7 G/DL — SIGNIFICANT CHANGE UP (ref 13–17)
IMM GRANULOCYTES NFR BLD AUTO: 0.2 % — SIGNIFICANT CHANGE UP (ref 0–1.5)
INR BLD: 1.05 RATIO — SIGNIFICANT CHANGE UP (ref 0.88–1.16)
KETONES UR-MCNC: NEGATIVE — SIGNIFICANT CHANGE UP
LEUKOCYTE ESTERASE UR-ACNC: NEGATIVE — SIGNIFICANT CHANGE UP
LYMPHOCYTES # BLD AUTO: 1.07 K/UL — SIGNIFICANT CHANGE UP (ref 1–3.3)
LYMPHOCYTES # BLD AUTO: 19.6 % — SIGNIFICANT CHANGE UP (ref 13–44)
MCHC RBC-ENTMCNC: 27.8 PG — SIGNIFICANT CHANGE UP (ref 27–34)
MCHC RBC-ENTMCNC: 32.1 GM/DL — SIGNIFICANT CHANGE UP (ref 32–36)
MCV RBC AUTO: 86.8 FL — SIGNIFICANT CHANGE UP (ref 80–100)
MONOCYTES # BLD AUTO: 0.5 K/UL — SIGNIFICANT CHANGE UP (ref 0–0.9)
MONOCYTES NFR BLD AUTO: 9.1 % — SIGNIFICANT CHANGE UP (ref 2–14)
NEUTROPHILS # BLD AUTO: 3.8 K/UL — SIGNIFICANT CHANGE UP (ref 1.8–7.4)
NEUTROPHILS NFR BLD AUTO: 69.4 % — SIGNIFICANT CHANGE UP (ref 43–77)
NITRITE UR-MCNC: NEGATIVE — SIGNIFICANT CHANGE UP
NRBC # BLD: 0 /100 WBCS — SIGNIFICANT CHANGE UP (ref 0–0)
NT-PROBNP SERPL-SCNC: 403 PG/ML — SIGNIFICANT CHANGE UP (ref 0–450)
PH UR: 6 — SIGNIFICANT CHANGE UP (ref 5–8)
PLATELET # BLD AUTO: 159 K/UL — SIGNIFICANT CHANGE UP (ref 150–400)
POTASSIUM SERPL-MCNC: 4.2 MMOL/L — SIGNIFICANT CHANGE UP (ref 3.5–5.3)
POTASSIUM SERPL-SCNC: 4.2 MMOL/L — SIGNIFICANT CHANGE UP (ref 3.5–5.3)
PROT SERPL-MCNC: 7.8 GM/DL — SIGNIFICANT CHANGE UP (ref 6–8.3)
PROT UR-MCNC: NEGATIVE MG/DL — SIGNIFICANT CHANGE UP
PROTHROM AB SERPL-ACNC: 12.2 SEC — SIGNIFICANT CHANGE UP (ref 10.6–13.6)
RBC # BLD: 4.92 M/UL — SIGNIFICANT CHANGE UP (ref 4.2–5.8)
RBC # FLD: 14.1 % — SIGNIFICANT CHANGE UP (ref 10.3–14.5)
SODIUM SERPL-SCNC: 136 MMOL/L — SIGNIFICANT CHANGE UP (ref 135–145)
SP GR SPEC: 1.01 — SIGNIFICANT CHANGE UP (ref 1.01–1.02)
TROPONIN I SERPL-MCNC: <.015 NG/ML — SIGNIFICANT CHANGE UP (ref 0.01–0.04)
UROBILINOGEN FLD QL: NEGATIVE MG/DL — SIGNIFICANT CHANGE UP
WBC # BLD: 5.47 K/UL — SIGNIFICANT CHANGE UP (ref 3.8–10.5)
WBC # FLD AUTO: 5.47 K/UL — SIGNIFICANT CHANGE UP (ref 3.8–10.5)

## 2021-04-12 PROCEDURE — 71045 X-RAY EXAM CHEST 1 VIEW: CPT | Mod: 26

## 2021-04-12 PROCEDURE — 70450 CT HEAD/BRAIN W/O DYE: CPT | Mod: 26,MA

## 2021-04-12 PROCEDURE — 93010 ELECTROCARDIOGRAM REPORT: CPT

## 2021-04-12 PROCEDURE — 99285 EMERGENCY DEPT VISIT HI MDM: CPT

## 2021-04-12 RX ORDER — SODIUM CHLORIDE 9 MG/ML
1000 INJECTION INTRAMUSCULAR; INTRAVENOUS; SUBCUTANEOUS ONCE
Refills: 0 | Status: COMPLETED | OUTPATIENT
Start: 2021-04-12 | End: 2021-04-12

## 2021-04-12 RX ADMIN — SODIUM CHLORIDE 500 MILLILITER(S): 9 INJECTION INTRAMUSCULAR; INTRAVENOUS; SUBCUTANEOUS at 13:08

## 2021-04-12 NOTE — ED ADULT NURSE REASSESSMENT NOTE - NS ED NURSE REASSESS COMMENT FT1
received report from MANOHAR Ferrer. pt on the bed alert and oriented. pt for discharge waiting for  in the morning as per MANOHAR Ferrer. vital signs checked. pt not in distress. safety measures checked. received report from MANOHAR Ferrer. pt on the bed alert and oriented. pt for discharge waiting for  by son in the morning as per MANOHAR Ferrer. vital signs checked. pt not in distress. safety measures checked.

## 2021-04-12 NOTE — ED PROVIDER NOTE - PATIENT PORTAL LINK FT
You can access the FollowMyHealth Patient Portal offered by [facility] by registering at the following website: [email or web address]. By joining [employer]’s FollowMyHealth portal, you will also be able to view your health information using other applications (apps) compatible with our system.

## 2021-04-12 NOTE — ED PROVIDER NOTE - NEUROLOGICAL, MLM
----- Message from Gabbi Charlton sent at 9/20/2017  2:27 PM CDT -----  Contact: self   Pt is in the hospital please call her room number  [phone] Thanks !  
Attempted to contact pt on cell and number given. Unable to reach pt. Busy signal received   
Alert and oriented, no focal deficits, no motor or sensory deficits. NIHSS 0 otherwise

## 2021-04-12 NOTE — ED ADULT NURSE NOTE - CAS EDN DISCHARGE INTERVENTIONS
normal... well appearing, well nourished, and in no apparent distress. IV discontinued, cath removed intact

## 2021-04-12 NOTE — ED PROVIDER NOTE - CROS ED ROS STATEMENT
Patient calling Pharmacy stated our office is not responding for the refill request, Gabapentin 400 MG capsule was faxed 3 X. Patient run out of medication. Pls.  Call in refill ASAP     Gabapentin 400 MG capsule    Uyen Le/Archie [phone] all other ROS negative except as per HPI

## 2021-04-12 NOTE — ED PROVIDER NOTE - CLINICAL SUMMARY MEDICAL DECISION MAKING FREE TEXT BOX
pt otherwise with weakness seen by PT - and as per recommendations after speaking to son - will send home -  aware and will aid in DC.

## 2021-04-12 NOTE — ED ADULT NURSE NOTE - OBJECTIVE STATEMENT
89 yo m present with a complaint of weakness.  Pt explains that he usually uses a wheel chair in the home.  Denies pain/discomfort fever or chills.

## 2021-04-12 NOTE — ED ADULT NURSE NOTE - NSIMPLEMENTINTERV_GEN_ALL_ED
Implemented All Fall with Harm Risk Interventions:  [place] to call system. Call bell, personal items and telephone within reach. Instruct patient to call for assistance. Room bathroom lighting operational. Non-slip footwear when patient is off stretcher. Physically safe environment: no spills, clutter or unnecessary equipment. Stretcher in lowest position, wheels locked, appropriate side rails in place. Provide visual cue, wrist band, yellow gown, etc. Monitor gait and stability. Monitor for mental status changes and reorient to person, place, and time. Review medications for side effects contributing to fall risk. Reinforce activity limits and safety measures with patient and family. Provide visual clues: red socks.

## 2021-04-12 NOTE — ED PROVIDER NOTE - OBJECTIVE STATEMENT
89 year old male with PMH of HTN, Prostate CA, HLD, otherwise presenting to ED Due to decreased strength noted by aids today, normally pt is a 2 person assist to walk but noted increased walking today and complaint of some dizziness. No CP no URI no fever/chills.

## 2021-04-12 NOTE — ED ADULT NURSE NOTE - ED STAT RN HANDOFF DETAILS
Oriented - self; Oriented - place; Oriented - time pt still waiting for family for . pt kept comfortable. vital stable. report given to MANOHAR Danielle.

## 2021-04-12 NOTE — PHYSICAL THERAPY INITIAL EVALUATION ADULT - MUSCLE TONE ASSESSMENT, REHAB EVAL
Schedule a follow up if symptoms return, but not before 9 weeks if for nails and calluses.  If there is a problem which is not routine (such as infection, injury or ulcer) you can be seen at anytime.      
bilateral LE swelling/mildly decreased tone

## 2021-04-12 NOTE — ED ADULT TRIAGE NOTE - CHIEF COMPLAINT QUOTE
pt biba from home c/o weakness to lower extremities per report HHA states that pt usually able to walk with 2 assists , wasn't able  to do that this morning. pt c/o feeling dizzy at triage

## 2021-04-12 NOTE — ED ADULT NURSE NOTE - NS TRANSFER PATIENT BELONGINGS
Pharmacy Vancomycin Initial Note  Date of Service 2020  Patient's  1963  57 year old, male    Indication: Post op ppx (crani)    Current estimated CrCl = CrCl cannot be calculated (Patient's most recent lab result is older than the maximum 10 days allowed.).    Creatinine for last 3 days  No results found for requested labs within last 72 hours.    Recent Vancomycin Level(s) for last 3 days  No results found for requested labs within last 72 hours.      Vancomycin IV Administrations (past 72 hours)                   vancomycin (VANCOCIN) 1000 mg in dextrose 5% 200 mL PREMIX (mg) 1,000 mg New Bag 20 0707                Nephrotoxins and other renal medications (From now, onward)    None          Contrast Orders - past 72 hours (72h ago, onward)    Start     Dose/Rate Route Frequency Ordered Stop    20 1315  gadobutrol (GADAVIST) injection 5 mL      5 mL Intravenous ONCE 20 1302 20 1315    20 0900  gadobutrol (GADAVIST) injection 5 mL      5 mL Intravenous ONCE 20 0845 20 0900                Plan:  1.  Give vancomycin 1250 mg IV q12h x2 doses to complete 24 hr post op vanco per consult.     Radha Castro, TemiD       Clothing

## 2021-04-13 VITALS
OXYGEN SATURATION: 98 % | TEMPERATURE: 98 F | HEART RATE: 74 BPM | SYSTOLIC BLOOD PRESSURE: 135 MMHG | DIASTOLIC BLOOD PRESSURE: 87 MMHG | RESPIRATION RATE: 20 BRPM

## 2021-04-13 LAB
CULTURE RESULTS: SIGNIFICANT CHANGE UP
SPECIMEN SOURCE: SIGNIFICANT CHANGE UP

## 2021-04-13 NOTE — ED ADULT NURSE REASSESSMENT NOTE - NS ED NURSE REASSESS COMMENT FT1
Received pt from outgoing Rn alert and oriented to person and placed, everardo chisholm stretcher breathing at ease d/c home pending son for , safety measure maintained will continue to monitor pt.

## 2021-06-15 NOTE — PHYSICAL THERAPY INITIAL EVALUATION ADULT - STANDING BALANCE: STATIC
Refill Approved    Rx renewed per Medication Renewal Policy. Medication was last renewed on 12/21/2020.  Last office visit was 02/24/2021 with PCP.    Nakita Hsu, [facility] Triage/Med Refill 3/11/2021     Requested Prescriptions   Pending Prescriptions Disp Refills     atorvastatin (LIPITOR) 40 MG tablet [Pharmacy Med Name: ATORVASTATIN 40MG ORAL TABLET] 90 tablet 0     Sig: TAKE 1 TABLET BY MOUTH AT BEDTIME       Statins Refill Protocol (Hmg CoA Reductase Inhibitors) Passed - 3/11/2021  1:10 PM        Passed - PCP or prescribing provider visit in past 12 months      Last office visit with prescriber/PCP: Visit date not found OR same dept: Visit date not found OR same specialty: Visit date not found  Last physical: Visit date not found Last MTM visit: Visit date not found   Next visit within 3 mo: Visit date not found  Next physical within 3 mo: Visit date not found  Prescriber OR PCP: Milo Ogden MD  Last diagnosis associated with med order: 1. Hyperlipidemia  - atorvastatin (LIPITOR) 40 MG tablet [Pharmacy Med Name: ATORVASTATIN 40MG ORAL TABLET]; TAKE 1 TABLET BY MOUTH AT BEDTIME  Dispense: 90 tablet; Refill: 0    If protocol passes may refill for 12 months if within 3 months of last provider visit (or a total of 15 months).                            
poor minus

## 2021-09-27 ENCOUNTER — EMERGENCY (EMERGENCY)
Facility: HOSPITAL | Age: 86
LOS: 1 days | Discharge: HOME | End: 2021-09-27
Attending: EMERGENCY MEDICINE
Payer: COMMERCIAL

## 2021-09-27 VITALS
DIASTOLIC BLOOD PRESSURE: 83 MMHG | OXYGEN SATURATION: 95 % | TEMPERATURE: 98 F | WEIGHT: 139.99 LBS | RESPIRATION RATE: 19 BRPM | SYSTOLIC BLOOD PRESSURE: 143 MMHG | HEART RATE: 90 BPM | HEIGHT: 66 IN

## 2021-09-27 DIAGNOSIS — Z98.89 OTHER SPECIFIED POSTPROCEDURAL STATES: Chronic | ICD-10-CM

## 2021-09-27 DIAGNOSIS — R55 SYNCOPE AND COLLAPSE: ICD-10-CM

## 2021-09-27 DIAGNOSIS — I11.0 HYPERTENSIVE HEART DISEASE WITH HEART FAILURE: ICD-10-CM

## 2021-09-27 DIAGNOSIS — Z85.46 PERSONAL HISTORY OF MALIGNANT NEOPLASM OF PROSTATE: ICD-10-CM

## 2021-09-27 DIAGNOSIS — I50.9 HEART FAILURE, UNSPECIFIED: ICD-10-CM

## 2021-09-27 LAB
ALBUMIN SERPL ELPH-MCNC: 3.2 G/DL — LOW (ref 3.3–5)
ALP SERPL-CCNC: 81 U/L — SIGNIFICANT CHANGE UP (ref 40–120)
ALT FLD-CCNC: 21 U/L — SIGNIFICANT CHANGE UP (ref 12–78)
ANION GAP SERPL CALC-SCNC: 4 MMOL/L — LOW (ref 5–17)
APPEARANCE UR: CLEAR — SIGNIFICANT CHANGE UP
AST SERPL-CCNC: 22 U/L — SIGNIFICANT CHANGE UP (ref 15–37)
BASOPHILS # BLD AUTO: 0.02 K/UL — SIGNIFICANT CHANGE UP (ref 0–0.2)
BASOPHILS NFR BLD AUTO: 0.4 % — SIGNIFICANT CHANGE UP (ref 0–2)
BILIRUB SERPL-MCNC: 0.6 MG/DL — SIGNIFICANT CHANGE UP (ref 0.2–1.2)
BILIRUB UR-MCNC: NEGATIVE — SIGNIFICANT CHANGE UP
BUN SERPL-MCNC: 16 MG/DL — SIGNIFICANT CHANGE UP (ref 7–23)
CALCIUM SERPL-MCNC: 8.8 MG/DL — SIGNIFICANT CHANGE UP (ref 8.5–10.1)
CHLORIDE SERPL-SCNC: 106 MMOL/L — SIGNIFICANT CHANGE UP (ref 96–108)
CO2 SERPL-SCNC: 27 MMOL/L — SIGNIFICANT CHANGE UP (ref 22–31)
COLOR SPEC: YELLOW — SIGNIFICANT CHANGE UP
CREAT SERPL-MCNC: 1.18 MG/DL — SIGNIFICANT CHANGE UP (ref 0.5–1.3)
DIFF PNL FLD: NEGATIVE — SIGNIFICANT CHANGE UP
EOSINOPHIL # BLD AUTO: 0.08 K/UL — SIGNIFICANT CHANGE UP (ref 0–0.5)
EOSINOPHIL NFR BLD AUTO: 1.7 % — SIGNIFICANT CHANGE UP (ref 0–6)
GLUCOSE SERPL-MCNC: 95 MG/DL — SIGNIFICANT CHANGE UP (ref 70–99)
GLUCOSE UR QL: NEGATIVE MG/DL — SIGNIFICANT CHANGE UP
HCT VFR BLD CALC: 43.1 % — SIGNIFICANT CHANGE UP (ref 39–50)
HGB BLD-MCNC: 13.9 G/DL — SIGNIFICANT CHANGE UP (ref 13–17)
IMM GRANULOCYTES NFR BLD AUTO: 0.2 % — SIGNIFICANT CHANGE UP (ref 0–1.5)
KETONES UR-MCNC: NEGATIVE — SIGNIFICANT CHANGE UP
LEUKOCYTE ESTERASE UR-ACNC: NEGATIVE — SIGNIFICANT CHANGE UP
LIDOCAIN IGE QN: 200 U/L — SIGNIFICANT CHANGE UP (ref 73–393)
LYMPHOCYTES # BLD AUTO: 0.94 K/UL — LOW (ref 1–3.3)
LYMPHOCYTES # BLD AUTO: 20.4 % — SIGNIFICANT CHANGE UP (ref 13–44)
MCHC RBC-ENTMCNC: 27.9 PG — SIGNIFICANT CHANGE UP (ref 27–34)
MCHC RBC-ENTMCNC: 32.3 GM/DL — SIGNIFICANT CHANGE UP (ref 32–36)
MCV RBC AUTO: 86.4 FL — SIGNIFICANT CHANGE UP (ref 80–100)
MONOCYTES # BLD AUTO: 0.36 K/UL — SIGNIFICANT CHANGE UP (ref 0–0.9)
MONOCYTES NFR BLD AUTO: 7.8 % — SIGNIFICANT CHANGE UP (ref 2–14)
NEUTROPHILS # BLD AUTO: 3.19 K/UL — SIGNIFICANT CHANGE UP (ref 1.8–7.4)
NEUTROPHILS NFR BLD AUTO: 69.5 % — SIGNIFICANT CHANGE UP (ref 43–77)
NITRITE UR-MCNC: NEGATIVE — SIGNIFICANT CHANGE UP
NRBC # BLD: 0 /100 WBCS — SIGNIFICANT CHANGE UP (ref 0–0)
NT-PROBNP SERPL-SCNC: 690 PG/ML — HIGH (ref 0–450)
PH UR: 7 — SIGNIFICANT CHANGE UP (ref 5–8)
PLATELET # BLD AUTO: 157 K/UL — SIGNIFICANT CHANGE UP (ref 150–400)
POTASSIUM SERPL-MCNC: 3.8 MMOL/L — SIGNIFICANT CHANGE UP (ref 3.5–5.3)
POTASSIUM SERPL-SCNC: 3.8 MMOL/L — SIGNIFICANT CHANGE UP (ref 3.5–5.3)
PROT SERPL-MCNC: 7.7 GM/DL — SIGNIFICANT CHANGE UP (ref 6–8.3)
PROT UR-MCNC: NEGATIVE MG/DL — SIGNIFICANT CHANGE UP
RBC # BLD: 4.99 M/UL — SIGNIFICANT CHANGE UP (ref 4.2–5.8)
RBC # FLD: 15.7 % — HIGH (ref 10.3–14.5)
SODIUM SERPL-SCNC: 137 MMOL/L — SIGNIFICANT CHANGE UP (ref 135–145)
SP GR SPEC: 1 — LOW (ref 1.01–1.02)
TROPONIN I SERPL-MCNC: <.015 NG/ML — SIGNIFICANT CHANGE UP (ref 0.01–0.04)
TROPONIN I SERPL-MCNC: <.015 NG/ML — SIGNIFICANT CHANGE UP (ref 0.01–0.04)
UROBILINOGEN FLD QL: NEGATIVE MG/DL — SIGNIFICANT CHANGE UP
WBC # BLD: 4.6 K/UL — SIGNIFICANT CHANGE UP (ref 3.8–10.5)
WBC # FLD AUTO: 4.6 K/UL — SIGNIFICANT CHANGE UP (ref 3.8–10.5)

## 2021-09-27 PROCEDURE — 71045 X-RAY EXAM CHEST 1 VIEW: CPT | Mod: 26

## 2021-09-27 PROCEDURE — 93010 ELECTROCARDIOGRAM REPORT: CPT

## 2021-09-27 PROCEDURE — 99285 EMERGENCY DEPT VISIT HI MDM: CPT

## 2021-09-27 NOTE — ED PROVIDER NOTE - CLINICAL SUMMARY MEDICAL DECISION MAKING FREE TEXT BOX
DDx: Syncope vs near syncope, no CP to suggest ACS, no abdominal pain to suggest dissection.  Plan: CBC, CMP, troponin, chest x-ray, EKG and reassess.

## 2021-09-27 NOTE — ED ADULT NURSE NOTE - NSIMPLEMENTINTERV_GEN_ALL_ED
Implemented All Fall with Harm Risk Interventions:  [place] to call system. Call bell, personal items and telephone within reach. Instruct patient to call for assistance. Room bathroom lighting operational. Non-slip footwear when patient is off stretcher. Physically safe environment: no spills, clutter or unnecessary equipment. Stretcher in lowest position, wheels locked, appropriate side rails in place. Provide visual cue, wrist band, yellow gown, etc. Monitor gait and stability. Monitor for mental status changes and reorient to person, place, and time. Review medications for side effects contributing to fall risk. Reinforce activity limits and safety measures with patient and family. Provide visual clues: red socks.

## 2021-09-27 NOTE — ED PROVIDER NOTE - EKG ADDITIONAL INFORMATION FREE TEXT
hr 62  normal sinus rhythm, no st elevations, no t wave inversions per my interpretation. QTC wnl, no heart block.

## 2021-09-27 NOTE — ED PROVIDER NOTE - PATIENT PORTAL LINK FT
You can access the FollowMyHealth Patient Portal offered by [facility] by registering at the following website: [email or web address]. By joining [employer]’s FollowMyHealth portal, you will also be able to view your health information using other applications (apps) compatible with our system.

## 2021-09-27 NOTE — ED PROVIDER NOTE - PROGRESS NOTE DETAILS
Patient has been asymptomatic in ED, feeling comfortable, offered admission, but he would like to go home. Pt ready for d/c, referred to cardiology for f/u.

## 2021-09-27 NOTE — ED PROVIDER NOTE - CARE PROVIDER_API CALL
Naresh Correa  CARDIOLOGY  [address] [facility], [address] [place] [address]  Phone: [phone]  Fax: [phone]  Follow Up Time: 1-3 Days

## 2021-09-27 NOTE — ED PROVIDER NOTE - OBJECTIVE STATEMENT
89 year old male w/PMH of HTN, CHF, prostate CA presents to the ED for near syncope. States he did not have much energy, was on the toilet and had a BM and became very tired. Denies full LOC. Pt dose endorse a chronic cough. Denies LOC or head-strike. Denies fever/chills, SOB, CP, back pain, abdominal pain or N/V/D. Pt did not have breakfast this morning. Reports this has never happened prior. States long-term tobacco use, stopped x10 years ago. Pt gets around in a wheelchair.

## 2021-09-28 VITALS
TEMPERATURE: 98 F | SYSTOLIC BLOOD PRESSURE: 142 MMHG | HEART RATE: 78 BPM | OXYGEN SATURATION: 97 % | RESPIRATION RATE: 16 BRPM | DIASTOLIC BLOOD PRESSURE: 75 MMHG

## 2021-12-07 NOTE — ED ADULT TRIAGE NOTE - PAIN: PRESENCE, MLM
SUBJECTIVE    This is a 79 year old female here today for follow up on HTN and other health issues.    1.  Chest pain  -has not been experiencing any chest pain, did not end up trying famotidine  -does not eat a lot but does still have an appetite    2. Chronic low back pain  -we had patient try meloxicam and this was not helping her low back pain  -we had her try tramadol and this did help, she is now on an extended release version that she is taking once per day and this helps  -she is not dizzy or lightheaded in the morning    3. Memory loss  -pt doing well, her family makes sure to see her every day  -goes out for at least one meal day  -has a digital photo frame to see all her family members  -she doesn't drive  -has not had any issues with leaving stove on, forgetting food in microwave  -feels comfortable and safe in her current apartment  -1 glass of wine a few times per week      4. HTN  -pt has not had any shortness of breath, no chest pain, no palpitations  -she does take irbesartan     5. Overactive bladder  -has been taking oxybutynin, she will sometimes still get pain when urinating, lasts only a few hours at a time    6. History of stroke  -pt does take her rosuvastatin  -FLP done 4-26-21, Tchol 137, HDL 60, LDL 46,       Outpatient Medications Marked as Taking for the 12/7/21 encounter (Office Visit) with Purvi Moore MD   Medication Sig Dispense Refill   • traMADol 100 MG extended-release capsule Take 1 capsule by mouth daily. 30 capsule 0   • rosuvastatin (CRESTOR) 20 MG tablet TAKE 1 TABLET BY MOUTH DAILY 90 tablet 3   • irbesartan (AVAPRO) 150 MG tablet TAKE 1 TABLET BY MOUTH DAILY 90 tablet 3   • oxybutynin (DITROPAN-XL) 5 MG 24 hr tablet Take 1 tablet by mouth daily. 90 tablet 3   • acetaminophen (TYLENOL) 500 MG tablet Take 1 tablet by mouth nightly.     • Biotin 1 MG Cap      • Cranberry 1000 MG Cap Take 4,200 mg by mouth.     • VITAMIN D, ERGOCALCIFEROL, PO Take 2,000 Int'l Units by  mouth.     • Probiotic Product (PROBIOTIC DAILY PO)      • Calcium Polycarbophil (FIBER-CAPS PO)      • Multiple Vitamins-Minerals (MULTIVITAMIN WOMEN 50+) Tab Take 1 tablet by mouth daily.      • aspirin 81 MG chewable tablet Chew 81 mg by mouth daily.          OBJECTIVE    Blood pressure 128/72, pulse 84, height 5' 1\" (1.549 m), weight 58 kg (127 lb 12.8 oz), SpO2 95 %.  Body mass index is 24.15 kg/m².  GENERAL:  Alert and oriented x 3, well groomed, not in any acute distress.  HEENT:  Head normocephalic, atraumatic.  Pupils equal, round, reactive to light, extraocular movements intact, oral and nasal mucosa pink and moist.  LUNGS:  Clear to auscultation bilaterally, chest movement is symmetric, patient is in no respiratory distress.  CARDIOVASCULAR:  Regular rate and rhythm, no murmurs, rubs or gallops.  EXTREMITIES:  Bilateral lower extremities warm, dry, no cyanosis, clubbing or edema.    ASSESSMENT/PLAN    This is a 79 year old female here today for follow up on HTN.    1.  Chest pain  -now resolved    2. Chronic low back pain  -improved with extended release tramadol, continue on this    3. Memory loss  -stable, continue with excellent family support and regular social interaction  -can return to neurology as needed (family would not be interested unless new medication that can help becomes available)    4. HTN  -well controlled, continue on irbesartan    5. Overactive bladder  -occasional bladder spasms, discussed decreasing caffeine  -citrus and alcohol can also irritate the bladder  -standing order placed for UA if symptoms last more than a day or so    6. History of stroke  -continue on rosuvastatin  -next FLP April 2022    EDUCATION:  Lesly Guillory and daughter were educated as to the above assessment and plan and did express their understanding and agreement.    RETURN:  Lesly Guillory is asked to return in 4 months for MWV and follow up on chronic conditions or sooner as needed.     denies pain/discomfort

## 2022-02-13 ENCOUNTER — INPATIENT (INPATIENT)
Facility: HOSPITAL | Age: 87
LOS: 14 days | Discharge: HOSPICE | End: 2022-02-28
Attending: INTERNAL MEDICINE | Admitting: INTERNAL MEDICINE
Payer: COMMERCIAL

## 2022-02-13 VITALS
SYSTOLIC BLOOD PRESSURE: 131 MMHG | WEIGHT: 89.95 LBS | RESPIRATION RATE: 24 BRPM | HEIGHT: 66 IN | HEART RATE: 94 BPM | TEMPERATURE: 98 F | DIASTOLIC BLOOD PRESSURE: 88 MMHG

## 2022-02-13 DIAGNOSIS — Z98.89 OTHER SPECIFIED POSTPROCEDURAL STATES: Chronic | ICD-10-CM

## 2022-02-13 LAB
ALBUMIN SERPL ELPH-MCNC: 2.1 G/DL — LOW (ref 3.3–5)
ALP SERPL-CCNC: 99 U/L — SIGNIFICANT CHANGE UP (ref 40–120)
ALT FLD-CCNC: 62 U/L — SIGNIFICANT CHANGE UP (ref 12–78)
ANION GAP SERPL CALC-SCNC: 7 MMOL/L — SIGNIFICANT CHANGE UP (ref 5–17)
APTT BLD: 37.7 SEC — HIGH (ref 27.5–35.5)
AST SERPL-CCNC: 124 U/L — HIGH (ref 15–37)
BASE EXCESS BLDA CALC-SCNC: 1.5 MMOL/L — SIGNIFICANT CHANGE UP (ref -2–3)
BASOPHILS # BLD AUTO: 0 K/UL — SIGNIFICANT CHANGE UP (ref 0–0.2)
BASOPHILS NFR BLD AUTO: 0 % — SIGNIFICANT CHANGE UP (ref 0–2)
BILIRUB SERPL-MCNC: 0.6 MG/DL — SIGNIFICANT CHANGE UP (ref 0.2–1.2)
BLOOD GAS COMMENTS: SIGNIFICANT CHANGE UP
BLOOD GAS SOURCE: SIGNIFICANT CHANGE UP
BUN SERPL-MCNC: 59 MG/DL — HIGH (ref 7–23)
BURR CELLS BLD QL SMEAR: PRESENT — SIGNIFICANT CHANGE UP
CALCIUM SERPL-MCNC: 10 MG/DL — SIGNIFICANT CHANGE UP (ref 8.5–10.1)
CHLORIDE SERPL-SCNC: 117 MMOL/L — HIGH (ref 96–108)
CK SERPL-CCNC: 678 U/L — HIGH (ref 26–308)
CO2 BLDA-SCNC: 26 MMOL/L — HIGH (ref 19–24)
CO2 SERPL-SCNC: 27 MMOL/L — SIGNIFICANT CHANGE UP (ref 22–31)
CREAT SERPL-MCNC: 2.01 MG/DL — HIGH (ref 0.5–1.3)
D DIMER BLD IA.RAPID-MCNC: 6004 NG/ML DDU — HIGH
EOSINOPHIL # BLD AUTO: 0 K/UL — SIGNIFICANT CHANGE UP (ref 0–0.5)
EOSINOPHIL NFR BLD AUTO: 0 % — SIGNIFICANT CHANGE UP (ref 0–6)
FLUAV AG NPH QL: SIGNIFICANT CHANGE UP
FLUBV AG NPH QL: SIGNIFICANT CHANGE UP
GLUCOSE SERPL-MCNC: 74 MG/DL — SIGNIFICANT CHANGE UP (ref 70–99)
HCO3 BLDA-SCNC: 25 MMOL/L — SIGNIFICANT CHANGE UP (ref 21–28)
HCT VFR BLD CALC: 44.4 % — SIGNIFICANT CHANGE UP (ref 39–50)
HGB BLD-MCNC: 13.5 G/DL — SIGNIFICANT CHANGE UP (ref 13–17)
HOROWITZ INDEX BLDA+IHG-RTO: 100 — SIGNIFICANT CHANGE UP
INR BLD: 1.33 RATIO — HIGH (ref 0.88–1.16)
LACTATE SERPL-SCNC: 4.8 MMOL/L — CRITICAL HIGH (ref 0.7–2)
LG PLATELETS BLD QL AUTO: SLIGHT — SIGNIFICANT CHANGE UP
LYMPHOCYTES # BLD AUTO: 0.46 K/UL — LOW (ref 1–3.3)
LYMPHOCYTES # BLD AUTO: 4 % — LOW (ref 13–44)
MANUAL SMEAR VERIFICATION: SIGNIFICANT CHANGE UP
MCHC RBC-ENTMCNC: 27.3 PG — SIGNIFICANT CHANGE UP (ref 27–34)
MCHC RBC-ENTMCNC: 30.4 G/DL — LOW (ref 32–36)
MCV RBC AUTO: 89.9 FL — SIGNIFICANT CHANGE UP (ref 80–100)
MONOCYTES # BLD AUTO: 0.34 K/UL — SIGNIFICANT CHANGE UP (ref 0–0.9)
MONOCYTES NFR BLD AUTO: 3 % — SIGNIFICANT CHANGE UP (ref 2–14)
NEUTROPHILS # BLD AUTO: 10.65 K/UL — HIGH (ref 1.8–7.4)
NEUTROPHILS NFR BLD AUTO: 86 % — HIGH (ref 43–77)
NEUTS BAND # BLD: 7 % — SIGNIFICANT CHANGE UP (ref 0–8)
NRBC # BLD: 0 /100 — SIGNIFICANT CHANGE UP (ref 0–0)
NRBC # BLD: SIGNIFICANT CHANGE UP /100 WBCS (ref 0–0)
NT-PROBNP SERPL-SCNC: 958 PG/ML — HIGH (ref 0–450)
PCO2 BLDA: 35 MMHG — SIGNIFICANT CHANGE UP (ref 32–46)
PH BLD: 7.46 — HIGH (ref 7.35–7.45)
PLAT MORPH BLD: ABNORMAL
PLATELET # BLD AUTO: 167 K/UL — SIGNIFICANT CHANGE UP (ref 150–400)
PO2 BLDA: 85 MMHG — SIGNIFICANT CHANGE UP (ref 83–108)
POTASSIUM SERPL-MCNC: 4.2 MMOL/L — SIGNIFICANT CHANGE UP (ref 3.5–5.3)
POTASSIUM SERPL-SCNC: 4.2 MMOL/L — SIGNIFICANT CHANGE UP (ref 3.5–5.3)
PROT SERPL-MCNC: 7.5 GM/DL — SIGNIFICANT CHANGE UP (ref 6–8.3)
PROTHROM AB SERPL-ACNC: 15.2 SEC — HIGH (ref 10.6–13.6)
RBC # BLD: 4.94 M/UL — SIGNIFICANT CHANGE UP (ref 4.2–5.8)
RBC # FLD: 16.6 % — HIGH (ref 10.3–14.5)
RBC BLD AUTO: ABNORMAL
SAO2 % BLDA: 97.6 % — SIGNIFICANT CHANGE UP (ref 94–98)
SARS-COV-2 RNA SPEC QL NAA+PROBE: SIGNIFICANT CHANGE UP
SODIUM SERPL-SCNC: 151 MMOL/L — HIGH (ref 135–145)
TOXIC GRANULES BLD QL SMEAR: PRESENT — SIGNIFICANT CHANGE UP
TROPONIN I, HIGH SENSITIVITY RESULT: 185.7 NG/L — HIGH
WBC # BLD: 11.45 K/UL — HIGH (ref 3.8–10.5)
WBC # FLD AUTO: 11.45 K/UL — HIGH (ref 3.8–10.5)

## 2022-02-13 PROCEDURE — 99285 EMERGENCY DEPT VISIT HI MDM: CPT

## 2022-02-13 PROCEDURE — 93010 ELECTROCARDIOGRAM REPORT: CPT

## 2022-02-13 PROCEDURE — 71275 CT ANGIOGRAPHY CHEST: CPT | Mod: 26,MA

## 2022-02-13 PROCEDURE — 71045 X-RAY EXAM CHEST 1 VIEW: CPT | Mod: 26

## 2022-02-13 RX ORDER — CEFTRIAXONE 500 MG/1
1000 INJECTION, POWDER, FOR SOLUTION INTRAMUSCULAR; INTRAVENOUS ONCE
Refills: 0 | Status: COMPLETED | OUTPATIENT
Start: 2022-02-13 | End: 2022-02-13

## 2022-02-13 RX ORDER — HEPARIN SODIUM 5000 [USP'U]/ML
3500 INJECTION INTRAVENOUS; SUBCUTANEOUS ONCE
Refills: 0 | Status: COMPLETED | OUTPATIENT
Start: 2022-02-13 | End: 2022-02-13

## 2022-02-13 RX ORDER — HEPARIN SODIUM 5000 [USP'U]/ML
1500 INJECTION INTRAVENOUS; SUBCUTANEOUS EVERY 6 HOURS
Refills: 0 | Status: DISCONTINUED | OUTPATIENT
Start: 2022-02-13 | End: 2022-02-16

## 2022-02-13 RX ORDER — SODIUM CHLORIDE 9 MG/ML
1000 INJECTION INTRAMUSCULAR; INTRAVENOUS; SUBCUTANEOUS ONCE
Refills: 0 | Status: COMPLETED | OUTPATIENT
Start: 2022-02-13 | End: 2022-02-13

## 2022-02-13 RX ORDER — HEPARIN SODIUM 5000 [USP'U]/ML
INJECTION INTRAVENOUS; SUBCUTANEOUS
Qty: 25000 | Refills: 0 | Status: DISCONTINUED | OUTPATIENT
Start: 2022-02-13 | End: 2022-02-14

## 2022-02-13 RX ORDER — SODIUM CHLORIDE 9 MG/ML
500 INJECTION INTRAMUSCULAR; INTRAVENOUS; SUBCUTANEOUS ONCE
Refills: 0 | Status: COMPLETED | OUTPATIENT
Start: 2022-02-13 | End: 2022-02-13

## 2022-02-13 RX ORDER — HEPARIN SODIUM 5000 [USP'U]/ML
3500 INJECTION INTRAVENOUS; SUBCUTANEOUS EVERY 6 HOURS
Refills: 0 | Status: DISCONTINUED | OUTPATIENT
Start: 2022-02-13 | End: 2022-02-16

## 2022-02-13 RX ORDER — AZITHROMYCIN 500 MG/1
500 TABLET, FILM COATED ORAL ONCE
Refills: 0 | Status: COMPLETED | OUTPATIENT
Start: 2022-02-13 | End: 2022-02-13

## 2022-02-13 RX ADMIN — SODIUM CHLORIDE 1000 MILLILITER(S): 9 INJECTION INTRAMUSCULAR; INTRAVENOUS; SUBCUTANEOUS at 21:21

## 2022-02-13 RX ADMIN — AZITHROMYCIN 255 MILLIGRAM(S): 500 TABLET, FILM COATED ORAL at 19:38

## 2022-02-13 RX ADMIN — AZITHROMYCIN 500 MILLIGRAM(S): 500 TABLET, FILM COATED ORAL at 21:21

## 2022-02-13 RX ADMIN — CEFTRIAXONE 100 MILLIGRAM(S): 500 INJECTION, POWDER, FOR SOLUTION INTRAMUSCULAR; INTRAVENOUS at 19:37

## 2022-02-13 RX ADMIN — HEPARIN SODIUM 800 UNIT(S)/HR: 5000 INJECTION INTRAVENOUS; SUBCUTANEOUS at 23:55

## 2022-02-13 RX ADMIN — HEPARIN SODIUM 3500 UNIT(S): 5000 INJECTION INTRAVENOUS; SUBCUTANEOUS at 23:52

## 2022-02-13 RX ADMIN — CEFTRIAXONE 1000 MILLIGRAM(S): 500 INJECTION, POWDER, FOR SOLUTION INTRAMUSCULAR; INTRAVENOUS at 21:21

## 2022-02-13 NOTE — ED PROVIDER NOTE - SKIN, MLM
Skin normal color for race, warm, dry and intact. No evidence of rash. necrotic area of the first right toe.

## 2022-02-13 NOTE — ED PROVIDER NOTE - PROGRESS NOTE DETAILS
shreya daniels - signout from dr ortiz. pt with pna on right lobe. needs abx. lactate elevated. despite chf, needs fluid resusc. ddimer high, though creatinine elevated, will need cta to ro pe.     I read ekg as sinus rhythm with pvcs, no st elevation or depression, qtc 377, narrow qrs, normal axis, t flat laterally. [place] - lactate needs repeating. cta shows small pe. trop elevated. start heparin. increase fluid resuscitation.

## 2022-02-13 NOTE — ED ADULT NURSE NOTE - OBJECTIVE STATEMENT
Patient alert and responsive, patient noted with congested cough, no wheezing noted. Denies chest pain, or SOB. Noted with O2 sat of 89% on RA. Placed on Non- rebreather Mask at 15L/min, oxygen saturation improved to 99%.

## 2022-02-13 NOTE — ED PROVIDER NOTE - OBJECTIVE STATEMENT
Pt is a 89 year old male w/PMH of CHF, HTN, BIBA from home for cough and wheezing x 2 weeks. As per HPI his home health aid called [phone]. Home health aid is not at beside to provide hx at this time. Pt is unsure if vaccinated. Triage note reports pt had decreased appetite. Pt has no complaints in ED when asked. Denies CP fevers, but noted at bedside to have coughing.

## 2022-02-13 NOTE — ED ADULT TRIAGE NOTE - CHIEF COMPLAINT QUOTE
pt from home, as per ems, pt c/o wet cough, wheezing, x 2 weeks and decreased appetite started today. HHA called. pt seen in ED x 2 weeks ago for cough.

## 2022-02-13 NOTE — ED PROVIDER NOTE - CLINICAL SUMMARY MEDICAL DECISION MAKING FREE TEXT BOX
Pt with reports of coughing and wheezing hypoxic on arrival on room air. Pt place on nonrebreather because no improvement with nasal canula.   DDx: COVID pneumonia, CHF, PE, will check labs, chest X-ray, EKG admit.

## 2022-02-13 NOTE — ED PROVIDER NOTE - CONSTITUTIONAL, MLM
Thin appearing, awake, alert, oriented to person, place, time/situation and in no apparent distress. normal...

## 2022-02-13 NOTE — ED PROVIDER NOTE - CARE PLAN
1 Principal Discharge DX:	CAP (community acquired pneumonia)  Secondary Diagnosis:	Acute pulmonary embolism

## 2022-02-13 NOTE — ED ADULT NURSE REASSESSMENT NOTE - NS ED NURSE REASSESS COMMENT FT1
Pt resting in bed, awake, spontaneous movements noted. Cardiac monitor ongoing. RT at bedside for ABG. Will continue to monitor. Pt resting in bed noted to be sleeping, responsive to verbal and tactile stimuli.  Pt alert and oriented self, aware that he is in the hospital, however unable to give specifics. spontaneous movements noted. Cardiac monitor ongoing. RT at bedside for ABG. Will continue to monitor.

## 2022-02-14 DIAGNOSIS — E87.2 ACIDOSIS: ICD-10-CM

## 2022-02-14 DIAGNOSIS — I26.99 OTHER PULMONARY EMBOLISM WITHOUT ACUTE COR PULMONALE: ICD-10-CM

## 2022-02-14 DIAGNOSIS — J18.9 PNEUMONIA, UNSPECIFIED ORGANISM: ICD-10-CM

## 2022-02-14 DIAGNOSIS — N17.9 ACUTE KIDNEY FAILURE, UNSPECIFIED: ICD-10-CM

## 2022-02-14 DIAGNOSIS — C61 MALIGNANT NEOPLASM OF PROSTATE: ICD-10-CM

## 2022-02-14 DIAGNOSIS — G93.41 METABOLIC ENCEPHALOPATHY: ICD-10-CM

## 2022-02-14 DIAGNOSIS — E87.0 HYPEROSMOLALITY AND HYPERNATREMIA: ICD-10-CM

## 2022-02-14 DIAGNOSIS — R77.8 OTHER SPECIFIED ABNORMALITIES OF PLASMA PROTEINS: ICD-10-CM

## 2022-02-14 DIAGNOSIS — I50.9 HEART FAILURE, UNSPECIFIED: ICD-10-CM

## 2022-02-14 LAB
ANION GAP SERPL CALC-SCNC: 6 MMOL/L — SIGNIFICANT CHANGE UP (ref 5–17)
APTT BLD: 118.1 SEC — HIGH (ref 27.5–35.5)
APTT BLD: 99.4 SEC — HIGH (ref 27.5–35.5)
BASE EXCESS BLDA CALC-SCNC: -0.5 MMOL/L — SIGNIFICANT CHANGE UP (ref -2–3)
BLOOD GAS COMMENTS: SIGNIFICANT CHANGE UP
BLOOD GAS COMMENTS: SIGNIFICANT CHANGE UP
BUN SERPL-MCNC: 54 MG/DL — HIGH (ref 7–23)
CALCIUM SERPL-MCNC: 8.7 MG/DL — SIGNIFICANT CHANGE UP (ref 8.5–10.1)
CHLORIDE SERPL-SCNC: 123 MMOL/L — HIGH (ref 96–108)
CO2 BLDA-SCNC: 25 MMOL/L — HIGH (ref 19–24)
CO2 SERPL-SCNC: 23 MMOL/L — SIGNIFICANT CHANGE UP (ref 22–31)
CREAT SERPL-MCNC: 1.46 MG/DL — HIGH (ref 0.5–1.3)
GLUCOSE SERPL-MCNC: 101 MG/DL — HIGH (ref 70–99)
GRAM STN FLD: SIGNIFICANT CHANGE UP
HCO3 BLDA-SCNC: 24 MMOL/L — SIGNIFICANT CHANGE UP (ref 21–28)
HCT VFR BLD CALC: 40.5 % — SIGNIFICANT CHANGE UP (ref 39–50)
HGB BLD-MCNC: 12.2 G/DL — LOW (ref 13–17)
HOROWITZ INDEX BLDA+IHG-RTO: 100 — SIGNIFICANT CHANGE UP
LACTATE SERPL-SCNC: 2.6 MMOL/L — HIGH (ref 0.7–2)
LACTATE SERPL-SCNC: 3 MMOL/L — HIGH (ref 0.7–2)
LACTATE SERPL-SCNC: 5.4 MMOL/L — CRITICAL HIGH (ref 0.7–2)
LACTATE SERPL-SCNC: 5.6 MMOL/L — CRITICAL HIGH (ref 0.7–2)
LACTATE SERPL-SCNC: 6.1 MMOL/L — CRITICAL HIGH (ref 0.7–2)
MCHC RBC-ENTMCNC: 27.4 PG — SIGNIFICANT CHANGE UP (ref 27–34)
MCHC RBC-ENTMCNC: 30.1 G/DL — LOW (ref 32–36)
MCV RBC AUTO: 91 FL — SIGNIFICANT CHANGE UP (ref 80–100)
METHOD TYPE: SIGNIFICANT CHANGE UP
MSSA DNA SPEC QL NAA+PROBE: SIGNIFICANT CHANGE UP
NRBC # BLD: 0 /100 WBCS — SIGNIFICANT CHANGE UP (ref 0–0)
PCO2 BLDA: 39 MMHG — SIGNIFICANT CHANGE UP (ref 32–46)
PH BLD: 7.4 — SIGNIFICANT CHANGE UP (ref 7.35–7.45)
PLATELET # BLD AUTO: 158 K/UL — SIGNIFICANT CHANGE UP (ref 150–400)
PO2 BLDA: 121 MMHG — HIGH (ref 83–108)
POTASSIUM SERPL-MCNC: 3.7 MMOL/L — SIGNIFICANT CHANGE UP (ref 3.5–5.3)
POTASSIUM SERPL-SCNC: 3.7 MMOL/L — SIGNIFICANT CHANGE UP (ref 3.5–5.3)
RBC # BLD: 4.45 M/UL — SIGNIFICANT CHANGE UP (ref 4.2–5.8)
RBC # FLD: 16.4 % — HIGH (ref 10.3–14.5)
SAO2 % BLDA: 99.4 % — HIGH (ref 94–98)
SODIUM SERPL-SCNC: 152 MMOL/L — HIGH (ref 135–145)
SPECIMEN SOURCE: SIGNIFICANT CHANGE UP
TROPONIN I, HIGH SENSITIVITY RESULT: 109 NG/L — HIGH
WBC # BLD: 11.21 K/UL — HIGH (ref 3.8–10.5)
WBC # FLD AUTO: 11.21 K/UL — HIGH (ref 3.8–10.5)

## 2022-02-14 PROCEDURE — 99223 1ST HOSP IP/OBS HIGH 75: CPT

## 2022-02-14 PROCEDURE — 99222 1ST HOSP IP/OBS MODERATE 55: CPT

## 2022-02-14 RX ORDER — PIPERACILLIN AND TAZOBACTAM 4; .5 G/20ML; G/20ML
3.38 INJECTION, POWDER, LYOPHILIZED, FOR SOLUTION INTRAVENOUS
Refills: 0 | Status: DISCONTINUED | OUTPATIENT
Start: 2022-02-15 | End: 2022-02-16

## 2022-02-14 RX ORDER — AZITHROMYCIN 500 MG/1
500 TABLET, FILM COATED ORAL EVERY 24 HOURS
Refills: 0 | Status: DISCONTINUED | OUTPATIENT
Start: 2022-02-14 | End: 2022-02-14

## 2022-02-14 RX ORDER — BICALUTAMIDE 50 MG/1
50 TABLET, FILM COATED ORAL DAILY
Refills: 0 | Status: DISCONTINUED | OUTPATIENT
Start: 2022-02-14 | End: 2022-02-15

## 2022-02-14 RX ORDER — CEFTRIAXONE 500 MG/1
1000 INJECTION, POWDER, FOR SOLUTION INTRAMUSCULAR; INTRAVENOUS EVERY 24 HOURS
Refills: 0 | Status: DISCONTINUED | OUTPATIENT
Start: 2022-02-14 | End: 2022-02-14

## 2022-02-14 RX ORDER — ATORVASTATIN CALCIUM 80 MG/1
20 TABLET, FILM COATED ORAL AT BEDTIME
Refills: 0 | Status: DISCONTINUED | OUTPATIENT
Start: 2022-02-14 | End: 2022-02-15

## 2022-02-14 RX ORDER — SPIRONOLACTONE 25 MG/1
25 TABLET, FILM COATED ORAL DAILY
Refills: 0 | Status: DISCONTINUED | OUTPATIENT
Start: 2022-02-14 | End: 2022-02-15

## 2022-02-14 RX ORDER — HEPARIN SODIUM 5000 [USP'U]/ML
3500 INJECTION INTRAVENOUS; SUBCUTANEOUS EVERY 6 HOURS
Refills: 0 | Status: DISCONTINUED | OUTPATIENT
Start: 2022-02-14 | End: 2022-02-14

## 2022-02-14 RX ORDER — PIPERACILLIN AND TAZOBACTAM 4; .5 G/20ML; G/20ML
3.38 INJECTION, POWDER, LYOPHILIZED, FOR SOLUTION INTRAVENOUS ONCE
Refills: 0 | Status: COMPLETED | OUTPATIENT
Start: 2022-02-14 | End: 2022-02-14

## 2022-02-14 RX ORDER — HEPARIN SODIUM 5000 [USP'U]/ML
INJECTION INTRAVENOUS; SUBCUTANEOUS
Qty: 25000 | Refills: 0 | Status: DISCONTINUED | OUTPATIENT
Start: 2022-02-14 | End: 2022-02-16

## 2022-02-14 RX ORDER — HEPARIN SODIUM 5000 [USP'U]/ML
1500 INJECTION INTRAVENOUS; SUBCUTANEOUS EVERY 6 HOURS
Refills: 0 | Status: DISCONTINUED | OUTPATIENT
Start: 2022-02-14 | End: 2022-02-14

## 2022-02-14 RX ORDER — PIPERACILLIN AND TAZOBACTAM 4; .5 G/20ML; G/20ML
3.38 INJECTION, POWDER, LYOPHILIZED, FOR SOLUTION INTRAVENOUS
Refills: 0 | Status: DISCONTINUED | OUTPATIENT
Start: 2022-02-14 | End: 2022-02-14

## 2022-02-14 RX ORDER — SODIUM CHLORIDE 9 MG/ML
500 INJECTION, SOLUTION INTRAVENOUS
Refills: 0 | Status: COMPLETED | OUTPATIENT
Start: 2022-02-14 | End: 2022-02-14

## 2022-02-14 RX ORDER — SODIUM CHLORIDE 9 MG/ML
1000 INJECTION, SOLUTION INTRAVENOUS
Refills: 0 | Status: DISCONTINUED | OUTPATIENT
Start: 2022-02-14 | End: 2022-02-15

## 2022-02-14 RX ADMIN — PIPERACILLIN AND TAZOBACTAM 200 GRAM(S): 4; .5 INJECTION, POWDER, LYOPHILIZED, FOR SOLUTION INTRAVENOUS at 18:03

## 2022-02-14 RX ADMIN — SODIUM CHLORIDE 500 MILLILITER(S): 9 INJECTION INTRAMUSCULAR; INTRAVENOUS; SUBCUTANEOUS at 00:00

## 2022-02-14 RX ADMIN — SODIUM CHLORIDE 150 MILLILITER(S): 9 INJECTION, SOLUTION INTRAVENOUS at 13:41

## 2022-02-14 RX ADMIN — HEPARIN SODIUM 800 UNIT(S)/HR: 5000 INJECTION INTRAVENOUS; SUBCUTANEOUS at 08:25

## 2022-02-14 RX ADMIN — AZITHROMYCIN 255 MILLIGRAM(S): 500 TABLET, FILM COATED ORAL at 06:18

## 2022-02-14 RX ADMIN — SODIUM CHLORIDE 150 MILLILITER(S): 9 INJECTION, SOLUTION INTRAVENOUS at 21:15

## 2022-02-14 RX ADMIN — HEPARIN SODIUM 700 UNIT(S)/HR: 5000 INJECTION INTRAVENOUS; SUBCUTANEOUS at 19:46

## 2022-02-14 RX ADMIN — CEFTRIAXONE 100 MILLIGRAM(S): 500 INJECTION, POWDER, FOR SOLUTION INTRAMUSCULAR; INTRAVENOUS at 05:07

## 2022-02-14 RX ADMIN — HEPARIN SODIUM 800 UNIT(S)/HR: 5000 INJECTION INTRAVENOUS; SUBCUTANEOUS at 01:41

## 2022-02-14 RX ADMIN — HEPARIN SODIUM 700 UNIT(S)/HR: 5000 INJECTION INTRAVENOUS; SUBCUTANEOUS at 18:12

## 2022-02-14 RX ADMIN — HEPARIN SODIUM 700 UNIT(S)/HR: 5000 INJECTION INTRAVENOUS; SUBCUTANEOUS at 09:39

## 2022-02-14 RX ADMIN — SODIUM CHLORIDE 50 MILLILITER(S): 9 INJECTION, SOLUTION INTRAVENOUS at 10:23

## 2022-02-14 NOTE — ED ADULT NURSE REASSESSMENT NOTE - NS ED NURSE REASSESS COMMENT FT1
Pt transferred ti unit with writer and ED tech. Pt transferred on cardiac monitoing..Spo2 on arrival to unit 100% on 4L nasal cannula. Pt awake and responding to name. pt safety maintained.

## 2022-02-14 NOTE — H&P ADULT - PROBLEM SELECTOR PLAN 1
Unclear baseline mental status.  Patient presents with hypothermia  RLL infiltrate on CXR (unofficially), UA negative  Reported hx of failure to thrive.  Will get swallow eval  maintenance fluids

## 2022-02-14 NOTE — PROGRESS NOTE ADULT - SUBJECTIVE AND OBJECTIVE BOX
Patient is a 89y old  Male who presents with a chief complaint of PE, PNA? (14 Feb 2022 08:40)      INTERVAL HPI/OVERNIGHT EVENTS: Pt laying in bed, agitated, taking his mask off, AOx1     MEDICATIONS  (STANDING):  atorvastatin 20 milliGRAM(s) Oral at bedtime  azithromycin  IVPB 500 milliGRAM(s) IV Intermittent every 24 hours  bicalutamide 50 milliGRAM(s) Oral daily  cefTRIAXone   IVPB 1000 milliGRAM(s) IV Intermittent every 24 hours  dextrose 5%. 1000 milliLiter(s) (150 mL/Hr) IV Continuous <Continuous>  heparin  Infusion.  Unit(s)/Hr (8 mL/Hr) IV Continuous <Continuous>  spironolactone 25 milliGRAM(s) Oral daily    MEDICATIONS  (PRN):  heparin   Injectable 3500 Unit(s) IV Push every 6 hours PRN For aPTT less than 40  heparin   Injectable 1500 Unit(s) IV Push every 6 hours PRN For aPTT between 40 - 57      Allergies    No Known Allergies    Intolerances        REVIEW OF SYSTEMS:  unable to obtain fully due to mentation     Vital Signs Last 24 Hrs  T(C): 36.3 (14 Feb 2022 11:56), Max: 36.6 (14 Feb 2022 04:42)  T(F): 97.4 (14 Feb 2022 11:56), Max: 97.8 (14 Feb 2022 04:42)  HR: 70 (14 Feb 2022 11:56) (66 - 94)  BP: 109/72 (14 Feb 2022 11:56) (107/71 - 131/88)  BP(mean): --  RR: 14 (14 Feb 2022 11:56) (14 - 25)  SpO2: 92% (14 Feb 2022 11:56) (89% - 100%)    PHYSICAL EXAM:  GENERAL: NAD, well-groomed, well-developed, very thin   HEAD:  Atraumatic, Normocephalic  EYES: EOMI, PERRLA, conjunctiva and sclera clear  ENMT: No tonsillar erythema, exudates, or enlargement; Moist mucous membranes, Good dentition, No lesions  NECK: Supple, No JVD,  NERVOUS SYSTEM:  Alert & Oriented X1,   CHEST/LUNG: Clear to percussion bilaterally; No rales, rhonchi, wheezing, or rubs  HEART: Regular rate and rhythm; No murmurs, rubs, or gallops  ABDOMEN: Thin,, Soft, Nontender, Nondistended; Bowel sounds present  EXTREMITIES:  2+ Peripheral Pulses, No clubbing, cyanosis, or edema  SKIN: No rashes or lesions    LABS:                        12.2   11.21 )-----------( 158      ( 14 Feb 2022 06:33 )             40.5     02-14    152<H>  |  123<H>  |  54<H>  ----------------------------<  101<H>  3.7   |  23  |  1.46<H>    Ca    8.7      14 Feb 2022 02:58    TPro  7.5  /  Alb  2.1<L>  /  TBili  0.6  /  DBili  x   /  AST  124<H>  /  ALT  62  /  AlkPhos  99  02-13    PT/INR - ( 13 Feb 2022 22:42 )   PT: 15.2 sec;   INR: 1.33 ratio         PTT - ( 14 Feb 2022 06:33 )  PTT:118.1 sec    CAPILLARY BLOOD GLUCOSE          RADIOLOGY & ADDITIONAL TESTS:    Imaging Personally Reviewed:  [ ] YES  [ ] NO    Consultant(s) Notes Reviewed:  [ ] YES  [ ] NO    Care Discussed with Consultants/Other Providers [ ] YES  [ ] NO

## 2022-02-14 NOTE — CONSULT NOTE ADULT - SUBJECTIVE AND OBJECTIVE BOX
CARDIOLOGY CONSULTATION NOTE                                                                             SANDRA ROCHE is a 89y Male with a reported history of CHF?, prostate Ca. On meds for HLD and gastritis (h/o bleeding ulcer in 2015 and 2018). Chronic LE venous insufficiency. Ex-smoker, quit 10 years ago. Generally wheelchair bound. As per chart, he presented to the ED for evaluation of dyspnea and cough.  Patient unable to provide history.  Patient reportedly noted to have decreased appetite and wheezing by his HHA who called EMS.   SpO2 currently 96 on 4L via NC.  Vitals stable, labs show mild leukocytosis.  No report of chest pain. Was treated for community acquired pneumonia in ED (suspected RL pneumonia), also found small PE on CT. Elevated troponins noted.   REVIEW OF SYSTEMS: NA -----------------------------  Home Medications: bicalutamide 50 mg oral tablet: 1 tab(s) orally once a day (14 Feb 2018 14:15) pantoprazole 40 mg intravenous injection: 40 milligram(s) intravenous every 12 hours (14 Feb 2018 14:15) rosuvastatin 10 mg oral tablet: 1 tab(s) orally once a day (at bedtime) (11 Feb 2018 16:28) spironolactone 25 mg oral tablet: 1 tab(s) orally once a day (14 Feb 2018 14:15) sucralfate 1 g/10 mL oral suspension: 10 milliliter(s) orally 4 times a day (14 Feb 2018 14:15)   MEDICATIONS  (STANDING): azithromycin  IVPB 500 milliGRAM(s) IV Intermittent every 24 hours bicalutamide 50 milliGRAM(s) Oral daily cefTRIAXone   IVPB 1000 milliGRAM(s) IV Intermittent every 24 hours heparin  Infusion.  Unit(s)/Hr (8 mL/Hr) IV Continuous <Continuous> spironolactone 25 milliGRAM(s) Oral daily   ALLERGIES: No Known Allergies   FAMILY HISTORY: FH: HTN (hypertension)    PHYSICAL EXAMINATION: ----------------------------- T(C): 36.6 (02-14-22 @ 04:42), Max: 36.6 (02-14-22 @ 04:42) HR: 86 (02-14-22 @ 04:42) (66 - 94) BP: 113/77 (02-14-22 @ 04:42) (107/71 - 131/88) RR: 14 (02-14-22 @ 04:42) (14 - 25) SpO2: 100% (02-14-22 @ 04:42) (89% - 100%) Wt(kg): --  Height (cm): 167.6 (02-13 @ 17:02) Weight (kg): 44.4 (02-14 @ 01:14) BMI (kg/m2): 15.8 (02-14 @ 01:14) BSA (m2): 1.48 (02-14 @ 01:14)  Constitutional: cachectic, no acute respiratory distress, on FM oxygen.  Eyes: the conjunctiva exhibited no abnormalities and the eyelids demonstrated no xanthelasmas.  HEENT: normal oral mucosa, no oral pallor and no oral cyanosis.  Neck: normal jugular venous A waves present, normal jugular venous V waves present and no jugular venous bravo A waves.  Pulmonary: no respiratory distress, normal respiratory rhythm and effort, no accessory muscle use and lungs were clear to auscultation bilaterally.  Cardiovascular: heart rate and rhythm were normal, normal S1 and S2 and no murmur, gallop, rub, heave or thrill are present.  Abdomen: soft, non-tender, no hepato-splenomegaly and no abdominal mass palpated.  Musculoskeletal: the gait could not be assessed..  Extremities: no clubbing of the fingernails, no localized cyanosis, no petechial hemorrhages and no ischemic changes.  Skin: normal skin color and pigmentation, no rash, no venous stasis, no skin lesions, no skin ulcer and no xanthoma was observed.    ECG: ------- < from: 12 Lead ECG (02.13.22 @ 19:49) >  Ventricular Rate 84 BPM  Atrial Rate 84 BPM  P-R Interval 134 ms  QRS Duration 98 ms  Q-T Interval 332 ms  QTC Calculation(Bazett) 392 ms  P Axis 62 degrees  R Axis 29 degrees  T Axis -46 degrees  Diagnosis Line Sinus rhythm with occasional premature ventricular complexes and premature atrial complexes Nonspecific ST and T wave abnormality Abnormal ECG When compared with ECG of 13-FEB-2022 19:49, premature atrial complexes are now present Confirmed by Aníbal Samayoa MD ([id number]) on 2/14/2022 11:24:33 AM  < end of copied text >    LABS:  -------- 02-14  152<H>  |  123<H>  |  54<H> ----------------------------<  101<H> 3.7   |  23  |  1.46<H>  Ca    8.7      14 Feb 2022 02:58  TPro  7.5  /  Alb  2.1<L>  /  TBili  0.6  /  DBili  x   /  AST  124<H>  /  ALT  62  /  AlkPhos  99  02-13                       12.2  11.21 )-----------( 158      ( 14 Feb 2022 06:33 )            40.5    PT/INR - ( 13 Feb 2022 22:42 )   PT: 15.2 sec;   INR: 1.33 ratio      PTT - ( 14 Feb 2022 06:33 )  PTT:118.1 sec 02-13 @ 19:15 BNP: 958 pg/mL   Troponin I, High Sensitivity Result: 109.0:  (02.14.22 @ 02:58)  Troponin I, High Sensitivity Result: 185.7:  (02.13.22 @ 19:15)    RADIOLOGY REPORTS: ----------------------------- < from: CT Angio Chest PE Protocol w/ IV Cont (02.13.22 @ 21:41) >  ACC: [id number] EXAM:  CT ANGIO CHEST PULM [facility]                        PROCEDURE DATE:  02/13/2022      INTERPRETATION:  CLINICAL INFORMATION: Cough, not eating. Pain. Question  pulmonary embolism  COMPARISON: CT abdomen pelvis 10/24/2009. X-ray chest 2/13/2022.  CONTRAST/COMPLICATIONS: IV Contrast: Omnipaque 350  70 cc administered   30 cc discarded Oral Contrast: NONE Complications: None reported at time of study completion  PROCEDURE: CT Angiography of the Chest. Sagittal and coronal reformats were performed as well as 3D (MIP)  reconstructions.  FINDINGS:  LUNGS AND AIRWAYS: Patent central airways. Mild emphysema. Right lung  patchy consolidations suspicious for infection. Correlate clinically and  follow to resolution. PLEURA: No pleural effusion. MEDIASTINUM AND HAIR: No lymphadenopathy. VESSELS: Pulmonary embolism in the segmental right lower lobe (6:301-303). HEART: Heart size is normal. No pericardial effusion. No heart strain. CHEST WALL AND LOWER NECK: Within normal limits. VISUALIZED UPPER ABDOMEN: Subcentimeter nonobstructing right renal  calcifications.. BONES: Severe thoracolumbar scoliosis with associated degenerative  changes.  IMPRESSION:  Pulmonary embolism in the segmentalright lower lobe (6:301-303).  Right lung patchy consolidations suspicious for infection. Correlate  clinically and follow to resolution. Findings were discussed with Dr. Noland 2/13/2022 10:36 PM by Dr. Arcos with read back confirmation.  --- End of Report ---      MARKELL ARCOS MD; Attending Radiologist This document has been electronically signed. Feb 13 2022 10:40PM  < end of copied text >    ECHOCARDIOGRAM: --------------------------- Prior on 2/13/18 - EF 60-65%, Mild AI,TR.

## 2022-02-14 NOTE — PATIENT PROFILE ADULT - STAGE
Consults           Today's Date: 7/30/2019  Patient Name: Shanta Mendez III  Date of admission: 7/30/2019 11:28 AM  Patient's age: 44 y.o., 1980  Admission Dx: Syncope and collapse [R55]  Syncope and collapse [R55]    Reason for Consult: ICD discharge  Requesting Physician: Kayla Pearl MD    CHIEF COMPLAINT: Syncope and ICD shock    History Obtained From:  patient    HISTORY OF PRESENT ILLNESS:      The patient is a 44 y.o.  male who is admitted to the hospital for syncope  Mr. Graceann Jeans is well-known to me for a history of dilated cardiomyopathy known for more than 5 years. His other problems include morbid obesity, sleep apnea and noncompliance with the use of his CPAP as well as hypertension and diabetes which are long-standing. The patient was also noncompliant with follow-up in my office although he has had a single-chamber ICD implant for several years. After his hospitalization in October 2018 for palpitations and shortness of breath he was brought in for upgrade of his device to a biventricular pacer ICD. In the last 6 months the patient has been using his CPAP regularly, exercising regularly and attempting to lose weight. He has noticed an improvement in his physical  exercise tolerance. This morning he was walking on his treadmill when he thought that his abdominal belt became extremely tight. He did get off the treadmill when he passed out suddenly and fell on his face. He awakened on the floor and presented to the emergency room. No other recent symptoms of chest discomfort or shortness of breath at rest or on exertion. He has noted frequent palpitations for the past 15-20 days. No other episodes of syncope or near syncope.       Past Medical History:   has a past medical history of Acute CHF ([address] Acute CHF ([facility] [address] Acute idiopathic gout of right foot, Anemia, CAD (coronary artery disease), Cerebral artery occlusion with cerebral infarction [facility]), CKD (chronic kidney (Temporal)   Resp 18   Ht 6' (1.829 m)   Wt (!) 322 lb (146.1 kg)   SpO2 99%   BMI 43.67 kg/m²    Constitutional and General Appearance: alert, cooperative, no distress and appears stated age  [de-identified]: PERRL, no cervical lymphadenopathy. No masses palpable. Normal oral mucosa  Respiratory:  · Normal excursion and expansion without use of accessory muscles  · Resp Auscultation: Good respiratory effort. No for increased work of breathing. On auscultation: clear to auscultation bilaterally  Cardiovascular:  · The apical impulse is laterally displaced  · Heart tones are  normal. regular S1 and S2, at the left sternal border and apex  · Jugular venous pulsation Normal  · The carotid upstroke is normal in amplitude and contour without delay or bruit  · Peripheral pulses are symmetrical and full  Abdomen:  · No masses or tenderness  · Bowel sounds present  Extremities:  ·  No Cyanosis or Clubbing  ·  Lower extremity edema: No  · Skin: Warm and dry  Neurological:  · Alert and oriented. · Moves all extremities well  · No abnormalities of mood, affect, memory, mentation, or behavior are noted    DATA:    Diagnostics:    EKG: normal sinus rhythm, biventricular pacing  ECHO: reviewed. Ejection fraction: 20%--in November 2018  Stress Test: not obtained. Cardiac Angiography: not obtained. Device Evaluation    Make/model Medtronic Claria   Mode DDD 50-1 30  P waves 4.5      mV     Impedance 456       ohms   Pacing threshold 0.5   "[email or web address]   msec  R waves 10.3      MV     Impedance 475       ohms   Pacing threshold 0.75   "[email or web address]   msec   LV lead impedance 125 4 ohms, pacing threshold 1.75 V at 1 ms (D1-D4)  Pacing percentage  A LESS THAN 1 PERCENT            V 91.4%  High voltage impedance 64 ohms   battery longevity 7 years  Optivol measurements do not suggest the presence of change in thoracic impedance/congestive heart failure  Arrhythmias   Persistent atrial flutter for the past 2 weeks.   Sustained monomorphic VT while atrial flutter in ventricular fibrillation today which led to syncope and appropriate ICD shock restoring sinus rhythm. No evidence of heart failure as measured by transthoracic impedance changes  Hyponatremia and elevation of BUN/creatinine levels possibly related to intravascular volume depletion    RECOMMENDATIONS:    IV fluids  as have been already administered in the emergency room  Reduce dose of Demadex  Recheck BMP and BNP tonight  Systemic anticoagulation  Continued use of CPAP at night      Discussed with patient and Nurse.     Rudi Monsivais MD,FACC suspected deep tissue injury stage I

## 2022-02-14 NOTE — H&P ADULT - NSHPLABSRESULTS_GEN_ALL_CORE
Recent Vitals  T(C): 36.3 (02-13-22 @ 23:40), Max: 36.4 (02-13-22 @ 17:02)  HR: 66 (02-14-22 @ 00:02) (66 - 94)  BP: 117/89 (02-14-22 @ 00:02) (107/71 - 131/88)  RR: 14 (02-14-22 @ 00:02) (14 - 25)  SpO2: 100% (02-14-22 @ 00:02) (89% - 100%)                        13.5   11.45 )-----------( 167      ( 13 Feb 2022 19:15 )             44.4     02-13    151<H>  |  117<H>  |  59<H>  ----------------------------<  74  4.2   |  27  |  2.01<H>    Ca    10.0      13 Feb 2022 19:15    TPro  7.5  /  Alb  2.1<L>  /  TBili  0.6  /  DBili  x   /  AST  124<H>  /  ALT  62  /  AlkPhos  99  02-13    PT/INR - ( 13 Feb 2022 22:42 )   PT: 15.2 sec;   INR: 1.33 ratio         PTT - ( 13 Feb 2022 22:42 )  PTT:37.7 sec  LIVER FUNCTIONS - ( 13 Feb 2022 19:15 )  Alb: 2.1 g/dL / Pro: 7.5 gm/dL / ALK PHOS: 99 U/L / ALT: 62 U/L / AST: 124 U/L / GGT: x               Home Medications:  bicalutamide 50 mg oral tablet: 1 tab(s) orally once a day (14 Feb 2018 14:15)  pantoprazole 40 mg intravenous injection: 40 milligram(s) intravenous every 12 hours (14 Feb 2018 14:15)  rosuvastatin 10 mg oral tablet: 1 tab(s) orally once a day (at bedtime) (11 Feb 2018 16:28)  spironolactone 25 mg oral tablet: 1 tab(s) orally once a day (14 Feb 2018 14:15)  sucralfate 1 g/10 mL oral suspension: 10 milliliter(s) orally 4 times a day (14 Feb 2018 14:15)

## 2022-02-14 NOTE — PROGRESS NOTE ADULT - ASSESSMENT
a 89M with a PMH of CHF?, prostate Ca who presents to the ED for dyspnea.  Patient currently AAOx1, unable to provide history.  Patient reportedly noted to have decreased appetite and wheezing by his HHA today who called EMS.  No current complaints.  SpO2 currently 96 on 4L via NC.  Vitals stable, labs show mild leukocytosis.  Will admit to tele.         Metabolic encephalopathy.    Hx of failure to thrive   ·  Plan: Unclear baseline mental status.  Patient presents with hypothermia  RLL infiltrate on CXR (unofficially), UA negative  -swallow eval pending  -lactic acid was very high-decreased now up again  -continue fluids and antibiotics      Acute pulmonary embolus.   continue heparin drip.  Will need to switch to a DOAC but will need to estimate risks/vs benefits given hx of gastric ulcers     RLL Pneumonia.   Underlying COPD  continue ceftriaxone, azithromycin.     Troponin level elevated.   ·  Plan: Troponin elevated.  Will trend  No current chest pain.   As per Cardiology - "no concomitant evidence of acute ischemia clinically or on ECG - likely type II MI in setting of CAP/PE."  ASA relatively contraindicated due to recurrent GI bleed in the past  Bbl relatively contraindicated due to emphysema, smoking history  started on Statin   -ECHO ordered      Chronic CHF.   ·  Plan: spironolactone.     Lactic acidosis.   ·  Plan: Lactate elevated, trending down, now up again  Will continue to monitor.  Fluids     AMNA (acute kidney injury).   ·  Plan: Cr 2.0 from 1.1,   Given 1.5L bolus, will start maintenance fluids  f/u labs.    : Hypernatremia.   ·  Plan: fluids as per above.        Prostate cancer.   ·  Plan: bicalutamide.                Stephane Jason- [phone]     a 89M with a PMH of CHF?, prostate Ca who presents to the ED for dyspnea.  Patient currently AAOx1, unable to provide history.  Patient reportedly noted to have decreased appetite and wheezing by his HHA today who called EMS.  No current complaints.  SpO2 currently 96 on 4L via NC.  Vitals stable, labs show mild leukocytosis.  Will admit to tele.         Metabolic encephalopathy.    Hx of failure to thrive   ·  Plan: Unclear baseline mental status.  Patient presents with hypothermia,   As per son- was congested/ not eating much   Usually pt is bedbound on a wheelchair for most of the day-mainly due to general weakness for over more than 6months, has dementia  Hx of shin wound- and was told not to move around too much for the wound to heal     RLL infiltrate on CXR (unofficially), UA negative  -swallow eval pending  -lactic acid was very high-decreased now up again  -continue fluids and antibiotics      Acute pulmonary embolus.   continue heparin drip.  Will need to switch to a DOAC but will need to estimate risks/vs benefits given hx of gastric ulcers     RLL Pneumonia.   Underlying COPD  continue ceftriaxone, azithromycin.  -on nonrebreather now   -Pulm consult pending     Troponin level elevated.   ·  Plan: Troponin elevated.  Will trend  No current chest pain.   As per Cardiology - "no concomitant evidence of acute ischemia clinically or on ECG - likely type II MI in setting of CAP/PE."  ASA relatively contraindicated due to recurrent GI bleed in the past  Bbl relatively contraindicated due to emphysema, smoking history  started on Statin   -ECHO ordered      Chronic CHF.   ·  Plan: spironolactone.     Lactic acidosis.   ·  Plan: Lactate elevated, trending down, now up again  Will continue to monitor.  Fluids  -will do abg stat     AMNA (acute kidney injury).   ·  Plan: Cr 2.0 from 1.1,   Given 1.5L bolus, will start maintenance fluids  f/u labs.    : Hypernatremia.   ·  Plan: fluids as per above.        Prostate cancer.   ·  Plan: bicalutamide.              Stephane Jason- [phone]-updated  GOC- DNI but still wants resuscitation

## 2022-02-14 NOTE — H&P ADULT - ASSESSMENT
Patient is a 89M with a PMH of CHF?, prostate Ca who presents to the ED for dyspnea.  Patient currently AAOx1, unable to provide history.  Patient reportedly noted to have decreased appetite and wheezing by his HHA today who called EMS.  No current complaints.  SpO2 currently 96 on 4L via NC.  Vitals stable, labs show mild leukocytosis.  Will admit to tele.

## 2022-02-14 NOTE — H&P ADULT - NSHPPHYSICALEXAM_GEN_ALL_CORE
Physical exam:  General: elderly frail male in no acute distress  Head:  Atraumatic, Normocephalic  Eyes: EOMI, PERRLA, clear sclera  Neck: Supple, thyroid nontender, non enlarged  Cardio: S1/S2 +ve   Resp: clear to ausculation bilaterally, no rales or wheezes  GI: abdomen soft, nontender, non distended, no guarding, BS +ve x 4  Ext: no significant pedal edema  Neuro: CN 2-12 intact, no significant motor or sensory deficits.  Skin: No rashes or lesions

## 2022-02-15 ENCOUNTER — TRANSCRIPTION ENCOUNTER (OUTPATIENT)
Age: 87
End: 2022-02-15

## 2022-02-15 LAB
A1C WITH ESTIMATED AVERAGE GLUCOSE RESULT: 5.5 % — SIGNIFICANT CHANGE UP (ref 4–5.6)
ALBUMIN SERPL ELPH-MCNC: 1.5 G/DL — LOW (ref 3.3–5)
ALP SERPL-CCNC: 90 U/L — SIGNIFICANT CHANGE UP (ref 40–120)
ALT FLD-CCNC: 43 U/L — SIGNIFICANT CHANGE UP (ref 12–78)
ANION GAP SERPL CALC-SCNC: 7 MMOL/L — SIGNIFICANT CHANGE UP (ref 5–17)
APPEARANCE UR: CLEAR — SIGNIFICANT CHANGE UP
APTT BLD: 92.7 SEC — HIGH (ref 27.5–35.5)
AST SERPL-CCNC: 54 U/L — HIGH (ref 15–37)
BACTERIA # UR AUTO: ABNORMAL
BILIRUB SERPL-MCNC: 0.5 MG/DL — SIGNIFICANT CHANGE UP (ref 0.2–1.2)
BILIRUB UR-MCNC: NEGATIVE — SIGNIFICANT CHANGE UP
BUN SERPL-MCNC: 36 MG/DL — HIGH (ref 7–23)
CALCIUM SERPL-MCNC: 8.1 MG/DL — LOW (ref 8.5–10.1)
CHLORIDE SERPL-SCNC: 110 MMOL/L — HIGH (ref 96–108)
CHOLEST SERPL-MCNC: 85 MG/DL — SIGNIFICANT CHANGE UP
CO2 SERPL-SCNC: 24 MMOL/L — SIGNIFICANT CHANGE UP (ref 22–31)
COLOR SPEC: YELLOW — SIGNIFICANT CHANGE UP
CREAT SERPL-MCNC: 1.18 MG/DL — SIGNIFICANT CHANGE UP (ref 0.5–1.3)
DIFF PNL FLD: ABNORMAL
ESTIMATED AVERAGE GLUCOSE: 111 MG/DL — SIGNIFICANT CHANGE UP (ref 68–114)
GLUCOSE SERPL-MCNC: 70 MG/DL — SIGNIFICANT CHANGE UP (ref 70–99)
GLUCOSE UR QL: NEGATIVE MG/DL — SIGNIFICANT CHANGE UP
HCT VFR BLD CALC: 38.2 % — LOW (ref 39–50)
HDLC SERPL-MCNC: 32 MG/DL — LOW
HGB BLD-MCNC: 11.6 G/DL — LOW (ref 13–17)
KETONES UR-MCNC: NEGATIVE — SIGNIFICANT CHANGE UP
LACTATE SERPL-SCNC: 4.6 MMOL/L — CRITICAL HIGH (ref 0.7–2)
LACTATE SERPL-SCNC: 5.7 MMOL/L — CRITICAL HIGH (ref 0.7–2)
LEUKOCYTE ESTERASE UR-ACNC: NEGATIVE — SIGNIFICANT CHANGE UP
LIPID PNL WITH DIRECT LDL SERPL: 39 MG/DL — SIGNIFICANT CHANGE UP
MCHC RBC-ENTMCNC: 27.5 PG — SIGNIFICANT CHANGE UP (ref 27–34)
MCHC RBC-ENTMCNC: 30.4 G/DL — LOW (ref 32–36)
MCV RBC AUTO: 90.5 FL — SIGNIFICANT CHANGE UP (ref 80–100)
NITRITE UR-MCNC: NEGATIVE — SIGNIFICANT CHANGE UP
NON HDL CHOLESTEROL: 53 MG/DL — SIGNIFICANT CHANGE UP
NRBC # BLD: 0 /100 WBCS — SIGNIFICANT CHANGE UP (ref 0–0)
PH UR: 5 — SIGNIFICANT CHANGE UP (ref 5–8)
PLATELET # BLD AUTO: 115 K/UL — LOW (ref 150–400)
POTASSIUM SERPL-MCNC: 3.5 MMOL/L — SIGNIFICANT CHANGE UP (ref 3.5–5.3)
POTASSIUM SERPL-SCNC: 3.5 MMOL/L — SIGNIFICANT CHANGE UP (ref 3.5–5.3)
PROT SERPL-MCNC: 6 GM/DL — SIGNIFICANT CHANGE UP (ref 6–8.3)
PROT UR-MCNC: 30 MG/DL
RBC # BLD: 4.22 M/UL — SIGNIFICANT CHANGE UP (ref 4.2–5.8)
RBC # FLD: 15.9 % — HIGH (ref 10.3–14.5)
RBC CASTS # UR COMP ASSIST: ABNORMAL /HPF (ref 0–4)
SODIUM SERPL-SCNC: 141 MMOL/L — SIGNIFICANT CHANGE UP (ref 135–145)
SP GR SPEC: 1.01 — SIGNIFICANT CHANGE UP (ref 1.01–1.02)
TRIGL SERPL-MCNC: 72 MG/DL — SIGNIFICANT CHANGE UP
UROBILINOGEN FLD QL: NEGATIVE MG/DL — SIGNIFICANT CHANGE UP
WBC # BLD: 9.2 K/UL — SIGNIFICANT CHANGE UP (ref 3.8–10.5)
WBC # FLD AUTO: 9.2 K/UL — SIGNIFICANT CHANGE UP (ref 3.8–10.5)
WBC UR QL: SIGNIFICANT CHANGE UP

## 2022-02-15 PROCEDURE — 99233 SBSQ HOSP IP/OBS HIGH 50: CPT

## 2022-02-15 PROCEDURE — 99222 1ST HOSP IP/OBS MODERATE 55: CPT

## 2022-02-15 RX ORDER — SODIUM CHLORIDE 9 MG/ML
500 INJECTION INTRAMUSCULAR; INTRAVENOUS; SUBCUTANEOUS ONCE
Refills: 0 | Status: COMPLETED | OUTPATIENT
Start: 2022-02-15 | End: 2022-02-15

## 2022-02-15 RX ORDER — SODIUM CHLORIDE 9 MG/ML
1000 INJECTION INTRAMUSCULAR; INTRAVENOUS; SUBCUTANEOUS ONCE
Refills: 0 | Status: DISCONTINUED | OUTPATIENT
Start: 2022-02-15 | End: 2022-02-15

## 2022-02-15 RX ORDER — PANTOPRAZOLE SODIUM 20 MG/1
40 TABLET, DELAYED RELEASE ORAL
Refills: 0 | Status: DISCONTINUED | OUTPATIENT
Start: 2022-02-15 | End: 2022-02-22

## 2022-02-15 RX ORDER — SODIUM CHLORIDE 9 MG/ML
1000 INJECTION, SOLUTION INTRAVENOUS
Refills: 0 | Status: DISCONTINUED | OUTPATIENT
Start: 2022-02-15 | End: 2022-02-18

## 2022-02-15 RX ADMIN — SODIUM CHLORIDE 500 MILLILITER(S): 9 INJECTION INTRAMUSCULAR; INTRAVENOUS; SUBCUTANEOUS at 11:47

## 2022-02-15 RX ADMIN — SODIUM CHLORIDE 500 MILLILITER(S): 9 INJECTION INTRAMUSCULAR; INTRAVENOUS; SUBCUTANEOUS at 17:18

## 2022-02-15 RX ADMIN — PIPERACILLIN AND TAZOBACTAM 25 GRAM(S): 4; .5 INJECTION, POWDER, LYOPHILIZED, FOR SOLUTION INTRAVENOUS at 18:22

## 2022-02-15 RX ADMIN — PIPERACILLIN AND TAZOBACTAM 25 GRAM(S): 4; .5 INJECTION, POWDER, LYOPHILIZED, FOR SOLUTION INTRAVENOUS at 05:28

## 2022-02-15 RX ADMIN — HEPARIN SODIUM 700 UNIT(S)/HR: 5000 INJECTION INTRAVENOUS; SUBCUTANEOUS at 01:51

## 2022-02-15 RX ADMIN — SODIUM CHLORIDE 60 MILLILITER(S): 9 INJECTION, SOLUTION INTRAVENOUS at 18:22

## 2022-02-15 RX ADMIN — HEPARIN SODIUM 700 UNIT(S)/HR: 5000 INJECTION INTRAVENOUS; SUBCUTANEOUS at 19:31

## 2022-02-15 RX ADMIN — SODIUM CHLORIDE 150 MILLILITER(S): 9 INJECTION, SOLUTION INTRAVENOUS at 02:21

## 2022-02-15 NOTE — DISCHARGE NOTE PROVIDER - NSDCCPCAREPLAN_GEN_ALL_CORE_FT
PRINCIPAL DISCHARGE DIAGNOSIS  Diagnosis: Acute pulmonary embolism  Assessment and Plan of Treatment:       SECONDARY DISCHARGE DIAGNOSES  Diagnosis: Metabolic encephalopathy  Assessment and Plan of Treatment:     Diagnosis: AMNA (acute kidney injury)  Assessment and Plan of Treatment:     Diagnosis: Troponin level elevated  Assessment and Plan of Treatment:     Diagnosis: Pneumonia  Assessment and Plan of Treatment:     Diagnosis: Acute pulmonary embolism  Assessment and Plan of Treatment:     Diagnosis: Anemia due to acute blood loss  Assessment and Plan of Treatment:     Diagnosis: GI bleed  Assessment and Plan of Treatment:     Diagnosis: Functional quadriplegia  Assessment and Plan of Treatment:     Diagnosis: Acute respiratory failure with hypoxia  Assessment and Plan of Treatment:      PRINCIPAL DISCHARGE DIAGNOSIS  Diagnosis: Acute pulmonary embolism  Assessment and Plan of Treatment:       SECONDARY DISCHARGE DIAGNOSES  Diagnosis: Metabolic encephalopathy  Assessment and Plan of Treatment:     Diagnosis: AMNA (acute kidney injury)  Assessment and Plan of Treatment:     Diagnosis: Troponin level elevated  Assessment and Plan of Treatment:     Diagnosis: Pneumonia  Assessment and Plan of Treatment:     Diagnosis: Acute pulmonary embolism  Assessment and Plan of Treatment:     Diagnosis: Anemia due to acute blood loss  Assessment and Plan of Treatment:     Diagnosis: GI bleed  Assessment and Plan of Treatment:     Diagnosis: Functional quadriplegia  Assessment and Plan of Treatment:     Diagnosis: Acute respiratory failure with hypoxia  Assessment and Plan of Treatment:     Diagnosis: MSSA bacteremia  Assessment and Plan of Treatment:     Diagnosis: Prostate cancer  Assessment and Plan of Treatment:

## 2022-02-15 NOTE — PROGRESS NOTE ADULT - SUBJECTIVE AND OBJECTIVE BOX
Patient is a 89y old  Male who presents with a chief complaint of PE, PNA? (2022 08:40)      INTERVAL HPI/OVERNIGHT EVENTS: Pt laying in bed, in no agitation today, opening his eyes, still with nonrebreather   blood culture- growing stap aureus     MEDICATIONS  (STANDING):  dextrose 5% + sodium chloride 0.45%. 1000 milliLiter(s) (60 mL/Hr) IV Continuous <Continuous>  heparin  Infusion.  Unit(s)/Hr (8 mL/Hr) IV Continuous <Continuous>  pantoprazole    Tablet 40 milliGRAM(s) Oral before breakfast  piperacillin/tazobactam IVPB.. 3.375 Gram(s) IV Intermittent <User Schedule>    MEDICATIONS  (PRN):  heparin   Injectable 3500 Unit(s) IV Push every 6 hours PRN For aPTT less than 40  heparin   Injectable 1500 Unit(s) IV Push every 6 hours PRN For aPTT between 40 - 57        Allergies    No Known Allergies    Intolerances        REVIEW OF SYSTEMS:  unable to obtain fully due to mentation     ICU Vital Signs Last 24 Hrs  T(C): 36.3 (15 Feb 2022 16:14), Max: 36.6 (2022 23:42)  T(F): 97.3 (15 Feb 2022 16:14), Max: 97.8 (2022 23:42)  HR: 64 (15 Feb 2022 16:14) (53 - 81)  BP: 107/63 (15 Feb 2022 16:14) (91/54 - 118/69)  BP(mean): --  ABP: --  ABP(mean): --  RR: 17 (15 Feb 2022 16:14) (15 - 17)  SpO2: 100% (15 Feb 2022 16:14) (74% - 100%)      PHYSICAL EXAM:  GENERAL: NAD, well-groomed, well-developed, very thin   HEAD:  Atraumatic, Normocephalic  EYES: EOMI, PERRLA, conjunctiva and sclera clear  ENMT: No tonsillar erythema, exudates, or enlargement; Moist mucous membranes, Good dentition, No lesions  NECK: Supple, No JVD,  NERVOUS SYSTEM:  Alert & Oriented X1,   CHEST/LUNG: Clear to percussion bilaterally; No rales, rhonchi, wheezing, or rubs  HEART: Regular rate and rhythm; No murmurs, rubs, or gallops  ABDOMEN: Thin,, Soft, Nontender, Nondistended; Bowel sounds present  EXTREMITIES:  2+ Peripheral Pulses, No clubbing, cyanosis, or edema  SKIN: No rashes or lesions    LABS:                                           11.6   9.20  )-----------( 115      ( 15 Feb 2022 11:59 )             38.2     02-15    141  |  110<H>  |  36<H>  ----------------------------<  70  3.5   |  24  |  1.18    Ca    8.1<L>      15 Feb 2022 11:59    TPro  6.0  /  Alb  1.5<L>  /  TBili  0.5  /  DBili  x   /  AST  54<H>  /  ALT  43  /  AlkPhos  90  02-15    PT/INR - ( 2022 22:42 )   PT: 15.2 sec;   INR: 1.33 ratio         PTT - ( 15 Feb 2022 01:21 )  PTT:92.7 sec  Urinalysis Basic - ( 15 Feb 2022 09:10 )    Color: Yellow / Appearance: Clear / S.015 / pH: x  Gluc: x / Ketone: Negative  / Bili: Negative / Urobili: Negative mg/dL   Blood: x / Protein: 30 mg/dL / Nitrite: Negative   Leuk Esterase: Negative / RBC: 11-25 /HPF / WBC 3-5   Sq Epi: x / Non Sq Epi: x / Bacteria: Few       Patient is a 89y old  Male who presents with a chief complaint of PE, PNA? (2022 08:40)      INTERVAL HPI/OVERNIGHT EVENTS: Pt laying in bed, in no agitation today, opening his eyes, still with nonrebreather   blood culture- growing stap aureus     MEDICATIONS  (STANDING):  dextrose 5% + sodium chloride 0.45%. 1000 milliLiter(s) (60 mL/Hr) IV Continuous <Continuous>  heparin  Infusion.  Unit(s)/Hr (8 mL/Hr) IV Continuous <Continuous>  pantoprazole    Tablet 40 milliGRAM(s) Oral before breakfast  piperacillin/tazobactam IVPB.. 3.375 Gram(s) IV Intermittent <User Schedule>    MEDICATIONS  (PRN):  heparin   Injectable 3500 Unit(s) IV Push every 6 hours PRN For aPTT less than 40  heparin   Injectable 1500 Unit(s) IV Push every 6 hours PRN For aPTT between 40 - 57        Allergies    No Known Allergies    Intolerances        REVIEW OF SYSTEMS:  unable to obtain fully due to mentation     ICU Vital Signs Last 24 Hrs  T(C): 36.3 (15 Feb 2022 16:14), Max: 36.6 (2022 23:42)  T(F): 97.3 (15 Feb 2022 16:14), Max: 97.8 (2022 23:42)  HR: 64 (15 Feb 2022 16:14) (53 - 81)  BP: 107/63 (15 Feb 2022 16:14) (91/54 - 118/69)  BP(mean): --  ABP: --  ABP(mean): --  RR: 17 (15 Feb 2022 16:14) (15 - 17)  SpO2: 100% (15 Feb 2022 16:14) (74% - 100%)      PHYSICAL EXAM:  GENERAL: NAD, well-groomed, well-developed, very thin, opening eyes  HEAD:  Atraumatic, Normocephalic  EYES: EOMI, PERRLA, conjunctiva and sclera clear  ENMT: No tonsillar erythema, exudates, or enlargement; Moist mucous membranes, Good dentition, No lesions  NECK: Supple, No JVD,  NERVOUS SYSTEM:  Alert & Oriented X1,   CHEST/LUNG: Clear to percussion bilaterally; No rales, rhonchi, wheezing, or rubs  HEART: Regular rate and rhythm; No murmurs, rubs, or gallops  ABDOMEN: Thin,, Soft, Nontender, Nondistended; Bowel sounds present  EXTREMITIES:  2+ Peripheral Pulses, No clubbing, cyanosis, or edema, right hallux-possibly dry gangrene   SKIN: No rashes or lesions    LABS:                                                          11.6   9.20  )-----------( 115      ( 15 Feb 2022 11:59 )             38.2     02-15    141  |  110<H>  |  36<H>  ----------------------------<  70  3.5   |  24  |  1.18    Ca    8.1<L>      15 Feb 2022 11:59    TPro  6.0  /  Alb  1.5<L>  /  TBili  0.5  /  DBili  x   /  AST  54<H>  /  ALT  43  /  AlkPhos  90  02-15    PT/INR - ( 2022 22:42 )   PT: 15.2 sec;   INR: 1.33 ratio         PTT - ( 15 Feb 2022 01:21 )  PTT:92.7 sec  Urinalysis Basic - ( 15 Feb 2022 09:10 )    Color: Yellow / Appearance: Clear / S.015 / pH: x  Gluc: x / Ketone: Negative  / Bili: Negative / Urobili: Negative mg/dL   Blood: x / Protein: 30 mg/dL / Nitrite: Negative   Leuk Esterase: Negative / RBC: 11-25 /HPF / WBC 3-5   Sq Epi: x / Non Sq Epi: x / Bacteria: Few

## 2022-02-15 NOTE — PROGRESS NOTE ADULT - ASSESSMENT
89M with HTN, CHF?, Prostate Ca who presents to the ED for dyspnea, wheezing,  wet cough, and decreased appetite for two weeks.  Found to have PE, Pneumonia, Dehydration, hypernatremia and Renal insuffiencey.  Blood cultures are growing staph Aureus.  Trop 185->105  Remains on 100% NRB     ·	Acute dyspnea/failure to thrive  ·	community acquired pneumonia  ·	RLL Pulmonary Embolism  ·	staph Aureus bacteremia   ·	Elevated cardiac enzymes - no concomitant evidence of acute ischemia clinically or on ECG - likely type II MI in setting of CAP/PE.    Plan:  Continue treatment for CAP with antibiotics as per medicine.  On IV heparin for PE, cautious dosing given history of multiple GI bleeds. Will need to segue to DOAC tomorrow and monitor H/H closely.  AMS likely due to underlying CAP, should improve with treatment. Baseline?  Elevated troponins now downtrending, likely type II MI. ASA relatively contraindicated due to recurrent GI bleed in the past. Would obtain echo to see if evidence of WMA that would lead us to consider ischemic substrate. If EF normal and no WMA, would defer any cardiac evaluation or risk stratification for out patient management given age and generalized debility.  Bbl relatively contraindicated due to emphysema, smoking history. Start statins.          89M with HTN, HLD, CHF?, Prostate Ca, EGD which showed a duodenal ulcer in 2015who presents to the ED for dyspnea, wheezing,  wet cough, and decreased appetite for two weeks.  Found to have PE, Pneumonia, Dehydration, hypernatremia and Renal insuffiencey.  Blood cultures are growing staph Aureus.  Trop 185->105  Remains on 100% NRB   remains restless at times with hand mittens for pt safety     ·	Acute dyspnea/failure to thrive  ·	community acquired pneumonia  ·	RLL Pulmonary Embolism  ·	staph Aureus bacteremia   ·	Elevated cardiac enzymes - no concomitant evidence of acute ischemia clinically or on ECG - likely type II MI in setting of CAP/PE.  ·	Hypernatremia /AMNA    Plan:    Continue treatment for CAP with antibiotics as per medicine.  On IV heparin for PE, cautious dosing given history of multiple GI bleeds, monitor H/H closely. Add PPI, Pulm eval  cont supplemental O2 as needed   Elevated troponins now downtrending, likely type II MI. ASA relatively contraindicated due to recurrent GI bleed in the past. Would obtain echo to see if evidence of WMA that would lead us to consider ischemic substrate. If EF normal and no WMA, would defer any cardiac evaluation or risk stratification for out patient management given age and generalized debility. can restart statins  Holding Lasix and aldactone now in setting of hypernatremia and dehydration   Hypernatremia /AMNA now resolved with IV hydration

## 2022-02-15 NOTE — CONSULT NOTE ADULT - SUBJECTIVE AND OBJECTIVE BOX
Patient is a 89y old  Male who presents with a chief complaint of PE, PNA? (2022 13:17)    HPI:  89M with HTN, CHF?, Prostate Ca who presents to the ED for dyspnea, wheezing,  wet cough, and decreased appetite for two weeks.  Found to have PE, Pneumonia, Dehydration, hypernatremia and Renal insuffiencey. Blood cultures are growing staph Aureus.  Not able to provide more details.  Started on IV heparin, IV fluids, NRB mask and antibiotics.     PAST MEDICAL & SURGICAL HISTORY:  HTN (hypertension)    CHF (congestive heart failure)    Prostate CA    History of tonsillectomy    FAMILY HISTORY:  FH: HTN (hypertension)    SOCIAL HISTORY: not able to provide.    Allergies  No Known Allergies    MEDICATIONS  (STANDING):  dextrose 5%. 1000 milliLiter(s) (150 mL/Hr) IV Continuous <Continuous>  heparin  Infusion.  Unit(s)/Hr (8 mL/Hr) IV Continuous <Continuous>  piperacillin/tazobactam IVPB.. 3.375 Gram(s) IV Intermittent <User Schedule>  sodium chloride 0.9% Bolus 500 milliLiter(s) IV Bolus once    MEDICATIONS  (PRN):  heparin   Injectable 3500 Unit(s) IV Push every 6 hours PRN For aPTT less than 40  heparin   Injectable 1500 Unit(s) IV Push every 6 hours PRN For aPTT between 40 - 57    REVIEW OF SYSTEMS: not able to provide.    Vital Signs Last 24 Hrs  T(C): 36.3 (15 Feb 2022 04:35), Max: 36.6 (2022 23:42)  T(F): 97.3 (15 Feb 2022 04:35), Max: 97.8 (2022 23:42)  HR: 75 (15 Feb 2022 04:35) (53 - 76)  BP: 91/54 (15 Feb 2022 04:35) (91/54 - 116/62)  BP(mean): --  RR: 17 (15 Feb 2022 04:35) (14 - 17)  SpO2: 92% (15 Feb 2022 04:35) (74% - 96%)    PHYSICAL EXAM:  GEN:         Awake, responsive. can not be understood.  HEENT:    NRB mask    RESP:       decreased air entry  CVS:          Regular rate and rhythm.   ABD:         Soft, non-tender, non-distended;   SKIN:           Warm and dry. loss of skin turgor.  EXTR:          dry, loss of skin turgor  CNS:            responsive  PSYCH:       responsive.    LABS:                        12.2   11.21 )-----------( 158      ( 2022 06:33 )             40.5         152<H>  |  123<H>  |  54<H>  ----------------------------<  101<H>  3.7   |  23  |  1.46<H>    Ca    8.7      2022 02:58    TPro  7.5  /  Alb  2.1<L>  /  TBili  0.6  /  DBili  x   /  AST  124<H>  /  ALT  62  /  AlkPhos  99      PT/INR - ( 2022 22:42 )   PT: 15.2 sec;   INR: 1.33 ratio      PTT - ( 15 Feb 2022 01:21 )  PTT:92.7 sec   @ 17:42  pH: 7.40  pCO2: 39  pO2: 121  SaO2: 99.4   @ 19:38  pH: 7.46  pCO2: 35  pO2: 85  SaO2: 97.6    Urinalysis Basic - ( 15 Feb 2022 09:10 )    Color: Yellow / Appearance: Clear / S.015 / pH: x  Gluc: x / Ketone: Negative  / Bili: Negative / Urobili: Negative mg/dL   Blood: x / Protein: 30 mg/dL / Nitrite: Negative   Leuk Esterase: Negative / RBC: 11-25 /HPF / WBC 3-5   Sq Epi: x / Non Sq Epi: x / Bacteria: Few    Culture - Blood (collected 22 @ 01:07)  Source: .Blood Blood  Preliminary Report (02-15-22 @ 02:01):    No growth to date.    Culture - Blood (collected 22 @ 01:07)  Source: .Blood Blood  Gram Stain (prelim) (22 @ 19:09):    Growth in aerobic bottle: Gram Positive Cocci in Clusters  Preliminary Report (22 @ 19:09):    Growth in aerobic bottle: Gram Positive Cocci in Clusters    ***Blood Panel PCR results on this specimen are available    approximately 3 hours after the Gram stain result.***    Gram stain, PCR, and/or culture results may not always    correspond due to difference in methodologies.    ************************************************************    This PCR assay was performed by multiplex PCR. This    Assay tests for 66 bacterial and resistance gene targets.    Please refer to the [facility] test directory    at [email or web address] for details.  Organism: Blood Culture PCR (22 @ 20:52)  Organism: Blood Culture PCR (22 @ 20:52)      -  Staphylococcus aureus: Detec Any isolate of Staphylococcus aureus from a blood culture is NOT considered a contaminant.      Method Type: PCR    EKG:  Sinus    RADIOLOGY & ADDITIONAL STUDIES:  < from: CT Angio Chest PE Protocol w/ IV Cont (22 @ 21:41) >  ACC: [id number] EXAM:  CT ANGIO CHEST PULM ART [facility]                          PROCEDURE DATE:  2022      INTERPRETATION:  CLINICAL INFORMATION: Cough, not eating. Pain. Question   pulmonary embolism    COMPARISON: CT abdomen pelvis 10/24/2009. X-ray chest 2022.    CONTRAST/COMPLICATIONS:  IV Contrast: Omnipaque 350  70 cc administered   30 cc discarded  Oral Contrast: NONE  Complications: None reported at time of study completion    PROCEDURE:  CT Angiography of the Chest.  Sagittal and coronal reformats were performed as well as 3D (MIP)   reconstructions.    FINDINGS:    LUNGS AND AIRWAYS: Patent central airways. Mild emphysema. Right lung   patchy consolidations suspicious for infection. Correlate clinically and   follow to resolution.  PLEURA: No pleural effusion.  MEDIASTINUM AND HAIR: No lymphadenopathy.  VESSELS: Pulmonary embolism in the segmental right lower lobe (6:301-303).  HEART: Heart size is normal. No pericardial effusion. No heart strain.  CHEST WALL AND LOWER NECK: Within normal limits.  VISUALIZED UPPER ABDOMEN: Subcentimeter nonobstructing right renal   calcifications..  BONES: Severe thoracolumbar scoliosis with associated degenerative   changes.    IMPRESSION:    Pulmonary embolism in the segmentalright lower lobe (6:301-303).    Right lung patchy consolidations suspicious for infection. Correlate   clinically and follow to resolution.  Findings were discussed with Dr. Noland 2022 10:36 PM by Dr. Schaefer with read back confirmation.    MARKELL SCHAEFER MD; Attending Radiologist  This document has been electronically signed. 2022 10:40PM    ASSESSMENT AND PLAN:  ·	Acute PE.  ·	RLL pneumonia.  ·	Staph Aureus bacteremia.  ·	Dehydration.  ·	Hypernatremia.  ·	Renal Insuffiencey  ·	Prostate CA.  ·	HTN.    On NRB mask, SPO2 not picked up with pulse  oximeter, ABG from yesterday noted.  Continue IV hydration, change to D5 NS, Try to maintain input out put record.  follow electrolytes.  Continue antibiotics, for ID evaluation.  On IV heparin.  Discussed with Dr. Hidalgo.

## 2022-02-15 NOTE — PHYSICAL THERAPY INITIAL EVALUATION ADULT - GENERAL OBSERVATIONS, REHAB EVAL
Chart (EMR) reviewed. Pt seen for wound care assessment. Received supine c HOB elevated, NAD. +O2 via NRB mask, +cardiac monitor donned. +IV intact, +B/L mittens donned. Alert. Ox1. Able to follow commands 50% of the time.

## 2022-02-15 NOTE — PHYSICAL THERAPY INITIAL EVALUATION ADULT - ADDITIONAL COMMENTS
As per son (Eren), pt lives with other son in a pvt house c steps to enter. Has HHA 8 hrs/7 days. Required assistance with all functional mobility and non-ambulatory at baseline. Pt has hospital bed and wheelchair.

## 2022-02-15 NOTE — DISCHARGE NOTE PROVIDER - PROVIDER RX CONTACT NUMBER
[facility]    Levo off x1.5h. In bed. Increased WBC today 31.8 > 14.3 > 23.8. Significant improvement in CXR. 1440 ml after tPA/dornase instillation. Prelim culture shows Alpha Hemolytic Strep. 10 mg Alteplase and 5 mg dornase instilled in Left Pleural space via left chest tube. Chest tube clamped. Will allow to dwell 2 hours. Change positions every 30 mins. Poor ICS    Needs aggressive pulmonary hygiene and ICS. OOB as able. D/w at bedside with India Snell RN. Vital signs:   Visit Vitals    /52    Pulse 67    Temp 97.5 °F (36.4 °C)    Resp 13    Ht 5' 9\" (1.753 m)    Wt 69.9 kg (154 lb 1.6 oz)    SpO2 100%    BMI 22.76 kg/m2     Temp (24hrs), Av.5 °F (36.4 °C), Min:96.8 °F (36 °C), Max:98 °F (36.7 °C)    Admission Weight: Last Weight   Weight: 60 kg (132 lb 4.4 oz) Weight: 69.9 kg (154 lb 1.6 oz)       Nadia Rinne, PA-C [facility]  17, 10:56 AM      CARDIOTHORACIC ATTENDING STAFF NOTE    Patient resting comfortably in bed. Now conversant. States that he feels better, denies pain. No significant events in past 24 hours. Off levophed. Stable VS. Afebrile. Lungs clear to auscultation bilaterally. Heart with RRR. Abdomen soft, benign few bowel sounds. Air leak not present. Drainage from chest tube last 24 hours: 1440 cc, 400 lst 12 hours. Additional does of TPA given this morning. Oxygen saturation of 100% on 2 liters of oxygen via nasal cannula. Hct 34  WBC up to 23.8K  K+ 4  creat up to 2.8 with BUN up to 94. CXR much improved with no pneumothorax and significant clearing of effusion from yesterday's film. Discussed case with PA on our service. Continue present care, chest tube to drainage for now. He needs aggressive nutritional support. Discussed with patient.     Amber Hastings MD [phone]

## 2022-02-15 NOTE — DISCHARGE NOTE PROVIDER - NSDCMRMEDTOKEN_GEN_ALL_CORE_FT
bicalutamide 50 mg oral tablet: 1 tab(s) orally once a day  ferrous sulfate 325 mg (65 mg elemental iron) oral tablet: 1 tab(s) orally 3 times a day  Lasix 40 mg oral tablet: 1 tab(s) orally once a day  pantoprazole 40 mg intravenous injection: 40 milligram(s) intravenous every 12 hours  rosuvastatin 10 mg oral tablet: 1 tab(s) orally once a day (at bedtime)  spironolactone 25 mg oral tablet: 1 tab(s) orally once a day  sucralfate 1 g/10 mL oral suspension: 10 milliliter(s) orally 4 times a day  Tessalon Perles 100 mg oral capsule: 1 cap(s) orally 3 times a day    albuterol 90 mcg/inh inhalation aerosol: 2 puff(s) inhaled every 6 hours  atorvastatin 20 mg oral tablet: 1 tab(s) orally once a day (at bedtime)  bicalutamide 50 mg oral tablet: 1 tab(s) orally once a day  cefepime: 2 gram(s) intravenous every 24 hours until 3/29  morphine: 1 milligram(s) intravenous every 3 hours, As Needed dyspnea or severe pain  pantoprazole 40 mg intravenous injection: 40 milligram(s) intravenous every 12 hours  scopolamine: 1 patch transdermal every 72 hours

## 2022-02-15 NOTE — PROGRESS NOTE ADULT - SUBJECTIVE AND OBJECTIVE BOX
Patient is a 89y old  Male who presents with a chief complaint of dyspnea (15 Feb 2022 11:44)    PAST MEDICAL & SURGICAL HISTORY:  HTN (hypertension)    ?CHF (congestive heart failure)    HLD    gastritis     Prostate CA    Chronic LE venous insufficiency    History of tonsillectomy    INTERVAL HISTORY: + Dyspnea, upper airway gurgling, restless, on 100% NRB    MEDICATIONS  (STANDING):  dextrose 5%. 1000 milliLiter(s) (150 mL/Hr) IV Continuous <Continuous>  heparin  Infusion.  Unit(s)/Hr (8 mL/Hr) IV Continuous <Continuous>  piperacillin/tazobactam IVPB.. 3.375 Gram(s) IV Intermittent <User Schedule>    MEDICATIONS  (PRN):  heparin   Injectable 3500 Unit(s) IV Push every 6 hours PRN For aPTT less than 40  heparin   Injectable 1500 Unit(s) IV Push every 6 hours PRN For aPTT between 40 - 57    Vitals:  T(F): 97.3 (02-15-22 @ 04:35), Max: 97.8 (02-14-22 @ 23:42)  HR: 75 (02-15-22 @ 04:35) (53 - 76)  BP: 91/54 (02-15-22 @ 04:35) (91/54 - 116/62)  RR: 17 (02-15-22 @ 04:35) (15 - 17)  SpO2: 97% (02-15-22 @ 12:21) (74% - 97%)    Weight (kg): 44.4 (02-14 @ 01:14)  BMI (kg/m2): 15.8 (02-14 @ 01:14)    PHYSICAL EXAM:  Neuro: Awake, restless  CV: S1 S2 RRR  Lungs:  diminished to bases   GI: Soft, BS +, ND, NT  Extremities: No edema. Rt great toe tip, Chronic LE venous insufficiency    TELEMETRY: RSR    RADIOLOGY: < from: CT Angio Chest PE Protocol w/ IV Cont (02.13.22 @ 21:41) >  Pulmonary embolism in the segmentalright lower lobe (6:301-303).    Right lung patchy consolidations suspicious for infection  < end of copied text >    DIAGNOSTIC TESTING:    [x ] Echocardiogram: < from: TTE Echo Doppler w/o Cont (02.12.18 @ 15:31) >  Left Ventricle: Normal left ventricular size and wall thicknesses, with   normal systolic and diastolic function.  Left ventricular ejection fraction, by visual estimation, is 60 to 65%.  Right Ventricle: Normal right ventricular size and function.  Left Atrium: The left atrium is normal in size. Normal left atrial size.  Right Atrium: The right atrium is normal in size. Normal right atrial   size.  Pericardium: There is no evidence of pericardial effusion.  Mitral Valve: Structurally normal mitral valve, with normal leaflet   excursion. Thickening and calcification of the anterior and posterior   mitral valve leaflets. Trace mitral valve regurgitation is seen.  Tricuspid Valve: Structurally normal tricuspid valve, with normal leaflet   excursion. The tricuspid valve is normal in structure. Mild tricuspid   regurgitation is visualized.  Aortic Valve: Normal trileaflet aortic valve with normal opening. Mild   aortic valve regurgitation is seen.  Pulmonic Valve: Structurally normal pulmonic valve, with normal leaflet   excursion. Trace pulmonicvalve regurgitation.  Aorta: The aortic root and ascending aorta are structurally normal, with   no evidence of dilitation.  Pulmonary Artery: The main pulmonary artery is normal in size.       Summary:   1. Left ventricular ejection fraction, by visual estimation, is 60 to   65%.   2. Normal left ventricular size and wall thicknesses, with normal   systolic and diastolic function.   3. Normal right ventricular size and function.   4. The left atrium is normal in size.   5. Normal left atrial size.   6. Normal right atrial size.   7. The right atrium is normal in size.   8. There is no evidence of pericardial effusion.   9. Thickening and calcification of the anterior and posterior mitral   valve leaflets.  10. Trace mitral valve regurgitation.  11. Mild aortic, tricuspid regurgitation.  12. Normal trileaflet aortic valve with normal opening.    < end of copied text >    LABS:	 	    CARDIAC MARKERS:  Troponin I, High Sensitivity Result: 109.0 ng/L (02-14 @ 02:58)  Troponin I, High Sensitivity Result: 185.7 ng/L (02-13 @ 19:15)    15 Feb 2022 11:59    141    |  110    |  36     ----------------------------<  70     3.5     |  24     |  1.18   14 Feb 2022 02:58    152    |  123    |  54     ----------------------------<  101    3.7     |  23     |  1.46   13 Feb 2022 19:15    151    |  117    |  59     ----------------------------<  74     4.2     |  27     |  2.01     Ca    8.1        15 Feb 2022 11:59    TPro  6.0    /  Alb  1.5    /  TBili  0.5    /  DBili  x      /  AST  54     /  ALT  43     /  AlkPhos  90     15 Feb 2022 11:59                        11.6   9.20  )-----------( 115      ( 15 Feb 2022 11:59 )             38.2 ,                       12.2   11.21 )-----------( 158      ( 14 Feb 2022 06:33 )             40.5 ,                       13.5   11.45 )-----------( 167      ( 13 Feb 2022 19:15 )             44.4   proBNP: Serum Pro-Brain Natriuretic Peptide: 958 pg/mL (02-13 @ 19:15)    Lipid Profile:     PT/PTT- ( 15 Feb 2022 01:21 )   PT: x    ;  PTT: 92.7 sec     PT/PTT- ( 14 Feb 2022 17:59 )   PT: x    ;  PTT: 99.4 sec    INR: 1.33 ratio (02-13 @ 22:42)           Patient is a 89y old  Male who presents with a chief complaint of dyspnea (15 Feb 2022 11:44)    PAST MEDICAL & SURGICAL HISTORY:  HTN (hypertension)    ?CHF (congestive heart failure)    HLD    gastritis     EGD which showed a duodenal ulcer in 2015    Prostate CA    Chronic LE venous insufficiency    History of tonsillectomy    INTERVAL HISTORY: + Dyspnea, upper airway gurgling, restless, on 100% NRB    MEDICATIONS  (STANDING):  dextrose 5%. 1000 milliLiter(s) (150 mL/Hr) IV Continuous <Continuous>  heparin  Infusion.  Unit(s)/Hr (8 mL/Hr) IV Continuous <Continuous>  piperacillin/tazobactam IVPB.. 3.375 Gram(s) IV Intermittent <User Schedule>    MEDICATIONS  (PRN):  heparin   Injectable 3500 Unit(s) IV Push every 6 hours PRN For aPTT less than 40  heparin   Injectable 1500 Unit(s) IV Push every 6 hours PRN For aPTT between 40 - 57    Vitals:  T(F): 97.3 (02-15-22 @ 04:35), Max: 97.8 (02-14-22 @ 23:42)  HR: 75 (02-15-22 @ 04:35) (53 - 76)  BP: 91/54 (02-15-22 @ 04:35) (91/54 - 116/62)  RR: 17 (02-15-22 @ 04:35) (15 - 17)  SpO2: 97% (02-15-22 @ 12:21) (74% - 97%)    Weight (kg): 44.4 (02-14 @ 01:14)  BMI (kg/m2): 15.8 (02-14 @ 01:14)    PHYSICAL EXAM:  Neuro: Awake, restless  CV: S1 S2 RRR  Lungs:  diminished to bases   GI: Soft, BS +, ND, NT  Extremities: No edema. Rt great toe tip, Chronic LE venous insufficiency    TELEMETRY: RSR    RADIOLOGY: < from: CT Angio Chest PE Protocol w/ IV Cont (02.13.22 @ 21:41) >  Pulmonary embolism in the segmentalright lower lobe (6:301-303).    Right lung patchy consolidations suspicious for infection  < end of copied text >    DIAGNOSTIC TESTING:    [x ] Echocardiogram: < from: TTE Echo Doppler w/o Cont (02.12.18 @ 15:31) >  Left Ventricle: Normal left ventricular size and wall thicknesses, with   normal systolic and diastolic function.  Left ventricular ejection fraction, by visual estimation, is 60 to 65%.  Right Ventricle: Normal right ventricular size and function.  Left Atrium: The left atrium is normal in size. Normal left atrial size.  Right Atrium: The right atrium is normal in size. Normal right atrial   size.  Pericardium: There is no evidence of pericardial effusion.  Mitral Valve: Structurally normal mitral valve, with normal leaflet   excursion. Thickening and calcification of the anterior and posterior   mitral valve leaflets. Trace mitral valve regurgitation is seen.  Tricuspid Valve: Structurally normal tricuspid valve, with normal leaflet   excursion. The tricuspid valve is normal in structure. Mild tricuspid   regurgitation is visualized.  Aortic Valve: Normal trileaflet aortic valve with normal opening. Mild   aortic valve regurgitation is seen.  Pulmonic Valve: Structurally normal pulmonic valve, with normal leaflet   excursion. Trace pulmonicvalve regurgitation.  Aorta: The aortic root and ascending aorta are structurally normal, with   no evidence of dilitation.  Pulmonary Artery: The main pulmonary artery is normal in size.       Summary:   1. Left ventricular ejection fraction, by visual estimation, is 60 to   65%.   2. Normal left ventricular size and wall thicknesses, with normal   systolic and diastolic function.   3. Normal right ventricular size and function.   4. The left atrium is normal in size.   5. Normal left atrial size.   6. Normal right atrial size.   7. The right atrium is normal in size.   8. There is no evidence of pericardial effusion.   9. Thickening and calcification of the anterior and posterior mitral   valve leaflets.  10. Trace mitral valve regurgitation.  11. Mild aortic, tricuspid regurgitation.  12. Normal trileaflet aortic valve with normal opening.    < end of copied text >    LABS:	 	    CARDIAC MARKERS:  Troponin I, High Sensitivity Result: 109.0 ng/L (02-14 @ 02:58)  Troponin I, High Sensitivity Result: 185.7 ng/L (02-13 @ 19:15)    15 Feb 2022 11:59    141    |  110    |  36     ----------------------------<  70     3.5     |  24     |  1.18   14 Feb 2022 02:58    152    |  123    |  54     ----------------------------<  101    3.7     |  23     |  1.46   13 Feb 2022 19:15    151    |  117    |  59     ----------------------------<  74     4.2     |  27     |  2.01     Ca    8.1        15 Feb 2022 11:59    TPro  6.0    /  Alb  1.5    /  TBili  0.5    /  DBili  x      /  AST  54     /  ALT  43     /  AlkPhos  90     15 Feb 2022 11:59                        11.6   9.20  )-----------( 115      ( 15 Feb 2022 11:59 )             38.2 ,                       12.2   11.21 )-----------( 158      ( 14 Feb 2022 06:33 )             40.5 ,                       13.5   11.45 )-----------( 167      ( 13 Feb 2022 19:15 )             44.4   proBNP: Serum Pro-Brain Natriuretic Peptide: 958 pg/mL (02-13 @ 19:15)    Lipid Profile:     PT/PTT- ( 15 Feb 2022 01:21 )   PT: x    ;  PTT: 92.7 sec     PT/PTT- ( 14 Feb 2022 17:59 )   PT: x    ;  PTT: 99.4 sec    INR: 1.33 ratio (02-13 @ 22:42)

## 2022-02-15 NOTE — PHYSICAL THERAPY INITIAL EVALUATION ADULT - MODALITIES TREATMENT COMMENTS
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

## 2022-02-15 NOTE — DISCHARGE NOTE PROVIDER - DETAILS OF MALNUTRITION DIAGNOSIS/DIAGNOSES
This patient has been assessed with a concern for Malnutrition and was treated during this hospitalization for the following Nutrition diagnosis/diagnoses:     -  02/16/2022: Severe protein-calorie malnutrition   -  02/16/2022: Underweight (BMI < 19)

## 2022-02-15 NOTE — CONSULT NOTE ADULT - SUBJECTIVE AND OBJECTIVE BOX
SANDRA ROCHE  MRN-[id number]        Patient is a 89y old  Male who presents with a chief complaint of PE, PNA? (2022 12:41)      HPI:  Patient is a 89M with a PMH of CHF?, prostate Ca who presents to the ED for dyspnea.  Patient currently AAOx1, unable to provide history.  Patient reportedly noted to have decreased appetite and wheezing by his HHA today who called EMS.  No current complaints.  SpO2 currently 96 on 4L via NC.  Vitals stable, labs show mild leukocytosis.  Will admit to tele. (2022 00:27)      ID consulted for workup and antibiotic management     PAST MEDICAL & SURGICAL HISTORY:  HTN (hypertension)    CHF (congestive heart failure)    Prostate CA    History of tonsillectomy        Allergies  No Known Allergies        ANTIMICROBIALS:  ceFAZolin   IVPB 2000 every 12 hours      MEDICATIONS  (STANDING):  azithromycin  IVPB   255 mL/Hr IV Intermittent (22 @ 19:38)    azithromycin  IVPB   255 mL/Hr IV Intermittent (22 @ 06:18)    cefTRIAXone   IVPB   100 mL/Hr IV Intermittent (22 @ 05:07)    cefTRIAXone   IVPB   100 mL/Hr IV Intermittent (22 @ 19:37)    piperacillin/tazobactam IVPB.   200 mL/Hr IV Intermittent (22 @ 18:03)    piperacillin/tazobactam IVPB..   25 mL/Hr IV Intermittent (22 @ 05:20)   25 mL/Hr IV Intermittent (02-15-22 @ 18:22)   25 mL/Hr IV Intermittent (02-15-22 @ 05:28)        OTHER MEDS: MEDICATIONS  (STANDING):  enoxaparin Injectable 50 daily  pantoprazole    Tablet 40 before breakfast      SOCIAL HISTORY:     unknown   FAMILY HISTORY:  FH: HTN (hypertension)        REVIEW OF SYSTEMS  [x  ] ROS unobtainable because:  AMS   [  ] All other systems negative except as noted below:	    Constitutional:  [ ] fever [ ] chills  [ ] weight loss  [ ] weakness  Skin:  [ ] rash [ ] phlebitis	  Eyes: [ ] icterus [ ] pain  [ ] discharge	  ENMT: [ ] sore throat  [ ] thrush [ ] ulcers [ ] exudates  Respiratory: [ ] dyspnea [ ] hemoptysis [ ] cough [ ] sputum	  Cardiovascular:  [ ] chest pain [ ] palpitations [ ] edema	  Gastrointestinal:  [ ] nausea [ ] vomiting [ ] diarrhea [ ] constipation [ ] pain	  Genitourinary:  [ ] dysuria [ ] frequency [ ] hematuria [ ] discharge [ ] flank pain  [ ] incontinence  Musculoskeletal:  [ ] myalgias [ ] arthralgias [ ] arthritis  [ ] back pain  Neurological:  [ ] headache [ ] seizures  [ ] confusion/altered mental status  Psychiatric:  [ ] anxiety [ ] depression	  Hematology/Lymphatics:  [ ] lymphadenopathy  Endocrine:  [ ] adrenal [ ] thyroid  Allergic/Immunologic:	 [ ] transplant [ ] seasonal    Vital Signs Last 24 Hrs  T(F): 97.4 (22 @ 11:15), Max: 97.8 (22 @ 04:42)    Vital Signs Last 24 Hrs  HR: 67 (22 @ 11:15) (58 - 80)  BP: 122/67 (22 @ 11:15) (107/63 - 125/84)  RR: 18 (22 @ 11:15)  SpO2: 92% (22 @ 11:15) (92% - 100%)  Wt(kg): --    PHYSICAL EXAM:  Constitutional: non-toxic, no distress was on venti mask  HEAD/EYES: anicteric, no conjunctival injection  ENT:  supple, no thrush  Cardiovascular:   normal S1, S2, no murmur, no edema  Respiratory:  clear BS bilaterally, no wheezes, no rales  GI:  soft, non-tender, normal bowel sounds  Musculoskeletal:  no synovitis, normal ROM, right hallux dry gangrene   Neurologic: awake and alert but difficult to speak and only able to say his name, follows simple commands   Skin:  no rash, no erythema, no phlebitis  Heme/Onc: no cervical lymphadenopathy   Psychiatric:  appropriate mood          WBC Count: 8.90 K/uL ( @ 10:26)  WBC Count: 9.20 K/uL (02-15 @ 11:59)  WBC Count: 11.21 K/uL ( @ 06:33)  WBC Count: 11.45 K/uL ( @ 19:15)      Auto Neutrophil %: 86.0 % *H* (22 @ 19:15)  Auto Neutrophil #: 10.65 K/uL *H* (22 @ 19:15)                            12.4   8.90  )-----------( 142      ( 2022 10:26 )             40.2           139  |  110<H>  |  22  ----------------------------<  54<LL>  4.2   |  23  |  1.07    Ca    8.1<L>      2022 10:26  Mg     2.2         TPro  6.0  /  Alb  1.5<L>  /  TBili  0.5  /  DBili  x   /  AST  54<H>  /  ALT  43  /  AlkPhos  90  02-15      Creatinine Trend: 1.07<--, 1.18<--, 1.46<--, 2.01<--    Lactate, Blood: 6.1 mmol/L (22 @ 10:26)  Lactate, Blood: 4.6 mmol/L (02-15-22 @ 15:53)  Lactate, Blood: 5.7 mmol/L (02-15-22 @ 11:59)  Lactate, Blood: 5.4 mmol/L (22 @ 21:45)  Lactate, Blood: 6.1 mmol/L (22 @ 15:31)      Urinalysis Basic - ( 15 Feb 2022 09:10 )    Color: Yellow / Appearance: Clear / S.015 / pH: x  Gluc: x / Ketone: Negative  / Bili: Negative / Urobili: Negative mg/dL   Blood: x / Protein: 30 mg/dL / Nitrite: Negative   Leuk Esterase: Negative / RBC: 11-25 /HPF / WBC 3-5   Sq Epi: x / Non Sq Epi: x / Bacteria: Few        MICROBIOLOGY:    Culture - Blood (collected 22 @ 01:07)  Source: .Blood Blood  Preliminary Report (02-15-22 @ 02:01):    No growth to date.    Culture - Blood (collected 22 @ 01:07)  Source: .Blood Blood  Gram Stain (prelim) (02-15-22 @ 22:58):    Growth in aerobic bottle: Gram Positive Cocci in Clusters    Growth in anaerobic bottle: Gram Positive Cocci in Clusters  Preliminary Report (02-15-22 @ 22:59):    Growth in aerobic bottle: Staphylococcus aureus    Growth in anaerobic bottle: Gram Positive Cocci in Clusters  Organism: Blood Culture PCR (22 @ 20:52)      -  Staphylococcus aureus: Detec Any isolate of Staphylococcus aureus from a blood culture is NOT considered a contaminant.      Method Type: PCR      D-Dimer Assay, Quantitative: 6004 ()      SARS-CoV-2 Result: NotDetec (22 @ 19:15)      RADIOLOGY:  < from: CT Angio Chest PE Protocol w/ IV Cont (22 @ 21:41) >  IMPRESSION:    Pulmonary embolism in the segmentalright lower lobe (6:301-303).    Right lung patchy consolidations suspicious for infection. Correlate   clinically and follow to resolution.  Findings were discussed with Dr. Noland 2022 10:36 PM by Dr. Arcos with read back confirmation.

## 2022-02-15 NOTE — DISCHARGE NOTE PROVIDER - NSDCFUADDINST_GEN_ALL_CORE_FT
Podiatry Discharge Instructions:  Follow up: Please follow up with Dr. Riojas within 1 week of discharge from the hospital, please call [phone] for appointment and discuss that you recently were seen in the hospital.  Wound Care: Please apply betadine to right big toe and right heel followed by 4x4 loc and kerlix daily.  Weight bearing: Please offload bilateral heels with z flow booties.  Antibiotics: Please continue as instructed. Ensure TID    Podiatry Discharge Instructions:  Follow up: Please follow up with Dr. Riojas within 1 week of discharge from the hospital, please call [phone] for appointment and discuss that you recently were seen in the hospital.  Wound Care: Please apply betadine to right big toe and right heel followed by 4x4 guaze and kerlix daily.  Weight bearing: Please offload bilateral heels with z flow booties.  Antibiotics: Please continue as instructed.    It is important to see your primary physician as well as the physicians noted below within the next week to perform a comprehensive medical review.  Call their offices for an appointment as soon as you leave the hospital.  You will also need to see them for renewal of your medications.  If you do not have a primary physician, contact the [facility] Physician Referral Service at [phone].  To obtain your results, you can access the [email or web address] Patient Portal at [email or web address].  Your medical issues appear to be stable at this time, but if your symptoms recur or worsen, contact your physicians and/or return to the hospital if necessary.  If you encounter any issues or questions with your medication, call your physicians before stopping the medication.  Do not drive.  Limit your diet to 2 grams of sodium daily.  Podiatry Discharge Instructions:  Follow up: Please follow up with Dr. Riojas within 1 week of discharge from the hospital, please call [phone] for appointment and discuss that you recently were seen in the hospital.  Wound Care: Please apply betadine to right big toe and right heel followed by 4x4 guhaven and kerlix daily.  Weight bearing: Please offload bilateral heels with z flow booties.  Antibiotics: Please continue as instructed.    It is important to see your primary physician as well as the physicians noted below within the next week to perform a comprehensive medical review.  Call their offices for an appointment as soon as you leave the hospital.  You will also need to see them for renewal of your medications.  If you do not have a primary physician, contact the [facility] Physician Referral Service at [phone].  To obtain your results, you can access the [facility] Patient Portal at [email or web address].  Your medical issues appear to be stable at this time, but if your symptoms recur or worsen, contact your physicians and/or return to the hospital if necessary.  If you encounter any issues or questions with your medication, call your physicians before stopping the medication.  Do not drive.  Limit your diet to 2 grams of sodium daily.

## 2022-02-15 NOTE — CONSULT NOTE ADULT - SUBJECTIVE AND OBJECTIVE BOX
Podiatry pager #: [phone]/ [phone]    Patient is a 89y old  Male who presents with a chief complaint of PE, PNA? (15 Feb 2022 17:37)      HPI:  Patient is a 89M with a PMH of CHF?, prostate Ca who presents to the ED for dyspnea.  Patient currently AAOx1, unable to provide history.  Patient reportedly noted to have decreased appetite and wheezing by his HHA today who called EMS.  No current complaints.  SpO2 currently 96 on 4L via NC.  Vitals stable, labs show mild leukocytosis.  Will admit to tele. (14 Feb 2022 00:27)      PAST MEDICAL & SURGICAL HISTORY:  HTN (hypertension)    CHF (congestive heart failure)    Prostate CA    History of tonsillectomy        MEDICATIONS  (STANDING):  dextrose 5% + sodium chloride 0.45%. 1000 milliLiter(s) (60 mL/Hr) IV Continuous <Continuous>  heparin  Infusion.  Unit(s)/Hr (8 mL/Hr) IV Continuous <Continuous>  pantoprazole    Tablet 40 milliGRAM(s) Oral before breakfast  piperacillin/tazobactam IVPB.. 3.375 Gram(s) IV Intermittent <User Schedule>    MEDICATIONS  (PRN):  heparin   Injectable 3500 Unit(s) IV Push every 6 hours PRN For aPTT less than 40  heparin   Injectable 1500 Unit(s) IV Push every 6 hours PRN For aPTT between 40 - 57      Allergies    No Known Allergies    Intolerances        VITALS:    Vital Signs Last 24 Hrs  T(C): 36.3 (15 Feb 2022 16:14), Max: 36.6 (14 Feb 2022 23:42)  T(F): 97.3 (15 Feb 2022 16:14), Max: 97.8 (14 Feb 2022 23:42)  HR: 64 (15 Feb 2022 16:14) (53 - 81)  BP: 107/63 (15 Feb 2022 16:14) (91/54 - 118/69)  BP(mean): --  RR: 17 (15 Feb 2022 16:14) (15 - 17)  SpO2: 100% (15 Feb 2022 16:14) (74% - 100%)    LABS:                          11.6   9.20  )-----------( 115      ( 15 Feb 2022 11:59 )             38.2       02-15    141  |  110<H>  |  36<H>  ----------------------------<  70  3.5   |  24  |  1.18    Ca    8.1<L>      15 Feb 2022 11:59    TPro  6.0  /  Alb  1.5<L>  /  TBili  0.5  /  DBili  x   /  AST  54<H>  /  ALT  43  /  AlkPhos  90  02-15      CAPILLARY BLOOD GLUCOSE          PT/INR - ( 13 Feb 2022 22:42 )   PT: 15.2 sec;   INR: 1.33 ratio         PTT - ( 15 Feb 2022 01:21 )  PTT:92.7 sec    LOWER EXTREMITY PHYSICAL EXAM:    Vasular: DP/PT NP, B/L, Temperature gradient cool to cool, B/L.   Neuro: Epicritic sensation diminished to the level of digits B/L.  Musculoskeletal/Ortho: unremarkable  Skin: Right hallux distal eschar with no periwound erythema, no fluctuance, no malodor, no acute signs of infection. Right heel eschar, dry and stable with intact borders, no erythema, no acute signs of infection.     RADIOLOGY & ADDITIONAL STUDIES:     Podiatry pager #: [phone]/ [phone]    Patient is a 89y old  Male who presents with a chief complaint of PE, PNA? (15 Feb 2022 17:37)      HPI:  Patient is a 89M with a PMH of CHF?, prostate Ca who presents to the ED for dyspnea.  Patient currently AAOx1, unable to provide history.  Patient reportedly noted to have decreased appetite and wheezing by his HHA today who called EMS. Podiatry consulted to evaluate and to give input on if the feet are possible source of the patients bacteremia. No current complaints.  SpO2 currently 96 on 4L via NC.  Vitals stable, labs show mild leukocytosis.  Will admit to tele. (14 Feb 2022 00:27)      PAST MEDICAL & SURGICAL HISTORY:  HTN (hypertension)    CHF (congestive heart failure)    Prostate CA    History of tonsillectomy        MEDICATIONS  (STANDING):  dextrose 5% + sodium chloride 0.45%. 1000 milliLiter(s) (60 mL/Hr) IV Continuous <Continuous>  heparin  Infusion.  Unit(s)/Hr (8 mL/Hr) IV Continuous <Continuous>  pantoprazole    Tablet 40 milliGRAM(s) Oral before breakfast  piperacillin/tazobactam IVPB.. 3.375 Gram(s) IV Intermittent <User Schedule>    MEDICATIONS  (PRN):  heparin   Injectable 3500 Unit(s) IV Push every 6 hours PRN For aPTT less than 40  heparin   Injectable 1500 Unit(s) IV Push every 6 hours PRN For aPTT between 40 - 57      Allergies    No Known Allergies    Intolerances        VITALS:    Vital Signs Last 24 Hrs  T(C): 36.3 (15 Feb 2022 16:14), Max: 36.6 (14 Feb 2022 23:42)  T(F): 97.3 (15 Feb 2022 16:14), Max: 97.8 (14 Feb 2022 23:42)  HR: 64 (15 Feb 2022 16:14) (53 - 81)  BP: 107/63 (15 Feb 2022 16:14) (91/54 - 118/69)  BP(mean): --  RR: 17 (15 Feb 2022 16:14) (15 - 17)  SpO2: 100% (15 Feb 2022 16:14) (74% - 100%)    LABS:                          11.6   9.20  )-----------( 115      ( 15 Feb 2022 11:59 )             38.2       02-15    141  |  110<H>  |  36<H>  ----------------------------<  70  3.5   |  24  |  1.18    Ca    8.1<L>      15 Feb 2022 11:59    TPro  6.0  /  Alb  1.5<L>  /  TBili  0.5  /  DBili  x   /  AST  54<H>  /  ALT  43  /  AlkPhos  90  02-15      CAPILLARY BLOOD GLUCOSE          PT/INR - ( 13 Feb 2022 22:42 )   PT: 15.2 sec;   INR: 1.33 ratio         PTT - ( 15 Feb 2022 01:21 )  PTT:92.7 sec    LOWER EXTREMITY PHYSICAL EXAM:    Vasular: DP/PT NP, B/L, Temperature gradient cool to cool, B/L.   Neuro: Epicritic sensation diminished to the level of digits B/L.  Musculoskeletal/Ortho: unremarkable  Skin: Right hallux distal eschar with no periwound erythema, no fluctuance, no malodor, no acute signs of infection. Right heel eschar, dry and stable with intact borders, no erythema, no acute signs of infection.     RADIOLOGY & ADDITIONAL STUDIES:

## 2022-02-15 NOTE — DISCHARGE NOTE PROVIDER - HOSPITAL COURSE
89M with a PMH of CHF?, prostate Ca who presents to the ED for dyspnea.  Treated for pneumonia, MSSA bacteremia.  Hospital course complicated by elevated troponin, Acute pulmonary embolism, and acute blood loss anemia.  Pt's son opted for conservative management, citing pt's multiple comorbid conditions.  Pt was accepted to hospice.    # Acute Respiratory Failure with Hypoxia / COPD / Acute Pulmonary Embolism - not on anticoagulation due to acute blood loss.  Conservative management / hospice per son's wishes.  - confirmed - DNR and DNI.  Morphine prn dyspnea.  # RLL Pneumonia, MSSA bacteremia - Per ID recommendations, IV Cefepime until 3/29, can stop if consistent with family's wishes.  # Elevated Troponin - no further workup per son.  # Functional Quadriplegia - supportive care.  # Failure to thrive / Prostate cancer - supportive care  # Acute Blood Loss Anemia - pt's son had refused EGD.  PPI.  Supportive care.    Plan of care d/w Son- Eren- [phone].  Confirmed above.   Stable for d/c to Inpatient Hospice.     Disposition: Stable for discharge.  followup discussed.  Total time spent on discharge is  45 minutes.

## 2022-02-16 DIAGNOSIS — R62.7 ADULT FAILURE TO THRIVE: ICD-10-CM

## 2022-02-16 DIAGNOSIS — R78.81 BACTEREMIA: ICD-10-CM

## 2022-02-16 DIAGNOSIS — Z51.5 ENCOUNTER FOR PALLIATIVE CARE: ICD-10-CM

## 2022-02-16 DIAGNOSIS — R53.2 FUNCTIONAL QUADRIPLEGIA: ICD-10-CM

## 2022-02-16 LAB
-  AMPICILLIN/SULBACTAM: SIGNIFICANT CHANGE UP
-  CEFAZOLIN: SIGNIFICANT CHANGE UP
-  CLINDAMYCIN: SIGNIFICANT CHANGE UP
-  ERYTHROMYCIN: SIGNIFICANT CHANGE UP
-  GENTAMICIN: SIGNIFICANT CHANGE UP
-  OXACILLIN: SIGNIFICANT CHANGE UP
-  RIFAMPIN: SIGNIFICANT CHANGE UP
-  TETRACYCLINE: SIGNIFICANT CHANGE UP
-  TRIMETHOPRIM/SULFAMETHOXAZOLE: SIGNIFICANT CHANGE UP
-  VANCOMYCIN: SIGNIFICANT CHANGE UP
ANION GAP SERPL CALC-SCNC: 6 MMOL/L — SIGNIFICANT CHANGE UP (ref 5–17)
APTT BLD: 90 SEC — HIGH (ref 27.5–35.5)
BUN SERPL-MCNC: 22 MG/DL — SIGNIFICANT CHANGE UP (ref 7–23)
CALCIUM SERPL-MCNC: 8.1 MG/DL — LOW (ref 8.5–10.1)
CHLORIDE SERPL-SCNC: 110 MMOL/L — HIGH (ref 96–108)
CO2 SERPL-SCNC: 23 MMOL/L — SIGNIFICANT CHANGE UP (ref 22–31)
CREAT SERPL-MCNC: 1.07 MG/DL — SIGNIFICANT CHANGE UP (ref 0.5–1.3)
CULTURE RESULTS: SIGNIFICANT CHANGE UP
GLUCOSE BLDC GLUCOMTR-MCNC: 11 MG/DL — CRITICAL LOW (ref 70–99)
GLUCOSE BLDC GLUCOMTR-MCNC: 156 MG/DL — HIGH (ref 70–99)
GLUCOSE SERPL-MCNC: 54 MG/DL — CRITICAL LOW (ref 70–99)
GRAM STN FLD: SIGNIFICANT CHANGE UP
HCT VFR BLD CALC: 40.2 % — SIGNIFICANT CHANGE UP (ref 39–50)
HGB BLD-MCNC: 12.4 G/DL — LOW (ref 13–17)
LACTATE SERPL-SCNC: 3.3 MMOL/L — HIGH (ref 0.7–2)
LACTATE SERPL-SCNC: 5.3 MMOL/L — CRITICAL HIGH (ref 0.7–2)
LACTATE SERPL-SCNC: 6.1 MMOL/L — CRITICAL HIGH (ref 0.7–2)
MAGNESIUM SERPL-MCNC: 2.2 MG/DL — SIGNIFICANT CHANGE UP (ref 1.6–2.6)
MCHC RBC-ENTMCNC: 27.5 PG — SIGNIFICANT CHANGE UP (ref 27–34)
MCHC RBC-ENTMCNC: 30.8 G/DL — LOW (ref 32–36)
MCV RBC AUTO: 89.1 FL — SIGNIFICANT CHANGE UP (ref 80–100)
METHOD TYPE: SIGNIFICANT CHANGE UP
NRBC # BLD: 0 /100 WBCS — SIGNIFICANT CHANGE UP (ref 0–0)
ORGANISM # SPEC MICROSCOPIC CNT: SIGNIFICANT CHANGE UP
PLATELET # BLD AUTO: 142 K/UL — LOW (ref 150–400)
POTASSIUM SERPL-MCNC: 4.2 MMOL/L — SIGNIFICANT CHANGE UP (ref 3.5–5.3)
POTASSIUM SERPL-SCNC: 4.2 MMOL/L — SIGNIFICANT CHANGE UP (ref 3.5–5.3)
RBC # BLD: 4.51 M/UL — SIGNIFICANT CHANGE UP (ref 4.2–5.8)
RBC # FLD: 15.8 % — HIGH (ref 10.3–14.5)
SODIUM SERPL-SCNC: 139 MMOL/L — SIGNIFICANT CHANGE UP (ref 135–145)
SPECIMEN SOURCE: SIGNIFICANT CHANGE UP
WBC # BLD: 8.9 K/UL — SIGNIFICANT CHANGE UP (ref 3.8–10.5)
WBC # FLD AUTO: 8.9 K/UL — SIGNIFICANT CHANGE UP (ref 3.8–10.5)

## 2022-02-16 PROCEDURE — 99233 SBSQ HOSP IP/OBS HIGH 50: CPT

## 2022-02-16 PROCEDURE — 99497 ADVNCD CARE PLAN 30 MIN: CPT | Mod: 25

## 2022-02-16 PROCEDURE — 73630 X-RAY EXAM OF FOOT: CPT | Mod: 26,RT

## 2022-02-16 PROCEDURE — 99223 1ST HOSP IP/OBS HIGH 75: CPT

## 2022-02-16 PROCEDURE — 99232 SBSQ HOSP IP/OBS MODERATE 35: CPT

## 2022-02-16 RX ORDER — CEFAZOLIN SODIUM 1 G
2000 VIAL (EA) INJECTION EVERY 12 HOURS
Refills: 0 | Status: DISCONTINUED | OUTPATIENT
Start: 2022-02-16 | End: 2022-02-19

## 2022-02-16 RX ORDER — ENOXAPARIN SODIUM 100 MG/ML
50 INJECTION SUBCUTANEOUS DAILY
Refills: 0 | Status: DISCONTINUED | OUTPATIENT
Start: 2022-02-16 | End: 2022-02-23

## 2022-02-16 RX ORDER — ENOXAPARIN SODIUM 100 MG/ML
45 INJECTION SUBCUTANEOUS
Refills: 0 | Status: DISCONTINUED | OUTPATIENT
Start: 2022-02-16 | End: 2022-02-16

## 2022-02-16 RX ORDER — DEXTROSE 50 % IN WATER 50 %
25 SYRINGE (ML) INTRAVENOUS ONCE
Refills: 0 | Status: COMPLETED | OUTPATIENT
Start: 2022-02-16 | End: 2022-02-16

## 2022-02-16 RX ADMIN — SODIUM CHLORIDE 60 MILLILITER(S): 9 INJECTION, SOLUTION INTRAVENOUS at 23:58

## 2022-02-16 RX ADMIN — ENOXAPARIN SODIUM 50 MILLIGRAM(S): 100 INJECTION SUBCUTANEOUS at 13:13

## 2022-02-16 RX ADMIN — Medication 25 GRAM(S): at 11:21

## 2022-02-16 RX ADMIN — SODIUM CHLORIDE 60 MILLILITER(S): 9 INJECTION, SOLUTION INTRAVENOUS at 10:31

## 2022-02-16 RX ADMIN — Medication 100 MILLIGRAM(S): at 17:58

## 2022-02-16 RX ADMIN — HEPARIN SODIUM 700 UNIT(S)/HR: 5000 INJECTION INTRAVENOUS; SUBCUTANEOUS at 07:20

## 2022-02-16 RX ADMIN — PIPERACILLIN AND TAZOBACTAM 25 GRAM(S): 4; .5 INJECTION, POWDER, LYOPHILIZED, FOR SOLUTION INTRAVENOUS at 05:20

## 2022-02-16 NOTE — CONSULT NOTE ADULT - ATTENDING COMMENTS
pt seen and examined with NP Viktoria, agree with above assessment and plan- adjustments to note made where needed. 89 y/o with PE, PNA likely aspiration in nature, MSSA bacteremia, cachexia and failure to thrive. Phone conversation with son Eren, myself and NP Viktoria yielded clarification of goals. MOLST re-written and placed in chart.   D/w Dr. Hidalgo

## 2022-02-16 NOTE — CONSULT NOTE ADULT - TREATMENT GUIDELINES
DNR Order
Implemented All Fall Risk Interventions:  [place] to call system. Call bell, personal items and telephone within reach. Instruct patient to call for assistance. Room bathroom lighting operational. Non-slip footwear when patient is off stretcher. Physically safe environment: no spills, clutter or unnecessary equipment. Stretcher in lowest position, wheels locked, appropriate side rails in place. Provide visual cue, wrist band, yellow gown, etc. Monitor gait and stability. Monitor for mental status changes and reorient to person, place, and time. Review medications for side effects contributing to fall risk. Reinforce activity limits and safety measures with patient and family.

## 2022-02-16 NOTE — CONSULT NOTE ADULT - SUBJECTIVE AND OBJECTIVE BOX
HPI:  Patient is a 89M with a PMH of CHF?, prostate Ca who presents to the ED for dyspnea.  Patient currently AAOx1, unable to provide history.  Patient reportedly noted to have decreased appetite and wheezing by his HHA today who called EMS.  No current complaints.  SpO2 currently 96 on 4L via NC.  Vitals stable, labs show mild leukocytosis.  Will admit to tele. (2022 00:27)    PERTINENT PM/SXH:   HTN (hypertension)    CHF (congestive heart failure)    Prostate CA      History of tonsillectomy      FAMILY HISTORY:  FH: HTN (hypertension)      ITEMS NOT CHECKED ARE NOT PRESENT    SOCIAL HISTORY:   Significant other/partner:  [ ]  Children: Eren  [ ]  Scientologist/Spirituality:Jain  Substance hx:  [ ]   Tobacco hx:  [ ]   Alcohol hx: [ ]   Home Opioid hx:  [ ] I-Stop Reference No: Reference #: [id number]  Living Situation: [ ]Home  [ ]Long term care  [ ]Rehab [ ]Other    ADVANCE DIRECTIVES:    DNR  MOLST  [ ]  Living Will  [ ]   DECISION MAKER(s):  [ ] Health Care Proxy(s)  [x ] Surrogate(s)  [ ] Guardian           Name(s): Phone Number(s): Eren (son) [phone]    BASELINE (I)ADL(s) (prior to admission):  [place]: [ ]Total  [ ] Moderate [ ]Dependent    Allergies    No Known Allergies    Intolerances    MEDICATIONS  (STANDING):  dextrose 5% + sodium chloride 0.45%. 1000 milliLiter(s) (60 mL/Hr) IV Continuous <Continuous>  enoxaparin Injectable 45 milliGRAM(s) SubCutaneous two times a day  pantoprazole    Tablet 40 milliGRAM(s) Oral before breakfast  piperacillin/tazobactam IVPB.. 3.375 Gram(s) IV Intermittent <User Schedule>    MEDICATIONS  (PRN):    PRESENT SYMPTOMS: [ ]Unable to obtain due to poor mentation   Source if other than patient:  [ ]Family   [ ]Team     Pain: [ ] yes [x ] no  QOL impact -   Location -                    Aggravating factors -  Quality -  Radiation -  Timing-  Severity (0-10 scale):  Minimal acceptable level (0-10 scale):     PAIN AD Score:     [email or web address].[email or web address]/cog/painad.pdf (press ctrl +  left click to view)    Dyspnea:                           [ ]Mild [ ]Moderate [ ]Severe  Anxiety:                             [ ]Mild [ ]Moderate [ ]Severe  Fatigue:                             [ ]Mild [ ]Moderate [ ]Severe  Nausea:                             [ ]Mild [ ]Moderate [ ]Severe  Loss of appetite:              [ ]Mild [ ]Moderate [ ]Severe  Constipation:                    [ ]Mild [ ]Moderate [ ]Severe    Other Symptoms:  [x ]All other review of systems negative     Karnofsky Performance Score/Palliative Performance Status Version 2:         20%    http://[facility].org/files/news/palliative_performance_scale_ppsv2.pdf  PHYSICAL EXAM:  Vital Signs Last 24 Hrs  T(C): 36.3 (2022 11:15), Max: 36.4 (15 Feb 2022 23:41)  T(F): 97.4 (2022 11:15), Max: 97.6 (15 Feb 2022 23:41)  HR: 67 (2022 11:15) (64 - 81)  BP: 122/67 (2022 11:15) (107/63 - 125/84)  BP(mean): --  RR: 18 (2022 11:15) (17 - 18)  SpO2: 92% (2022 11:15) (92% - 100%) I&O's Summary    15 Feb 2022 07:01  -  2022 07:00  --------------------------------------------------------  IN: 1437 mL / OUT: 0 mL / NET: 1437 mL    GENERAL:  [x ]Alert  [x ]Oriented x1   [ ]Lethargic  [ ]Cachexia  [ ]Unarousable  [ x]Verbal  [ ]Non-Verbal  Behavioral:   [ ] Anxiety  [ ] Delirium [ ] Agitation [ ] Other  HEENT:  [ ]Normal   [ ]Dry mouth   [ ]ET Tube/Trach  [ ]Oral lesions  PULMONARY:   [x ]Clear left side [ ]Tachypnea  [ ]Audible excessive secretions   [ ]Rhonchi        [ ]Right [ ]Left [ ]Bilateral  [ ]Crackles        [ ]Right [ ]Left [ ]Bilateral  [ ]Wheezing     [ ]Right [ ]Left [ ]Bilateral  diminished on right side   CARDIOVASCULAR:    [ x]Regular [ ]Irregular [ ]Tachy  [ ]Rudolph [ ]Murmur [ ]Other  GASTROINTESTINAL:  [ x]Soft  [ ]Distended   [x ]+BS  [ ]Non tender [ ]Tender  [ ]PEG [ ]OGT/ NGT  Last BM: prior to admission GENITOURINARY:  [ ]Normal [ x] Incontinent   [ ]Oliguria/Anuria   [ ]Lutz  MUSCULOSKELETAL:   [ ]Normal   [ ]Weakness  [x ]Bed/Wheelchair bound [ ]Edema  NEUROLOGIC:   [ ]No focal deficits  [x ] Cognitive impairment  [ ] Dysphagia [ ]Dysarthria [ ] Paresis [ ]Other   SKIN:   [ ]Normal   [x ]Pressure ulcer(s)right great toe unstageable, right heel unstageable, sacrum healed  [ ]Rash    CRITICAL CARE:  [ ] Shock Present  [ ]Septic [ ]Cardiogenic [ ]Neurologic [ ]Hypovolemic  [ ]  Vasopressors [ ]  Inotropes   [ ] Respiratory failure present [ ] mechanical ventilation [ ] non-invasive ventilatory support [ ] High flow  [ ] Acute  [ ] Chronic [ ] Hypoxic  [ ] Hypercarbic [ ] Other  [ ] Other organ failure     LABS:                        12.4   8.90  )-----------( 142      ( 2022 10:26 )             40.2   02-16    139  |  110<H>  |  22  ----------------------------<  54<LL>  4.2   |  23  |  1.07    Ca    8.1<L>      2022 10:26  Mg     2.2     02-16    TPro  6.0  /  Alb  1.5<L>  /  TBili  0.5  /  DBili  x   /  AST  54<H>  /  ALT  43  /  AlkPhos  90  02-15  PTT - ( 2022 10:26 )  PTT:90.0 sec    Urinalysis Basic - ( 15 Feb 2022 09:10 )    Color: Yellow / Appearance: Clear / S.015 / pH: x  Gluc: x / Ketone: Negative  / Bili: Negative / Urobili: Negative mg/dL   Blood: x / Protein: 30 mg/dL / Nitrite: Negative   Leuk Esterase: Negative / RBC: 11-25 /HPF / WBC 3-5   Sq Epi: x / Non Sq Epi: x / Bacteria: Few    D-Dimer Assay, Quantitative (22 @ 19:15)    D-Dimer Assay, Quantitative: 6004: D-Dimer result less than 230 ng/mL DDU correlates with the absence of  thrombosis in a patient with a low and moderate       pre-test probability of thrombosis.  1 DDU is approximately equal to  2 ng/mL FEU (previous units). ng/mL DDU    Culture - Blood (22 @ 01:07)    -  Staphylococcus aureus: Detec Any isolate of Staphylococcus aureus from a blood culture is NOT considered a contaminant.    Gram Stain:   Growth in aerobic bottle: Staphylococcus aureus  Growth in anaerobic bottle: Gram Positive Cocci in Clusters  ***Blood Panel PCR results on this specimen are available  approximately 3 hours after the Gram stain result.***  Gram stain, PCR, and/or culture results may not always  correspond due to difference in methodologies.  ************************************************************  This PCR assay was performed by multiplex PCR. This  Assay tests for 66 bacterial and resistance gene targets.  Please refer to the [facility] test directory  at [email or web address] for details.    Organism Identification: Blood Culture PCR  Staphylococcus aureus    Method Type: SHAYY    Method Type: PCR      RADIOLOGY & ADDITIONAL STUDIES:   < from: CT Angio Chest PE Protocol w/ IV Cont (22 @ 21:41) >  ACC: [id number] EXAM:  CT ANGIO CHEST PULM [facility]                        PROCEDURE DATE:  2022    INTERPRETATION:  CLINICAL INFORMATION: Cough, not eating. Pain. Question   pulmonary embolism  COMPARISON: CT abdomen pelvis 10/24/2009. X-ray chest 2022.  CONTRAST/COMPLICATIONS:  IV Contrast: Omnipaque 350  70 cc administered   30 cc discarded  Oral Contrast: NONE  Complications: None reported at time of study completion  PROCEDURE:  CT Angiography of the Chest.  Sagittal and coronal reformats were performed as well as 3D (MIP)   reconstructions.  FINDINGS:  LUNGS AND AIRWAYS: Patent central airways. Mild emphysema. Right lung   patchy consolidations suspicious for infection. Correlate clinically and   follow to resolution.  PLEURA: No pleural effusion.  MEDIASTINUM AND HAIR: No lymphadenopathy.  VESSELS: Pulmonary embolism in the segmental right lower lobe (6:301-303).  HEART: Heart size is normal. No pericardial effusion. No heart strain.  CHEST WALL AND LOWER NECK: Within normal limits.  VISUALIZED UPPER ABDOMEN: Subcentimeter nonobstructing right renal   calcifications..  BONES: Severe thoracolumbar scoliosis with associated degenerative   changes.  IMPRESSION:  Pulmonary embolism in the segmentalright lower lobe (6:301-303).  Right lung patchy consolidations suspicious for infection. Correlate   clinically and follow to resolution.  Findings were discussed with Dr. Noland 2022 10:36 PM by Dr. Arcos with read back confirmation.    --- End of Report ---  < end of copied text >    < from: Xray Chest 1 View- PORTABLE-Urgent (22 @ 18:37) >  ACC: [id number] EXAM:  XR CHEST PORTABLE URGENT 1V                        PROCEDURE DATE:  2022    INTERPRETATION:  AP chest on 2022 at 6:25 PM. Patient is   short of breath.  Severe kyphoscoliosis again noted. Heart magnified by technique.  Present film shows a sizable right perihilar infiltrate new since   2021.  IMPRESSION: Right perihilar infiltrate. Severe kyphoscoliosis.    --- End of Report ---  < end of copied text >    PROTEIN CALORIE MALNUTRITION PRESENT: [ ] Yes [ ] No  [x ] PPSV2 < or = to 30% [ ] significant weight loss  [ x] poor nutritional intake [ x] catabolic state [ ] anasarca     Artificial Nutrition [ ]     REFERRALS:   [ ]Chaplaincy  [ ] Hospice  [ ]Child Life  [ ]Social Work  [ ]Case management [ ]Holistic Therapy     Goals of Care Document:     HPI:  Patient is a 89M with a PMH of CHF?, prostate Ca who presents to the ED for dyspnea.  Patient currently AAOx1, unable to provide history.  Patient reportedly noted to have decreased appetite and wheezing by his HHA today who called EMS.  No current complaints.  SpO2 currently 96 on 4L via NC.  Vitals stable, labs show mild leukocytosis.  Will admit to tele. (2022 00:27)    PERTINENT PM/SXH:   HTN (hypertension)    CHF (congestive heart failure)    Prostate CA      History of tonsillectomy      FAMILY HISTORY:  FH: HTN (hypertension)      ITEMS NOT CHECKED ARE NOT PRESENT    SOCIAL HISTORY:   Significant other/partner:  [ ]  Children: Eren  [ ]  Sabianist/Spirituality:Zoroastrian  Substance hx:  [ ]   Tobacco hx:  [ ]   Alcohol hx: [ ]   Home Opioid hx:  [ ] I-Stop Reference No: Reference #: [id number]  Living Situation: [x ]Home lives with developmentally delayed son and has HHAs 7 days per week  [ ]Long term care  [ ]Rehab [ ]Other    ADVANCE DIRECTIVES:    DNR  MOLST  [ ]  Living Will  [ ]   DECISION MAKER(s):  [ ] Health Care Proxy(s)  [x ] Surrogate(s)  [ ] Guardian           Name(s): Phone Number(s): Eren (son) [phone]    BASELINE (I)ADL(s) (prior to admission):  [place]: [ ]Total  [ ] Moderate [ ]Dependent    Allergies    No Known Allergies    Intolerances    MEDICATIONS  (STANDING):  dextrose 5% + sodium chloride 0.45%. 1000 milliLiter(s) (60 mL/Hr) IV Continuous <Continuous>  enoxaparin Injectable 45 milliGRAM(s) SubCutaneous two times a day  pantoprazole    Tablet 40 milliGRAM(s) Oral before breakfast  piperacillin/tazobactam IVPB.. 3.375 Gram(s) IV Intermittent <User Schedule>    MEDICATIONS  (PRN):    PRESENT SYMPTOMS: [ ]Unable to obtain due to poor mentation   Source if other than patient:  [ ]Family   [ ]Team     Pain: [ ] yes [x ] no  QOL impact -   Location -                    Aggravating factors -  Quality -  Radiation -  Timing-  Severity (0-10 scale):  Minimal acceptable level (0-10 scale):     PAIN AD Score:     [email or web address]/[email or web address] (press ctrl +  left click to view)    Dyspnea:                           [ ]Mild [ ]Moderate [ ]Severe  Anxiety:                             [ ]Mild [ ]Moderate [ ]Severe  Fatigue:                             [ ]Mild [ ]Moderate [ ]Severe  Nausea:                             [ ]Mild [ ]Moderate [ ]Severe  Loss of appetite:              [ ]Mild [ ]Moderate [ ]Severe  Constipation:                    [ ]Mild [ ]Moderate [ ]Severe    Other Symptoms:  [x ]All other review of systems negative     Karnofsky Performance Score/Palliative Performance Status Version 2:         20%    http://npcrc.org/files/news/palliative_performance_scale_ppsv2.pdf  PHYSICAL EXAM:  Vital Signs Last 24 Hrs  T(C): 36.3 (2022 11:15), Max: 36.4 (15 Feb 2022 23:41)  T(F): 97.4 (2022 11:15), Max: 97.6 (15 Feb 2022 23:41)  HR: 67 (2022 11:15) (64 - 81)  BP: 122/67 (2022 11:15) (107/63 - 125/84)  BP(mean): --  RR: 18 (2022 11:15) (17 - 18)  SpO2: 92% (2022 11:15) (92% - 100%) I&O's Summary    15 Feb 2022 07:01  -  2022 07:00  --------------------------------------------------------  IN: 1437 mL / OUT: 0 mL / NET: 1437 mL    GENERAL:  [x ]Alert  [x ]Oriented x1   [ ]Lethargic  [ ]Cachexia  [ ]Unarousable  [ x]Verbal  [ ]Non-Verbal  Behavioral:   [ ] Anxiety  [ ] Delirium [ ] Agitation [ ] Other  HEENT:  [ ]Normal   [ ]Dry mouth   [ ]ET Tube/Trach  [ ]Oral lesions  PULMONARY:   [x ]Clear left side [ ]Tachypnea  [ ]Audible excessive secretions   [ ]Rhonchi        [ ]Right [ ]Left [ ]Bilateral  [ ]Crackles        [ ]Right [ ]Left [ ]Bilateral  [ ]Wheezing     [ ]Right [ ]Left [ ]Bilateral  diminished on right side   CARDIOVASCULAR:    [ x]Regular [ ]Irregular [ ]Tachy  [ ]Rudolph [ ]Murmur [ ]Other  GASTROINTESTINAL:  [ x]Soft  [ ]Distended   [x ]+BS  [ ]Non tender [ ]Tender  [ ]PEG [ ]OGT/ NGT  Last BM: prior to admission GENITOURINARY:  [ ]Normal [ x] Incontinent   [ ]Oliguria/Anuria   [ ]Lutz  MUSCULOSKELETAL:   [ ]Normal   [ ]Weakness  [x ]Bed/Wheelchair bound [ ]Edema  NEUROLOGIC:   [ ]No focal deficits  [x ] Cognitive impairment  [ ] Dysphagia [ ]Dysarthria [ ] Paresis [ ]Other   SKIN:   [ ]Normal   [x ]Pressure ulcer(s)right great toe unstageable, right heel unstageable, sacrum healed  [ ]Rash    CRITICAL CARE:  [ ] Shock Present  [ ]Septic [ ]Cardiogenic [ ]Neurologic [ ]Hypovolemic  [ ]  Vasopressors [ ]  Inotropes   [ ] Respiratory failure present [ ] mechanical ventilation [ ] non-invasive ventilatory support [ ] High flow  [ ] Acute  [ ] Chronic [ ] Hypoxic  [ ] Hypercarbic [ ] Other  [ ] Other organ failure     LABS:                        12.4   8.90  )-----------( 142      ( 2022 10:26 )             40.2   02-16    139  |  110<H>  |  22  ----------------------------<  54<LL>  4.2   |  23  |  1.07    Ca    8.1<L>      2022 10:26  Mg     2.2     02-16    TPro  6.0  /  Alb  1.5<L>  /  TBili  0.5  /  DBili  x   /  AST  54<H>  /  ALT  43  /  AlkPhos  90  02-15  PTT - ( 2022 10:26 )  PTT:90.0 sec    Urinalysis Basic - ( 15 Feb 2022 09:10 )    Color: Yellow / Appearance: Clear / S.015 / pH: x  Gluc: x / Ketone: Negative  / Bili: Negative / Urobili: Negative mg/dL   Blood: x / Protein: 30 mg/dL / Nitrite: Negative   Leuk Esterase: Negative / RBC: 11-25 /HPF / WBC 3-5   Sq Epi: x / Non Sq Epi: x / Bacteria: Few    D-Dimer Assay, Quantitative (22 @ 19:15)    D-Dimer Assay, Quantitative: 6004: D-Dimer result less than 230 ng/mL DDU correlates with the absence of  thrombosis in a patient with a low and moderate       pre-test probability of thrombosis.  1 DDU is approximately equal to  2 ng/mL FEU (previous units). ng/mL DDU    Culture - Blood (22 @ 01:07)    -  Staphylococcus aureus: Detec Any isolate of Staphylococcus aureus from a blood culture is NOT considered a contaminant.    Gram Stain:   Growth in aerobic bottle: Staphylococcus aureus  Growth in anaerobic bottle: Gram Positive Cocci in Clusters  ***Blood Panel PCR results on this specimen are available  approximately 3 hours after the Gram stain result.***  Gram stain, PCR, and/or culture results may not always  correspond due to difference in methodologies.  ************************************************************  This PCR assay was performed by multiplex PCR. This  Assay tests for 66 bacterial and resistance gene targets.  Please refer to the [facility] test directory  at [email or web address] for details.    Organism Identification: Blood Culture PCR  Staphylococcus aureus    Method Type: SHAYY    Method Type: PCR      RADIOLOGY & ADDITIONAL STUDIES:   < from: CT Angio Chest PE Protocol w/ IV Cont (22 @ 21:41) >  ACC: [id number] EXAM:  CT ANGIO CHEST PULM [facility]                        PROCEDURE DATE:  2022    INTERPRETATION:  CLINICAL INFORMATION: Cough, not eating. Pain. Question   pulmonary embolism  COMPARISON: CT abdomen pelvis 10/24/2009. X-ray chest 2022.  CONTRAST/COMPLICATIONS:  IV Contrast: Omnipaque 350  70 cc administered   30 cc discarded  Oral Contrast: NONE  Complications: None reported at time of study completion  PROCEDURE:  CT Angiography of the Chest.  Sagittal and coronal reformats were performed as well as 3D (MIP)   reconstructions.  FINDINGS:  LUNGS AND AIRWAYS: Patent central airways. Mild emphysema. Right lung   patchy consolidations suspicious for infection. Correlate clinically and   follow to resolution.  PLEURA: No pleural effusion.  MEDIASTINUM AND HAIR: No lymphadenopathy.  VESSELS: Pulmonary embolism in the segmental right lower lobe (6:301-303).  HEART: Heart size is normal. No pericardial effusion. No heart strain.  CHEST WALL AND LOWER NECK: Within normal limits.  VISUALIZED UPPER ABDOMEN: Subcentimeter nonobstructing right renal   calcifications..  BONES: Severe thoracolumbar scoliosis with associated degenerative   changes.  IMPRESSION:  Pulmonary embolism in the segmentalright lower lobe (6:301-303).  Right lung patchy consolidations suspicious for infection. Correlate   clinically and follow to resolution.  Findings were discussed with Dr. Noland 2022 10:36 PM by Dr. Arcos with read back confirmation.    --- End of Report ---  < end of copied text >    < from: Xray Chest 1 View- PORTABLE-Urgent (22 @ 18:37) >  ACC: [id number] EXAM:  XR CHEST PORTABLE URGENT 1V                        PROCEDURE DATE:  2022    INTERPRETATION:  AP chest on 2022 at 6:25 PM. Patient is   short of breath.  Severe kyphoscoliosis again noted. Heart magnified by technique.  Present film shows a sizable right perihilar infiltrate new since   2021.  IMPRESSION: Right perihilar infiltrate. Severe kyphoscoliosis.    --- End of Report ---  < end of copied text >    PROTEIN CALORIE MALNUTRITION PRESENT: [ ] Yes [ ] No  [x ] PPSV2 < or = to 30% [ ] significant weight loss  [ x] poor nutritional intake [ x] catabolic state [ ] anasarca     Artificial Nutrition [ ]     REFERRALS:   [ ]Chaplaincy  [ ] Hospice  [ ]Child Life  [ ]Social Work  [ ]Case management [ ]Holistic Therapy     Goals of Care Document:

## 2022-02-16 NOTE — DIETITIAN INITIAL EVALUATION ADULT. - OTHER INFO
Pt lives with son; has HHA who assists with ADLs; pt bedbound on wheelchair for most of day. Pt with metabolic encephalopathy, h/o dementia, failure to thrive; swallow evaluation pending, pt still NPO with D5 only.  Per chart review, pt weighed 69.9 kg in 02/2018 & current wt 44.2 kg; with 36.8% wt loss x 4 years; pt visibly malnourished.  Per MOLST form, pt DNR/DNI. Pt lives with son; pt has private HHA 8 hrs x 7 days, who assists with ADLs; pt bedbound on wheelchair for most of day. Pt with metabolic encephalopathy, h/o dementia, failure to thrive, with poor po intake at least 1 month pta; swallow evaluation pending, pt NPO with D5 only. Per Palliative care note, patient with severe curvature of spine making him more prone for aspiration; RD will follow up regarding SLP's recommendations.  Per chart review, pt weighed 69.9 kg in 02/2018 & current wt 44.2 kg; with 36.8% wt loss x 4 years; pt visibly malnourished.  Per MOLST form, pt DNR/DNI.

## 2022-02-16 NOTE — PROGRESS NOTE ADULT - ASSESSMENT
Patient is a 89M with a PMH of CHF?, prostate Ca who presents to the ED for dyspnea.  Patient currently AAOx1, unable to provide history.  Patient reportedly noted to have decreased appetite and wheezing by his HHA today who called EMS.  No current complaints.  SpO2 currently 96 on 4L via NC.  Vitals stable, labs show mild leukocytosis.    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   Even if foot is not the source, he needs good wound care and podiatric treatment     2/16: no fever, on NRB, + bacteremia, repeat BCs are pending, TTE pending, abx changed to cefazolin according to the sensitivity    MSSA bacteremia   high serum lactate   CHF   functional quadraplegia     Plan:   stop (Zosyn) Piperacillin/tazobactam 3.375 grams every 8 hours  start Cefazolin IV 2 grams q 12 hrs  repeat blood cultures every 48 hrs until clear   Transthoracic Echocardiogram   trend lactate   trend wbc   consider CT a/p to look for focus of infection   caution with fluids if in fact has heart failure   frequent turns, offloading, and nutrition optimization to avoid bedsores   Patient is a 89M with a PMH of CHF?, prostate Ca who presents to the ED for dyspnea.  Patient currently AAOx1, unable to provide history.  Patient reportedly noted to have decreased appetite and wheezing by his HHA today who called EMS.  No current complaints.  SpO2 currently 96 on 4L via NC.  Vitals stable, labs show mild leukocytosis.    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   Even if foot is not the source, he needs good wound care and podiatric treatment     2/16: no fever, on NRB, + bacteremia, repeat BCs are pending, TTE pending, abx changed to cefazolin according to the sensitivity    MSSA bacteremia   high serum lactate   CHF   functional quadraplegia     Plan:   stop (Zosyn) Piperacillin/tazobactam 3.375 grams every 8 hours  start Cefazolin IV 2 grams q 12 hrs  repeat blood cultures every 48 hrs until clear   Transthoracic Echocardiogram   trend lactate   trend wbc   consider CT a/p to look for focus of infection   caution with fluids if in fact has heart failure   frequent turns, offloading, and nutrition optimization to avoid bedsores  podiatry f/up appreciated  palliative care evaluation appreciated

## 2022-02-16 NOTE — DIETITIAN INITIAL EVALUATION ADULT. - PERTINENT LABORATORY DATA
02-16 Na139 mmol/L Glu 54 mg/dL<LL> K+ 4.2 mmol/L Cr  1.07 mg/dL BUN 22 mg/dL WBC 8.90 K/uL 02-15 Alb 1.5 g/dL<L> 02-15 Chol 85 mg/dL LDL 39 mg/dL  HDL 32 mg/dL<L> Trig 72 mg/dL  02-15 HgbA1c 5.5%

## 2022-02-16 NOTE — CONSULT NOTE ADULT - CONVERSATION DETAILS
Spoke with patient's son, Eren Lemus [phone] to review MOLST.  Patient had MOLST indicating they wanted a "trial of CPR" and DNI.  Discussed that CPR can either be done or not and the breathing machine goes hand in hand with CPR.  Discussed that the his father would likely not survive even with CPR and would likely cause suffering and undue burden.  He agreed that he would not want that for his dad and believes if his father were more lucid, he feels he would say the same.  DNR/I on MOLST.

## 2022-02-16 NOTE — CONSULT NOTE ADULT - PROBLEM SELECTOR RECOMMENDATION 3
frail and thin elderly male   decreased appetite over the past month per discussion with son  short-term memory issues, but patient gets more irritable when he is in an unfamiliar environment  is bedbound  hypoglycemic today  NPO at present-pending swallow eval but patient with severe curvature of the spine making him more prone for aspiration

## 2022-02-16 NOTE — PROGRESS NOTE ADULT - ASSESSMENT
a 89M with a PMH of CHF?, prostate Ca who presents to the ED for dyspnea.  Patient currently AAOx1, unable to provide history.  Patient reportedly noted to have decreased appetite and wheezing by his HHA today who called EMS.  No current complaints.  SpO2 currently 96 on 4L via NC.  Vitals stable, labs show mild leukocytosis.  Will admit to tele.         Metabolic encephalopathy.    Hx of failure to thrive   ·  Plan: Unclear baseline mental status.  Patient presents with hypothermia,   As per son- was congested/ not eating much   Usually pt is bedbound on a wheelchair for most of the day-mainly due to general weakness for over more than 6months, has dementia  Hx of shin wound- and was told not to move around too much for the wound to heal   -swallow eval pending, pt still nppo with D5 only  -lactic acid still elevated   -continue fluids and antibiotics     Bacteremia  -continue IV antibiotics-switched to Zosyn  -As per Podiatry- less likely source of infection being the right hallux, awaiting official xray reports     Acute pulmonary embolus.   switched heparin drip to lovenox (theraputic dose) since pt agitated and not easy stick  Will need to switch to a DOAC but will need to estimate risks/vs benefits given hx of gastric ulcers and hx of bleeding in past     RLL Pneumonia.   Underlying COPD  continue Zosyn   -continues on nonrebreather   -Pulm following pt     Troponin level elevated.   - Troponin elevated.  Will trend  No current chest pain.   As per Cardiology - "no concomitant evidence of acute ischemia clinically or on ECG - likely type II MI in setting of CAP/PE."  ASA relatively contraindicated due to recurrent GI bleed in the past  Bbl relatively contraindicated due to emphysema, smoking history  started on Statin   -ECHO pending     Chronic CHF.   - spironolactone (on hold)   Holding Lasix and aldactone now in setting of hypernatremia and dehydration   -awaiting echo      Lactic acidosis.   ·  Plan: Lactate elevated  Most likely from bacteremia  -ID following, will most likely re-culture pt  -CONTINUE IV antibiotics, fluids      AMNA (acute kidney injury).   -fluids  -Nephrology consult     : Hypernatremia.   ·  Plan: fluids as per above.        Prostate cancer.   ·  Plan: bicalutamide.      Right hallux and heal wounds   -continue wound care  -follow up with Podiatry   -xray pending    dvtppx-Lovenox thearaputic dose         Son- Eren- [phone]-updated  [facility] signed--one time attempt for CPR- then DNR/DNI     a 89M with a PMH of CHF?, prostate Ca who presents to the ED for dyspnea.  Patient currently AAOx1, unable to provide history.  Patient reportedly noted to have decreased appetite and wheezing by his HHA today who called EMS.  No current complaints.  SpO2 currently 96 on 4L via NC.  Vitals stable, labs show mild leukocytosis.  Will admit to tele.         Metabolic encephalopathy.    Hx of failure to thrive   ·  Plan: Unclear baseline mental status.  Patient presents with hypothermia,   As per son- was congested/ not eating much   Usually pt is bedbound on a wheelchair for most of the day-mainly due to general weakness for over more than 6months, has dementia  Hx of shin wound- and was told not to move around too much for the wound to heal   -swallow eval pending, pt still nppo with D5 only  -PO meds on hold since pt having problems swallowing    Bacteremia  -continue IV antibiotics-switched to Zosyn  -As per Podiatry- less likely source of infection being the right hallux, awaiting official xray reports     Acute pulmonary embolus.   switched heparin drip to lovenox (theraputic dose) since pt agitated and not easy stick  Will need to switch to a DOAC but will need to estimate risks/vs benefits given hx of gastric ulcers and hx of bleeding in past     RLL Pneumonia.   Underlying COPD  continue Zosyn   -continues on nonrebreather   -Pulm following pt     Troponin level elevated.   - Troponin elevated.  Will trend  No current chest pain.   As per Cardiology - "no concomitant evidence of acute ischemia clinically or on ECG - likely type II MI in setting of CAP/PE."  ASA relatively contraindicated due to recurrent GI bleed in the past  Bbl relatively contraindicated due to emphysema, smoking history  started on Statin   -ECHO pending     Chronic CHF.   - spironolactone (on hold)   Holding Lasix and aldactone now in setting of hypernatremia and dehydration   -awaiting echo      Lactic acidosis.   ·  Plan: Lactate elevated  Most likely from bacteremia  -ID following, will most likely re-culture pt  -CONTINUE IV antibiotics, fluids      AMNA (acute kidney injury).   -fluids  -Nephrology consult     : Hypernatremia.   ·  Plan: fluids as per above.        Prostate cancer.   ·  Plan: bicalutamide.      Right hallux and heal wounds   -continue wound care  -follow up with Podiatry   -xray pending    dvtppx-Lovenox thearaputic dose         Stephane Jason- [phone]-updated  [facility]--one time attempt for CPR- then DNR/DNI     a 89M with a PMH of CHF?, prostate Ca who presents to the ED for dyspnea.  Patient currently AAOx1, unable to provide history.  Patient reportedly noted to have decreased appetite and wheezing by his HHA today who called EMS.  No current complaints.  SpO2 currently 96 on 4L via NC.  Vitals stable, labs show mild leukocytosis.  Will admit to tele.         Metabolic encephalopathy.    Hx of failure to thrive   ·  Plan: Unclear baseline mental status.  Patient presents with hypothermia,   As per son- was congested/ not eating much   Usually pt is bedbound on a wheelchair for most of the day-mainly due to general weakness for over more than 6months, has dementia  Hx of shin wound- and was told not to move around too much for the wound to heal   -swallow eval pending, pt still nppo with D5 only  -PO meds on hold since pt having problems swallowing    Bacteremia  -continue IV antibiotics-switched to Zosyn  -As per Podiatry- less likely source of infection being the right hallux, awaiting official xray reports     Acute pulmonary embolus.   switched heparin drip to lovenox (theraputic dose) since pt agitated and not easy stick  Will need to switch to a DOAC but will need to estimate risks/vs benefits given hx of gastric ulcers and hx of bleeding in past     RLL Pneumonia.   Underlying COPD  continue Zosyn   -continues on nonrebreather   -Pulm following pt     Troponin level elevated.   - Troponin elevated.  Will trend  No current chest pain.   As per Cardiology - "no concomitant evidence of acute ischemia clinically or on ECG - likely type II MI in setting of CAP/PE."  ASA relatively contraindicated due to recurrent GI bleed in the past  Bbl relatively contraindicated due to emphysema, smoking history  started on Statin   -ECHO pending     Chronic CHF.   - spironolactone (on hold)   Holding Lasix and aldactone now in setting of hypernatremia and dehydration   -awaiting echo      Lactic acidosis.   ·  Plan: Lactate elevated  Most likely from bacteremia  -ID following, will most likely re-culture pt  -CONTINUE IV antibiotics, fluids      AMNA (acute kidney injury).   -fluids  -Nephrology consult     : Hypernatremia.   ·  Plan: fluids as per above.        Prostate cancer.   ·  Plan: bicalutamide.      Right hallux and heal wounds   -continue wound care  -follow up with Podiatry   -xray pending    dvtppx-Lovenox thearaputic dose         Stephane Jason- [phone]-updated  [facility] signed--again- pt is DNR/DNI

## 2022-02-16 NOTE — PROGRESS NOTE ADULT - SUBJECTIVE AND OBJECTIVE BOX
INTERVAL HPI:  89M with HTN, CHF?, Prostate Ca who presents to the ED for dyspnea, wheezing,  wet cough, and decreased appetite for two weeks.  Found to have PE, Pneumonia, Dehydration, hypernatremia and Renal insuffiencey. Blood cultures are growing staph Aureus.  Not able to provide more details.  Started on IV heparin, IV fluids, NRB mask and antibiotics.     OVERNIGHT EVENTS:  Awake, responsive.    Vital Signs Last 24 Hrs  T(C): 36.5 (2022 16:34), Max: 36.5 (2022 16:34)  T(F): 97.7 (2022 16:34), Max: 97.7 (2022 16:34)  HR: 78 (2022 16:34) (58 - 92)  BP: 111/73 (2022 16:34) (111/73 - 125/84)  BP(mean): --  RR: 18 (2022 16:34) (18 - 18)  SpO2: 96% (2022 16:34) (92% - 97%)    PHYSICAL EXAM:  GEN:         Awake, responsive and comfortable.  HEENT:    NRB mask   RESP:       no wheezing.  CVS:          Regular rate and rhythm.   ABD:         Soft, non-tender, non-distended;     MEDICATIONS  (STANDING):  ceFAZolin   IVPB 2000 milliGRAM(s) IV Intermittent every 12 hours  dextrose 5% + sodium chloride 0.45%. 1000 milliLiter(s) (60 mL/Hr) IV Continuous <Continuous>  enoxaparin Injectable 50 milliGRAM(s) SubCutaneous daily  pantoprazole    Tablet 40 milliGRAM(s) Oral before breakfast    LABS:                        12.4   8.90  )-----------( 142      ( 2022 10:26 )             40.2     02-16    139  |  110<H>  |  22  ----------------------------<  54<LL>  4.2   |  23  |  1.07    Ca    8.1<L>      2022 10:26  Mg     2.2     02-16    TPro  6.0  /  Alb  1.5<L>  /  TBili  0.5  /  DBili  x   /  AST  54<H>  /  ALT  43  /  AlkPhos  90  02-15    PTT - ( 2022 10:26 )  PTT:90.0 sec  02-14 @ 17:42  pH: 7.40  pCO2: 39  pO2: 121  SaO2: 99.4  02- @ 19:38  pH: 7.46  pCO2: 35  pO2: 85  SaO2: 97.6    Urinalysis Basic - ( 15 Feb 2022 09:10 )    Color: Yellow / Appearance: Clear / S.015 / pH: x  Gluc: x / Ketone: Negative  / Bili: Negative / Urobili: Negative mg/dL   Blood: x / Protein: 30 mg/dL / Nitrite: Negative   Leuk Esterase: Negative / RBC: 11-25 /HPF / WBC 3-5   Sq Epi: x / Non Sq Epi: x / Bacteria: Few  ASSESSMENT AND PLAN:  ·	Acute PE.  ·	RLL pneumonia.  ·	Staph Aureus bacteremia.  ·	Dehydration.  ·	Hypernatremia.  ·	Renal Insuffiencey  ·	Prostate CA.  ·	HTN.    SPO2 96% on NRB mask. Lactate still high.  ABG tomorrow,  Continue IV hydration.  Continue antibiotics.

## 2022-02-16 NOTE — PROGRESS NOTE ADULT - SUBJECTIVE AND OBJECTIVE BOX
SANDRA ROCHE  MRN-[id number]    Follow Up:  Bacteremia     Interval History: The pt was seen and examined earlier, no distress, not interactive, on NRB, does not answer questions and does not follow commands. The pt is afebrile.     PAST MEDICAL & SURGICAL HISTORY:  HTN (hypertension)    CHF (congestive heart failure)    Prostate CA    History of tonsillectomy        ROS:    [x ] Unobtainable because: pt is not interactive   [ ] All other systems negative    Constitutional: no fever, no chills  Head: no trauma  Eyes: no vision changes, no eye pain  ENT:  no sore throat, no rhinorrhea  Cardiovascular:  no chest pain, no palpitation  Respiratory:  no SOB, no cough  GI:  no abd pain, no vomiting, no diarrhea  urinary: no dysuria, no hematuria, no flank pain  musculoskeletal:  no joint pain, no joint swelling  skin:  no rash  neurology:  no headache, no seizure, no change in mental status  psych: no anxiety, no depression         Allergies  No Known Allergies        ANTIMICROBIALS:  ceFAZolin   IVPB 2000 every 12 hours      OTHER MEDS:  dextrose 5% + sodium chloride 0.45%. 1000 milliLiter(s) IV Continuous <Continuous>  enoxaparin Injectable 50 milliGRAM(s) SubCutaneous daily  pantoprazole    Tablet 40 milliGRAM(s) Oral before breakfast      Vital Signs Last 24 Hrs  T(C): 36.5 (2022 16:34), Max: 36.5 (2022 16:34)  T(F): 97.7 (2022 16:34), Max: 97.7 (2022 16:34)  HR: 78 (2022 16:34) (58 - 78)  BP: 111/73 (2022 16:34) (111/73 - 125/84)  BP(mean): --  RR: 18 (2022 16:34) (18 - 18)  SpO2: 96% (2022 16:34) (92% - 97%)    Physical Exam:  General:    NAD,  non toxic, on NRB  Head: atraumatic, normocephalic  Eye: unable to assess pt's eyes due to non-compliance   ENT:   unable to assess pt's mouth due to non-compliance   Cardio:     regular S1, S2,  no murmur  Respiratory:    somewhat coarse breath sounds b/l,    no wheezing  abd:     soft,   BS +,   no tenderness  :   no CVAT,  no suprapubic tenderness  Musculoskeletal:  no synovitis, normal ROM, right hallux dry gangrene   vascular: no central lines, +PIV   Skin:  no rash, no erythema, no phlebitis  Neurologic:  does not follow commands  psych: unable       WBC Count: 8.90 K/uL ( @ 10:26)  WBC Count: 9.20 K/uL (02-15 @ 11:59)  WBC Count: 11.21 K/uL ( @ 06:33)  WBC Count: 11.45 K/uL ( @ 19:15)                            12.4   8.90  )-----------( 142      ( 2022 10:26 )             40.2           139  |  110<H>  |  22  ----------------------------<  54<LL>  4.2   |  23  |  1.07    Ca    8.1<L>      2022 10:26  Mg     2.2         TPro  6.0  /  Alb  1.5<L>  /  TBili  0.5  /  DBili  x   /  AST  54<H>  /  ALT  43  /  AlkPhos  90  02-15      Urinalysis Basic - ( 15 Feb 2022 09:10 )    Color: Yellow / Appearance: Clear / S.015 / pH: x  Gluc: x / Ketone: Negative  / Bili: Negative / Urobili: Negative mg/dL   Blood: x / Protein: 30 mg/dL / Nitrite: Negative   Leuk Esterase: Negative / RBC: 11-25 /HPF / WBC 3-5   Sq Epi: x / Non Sq Epi: x / Bacteria: Few        Creatinine Trend: 1.07<--, 1.18<--, 1.46<--, 2.01<--  Lactate, Blood: 5.3 mmol/L (22 @ 16:20)  Lactate, Blood: 6.1 mmol/L (22 @ 10:26)  Lactate, Blood: 4.6 mmol/L (02-15-22 @ 15:53)  Lactate, Blood: 5.7 mmol/L (02-15-22 @ 11:59)  Lactate, Blood: 5.4 mmol/L (22 @ 21:45)      MICROBIOLOGY:  v  .Blood Blood  22   Growth in aerobic bottle: Staphylococcus aureus  Growth in anaerobic bottle: Gram Positive Cocci in Clusters  ***Blood Panel PCR results on this specimen are available  approximately 3 hours after the Gram stain result.***  Gram stain, PCR, and/or culture results may not always  correspond due to difference in methodologies.  ************************************************************  This PCR assay was performed by multiplex PCR. This  Assay tests for 66 bacterial and resistance gene targets.  Please refer to the [facility] test directory  at [email or web address] for details.  --  Blood Culture PCR  Staphylococcus aureus      D-Dimer Assay, Quantitative: 6004 ()      SARS-CoV-2 Result: NotDetec (22 @ 19:15)      RADIOLOGY:

## 2022-02-16 NOTE — CONSULT NOTE ADULT - PROBLEM SELECTOR RECOMMENDATION 6
Called patient's son, Eren [phone] to discuss GOC further.  MOLST updated indicating DNR/I.      Discussed with Dr. Hidalgo diagnosed about 20 years ago  per son, Eren, patient was treated for cancer 20 years ago

## 2022-02-16 NOTE — DIETITIAN NUTRITION RISK NOTIFICATION - TREATMENT: THE FOLLOWING DIET HAS BEEN RECOMMENDED
Continue NPO as medically appropriate; pending swallow evaluation with SLP; RD will follow up regarding diet advancement    Diet, NPO:   Except Medications (02-15-22 @ 11:08) [Active]

## 2022-02-16 NOTE — CONSULT NOTE ADULT - PROBLEM SELECTOR RECOMMENDATION 5
diagnosed about 20 years ago  per son, Eren, patient was treated for cancer 20 years ago dependent for care  bedbound at home has HHA 7 days/week

## 2022-02-16 NOTE — PROGRESS NOTE ADULT - SUBJECTIVE AND OBJECTIVE BOX
Patient is a 89y old  Male who presents with a chief complaint of dyspnea. (15 Feb 2022 20:54)    PAST MEDICAL & SURGICAL HISTORY:  HTN (hypertension)    CHF (congestive heart failure)    Prostate CA    History of tonsillectomy    INTERVAL HISTORY: +Dyspneic, restless, on 100% NRB.  	  MEDICATIONS:  MEDICATIONS  (STANDING):  ceFAZolin   IVPB 2000 milliGRAM(s) IV Intermittent every 12 hours  dextrose 5% + sodium chloride 0.45%. 1000 milliLiter(s) (60 mL/Hr) IV Continuous <Continuous>  enoxaparin Injectable 45 milliGRAM(s) SubCutaneous two times a day  pantoprazole    Tablet 40 milliGRAM(s) Oral before breakfast    Vitals:  T(F): 97.4 (02-16-22 @ 11:15), Max: 97.6 (02-15-22 @ 23:41)  HR: 67 (02-16-22 @ 11:15) (64 - 81)  BP: 122/67 (02-16-22 @ 11:15) (107/63 - 125/84)  RR: 18 (02-16-22 @ 11:15) (17 - 18)  SpO2: 92% (02-16-22 @ 11:15) (92% - 100%)  02-15 @ 07:01  -  02-16 @ 07:00  --------------------------------------------------------  IN:    dextrose 5%: 1200 mL    dextrose 5% + sodium chloride 0.45%: 60 mL    Heparin Infusion: 77 mL    IV PiggyBack: 100 mL  Total IN: 1437 mL    OUT:  Total OUT: 0 mL    Total NET: 1437 mL    Weight (kg): 44.4 (02-14 @ 01:14)  BMI (kg/m2): 15.8 (02-14 @ 01:14)    PHYSICAL EXAM:  Neuro: Awake, restless  CV: S1 S2 RRR  Lungs: Diminished bases bilaterally.  GI: Soft, BS +, ND, NT  Extremities: No edema. Right great toe tip necrosis, chronic LE venous insufficiency.    RADIOLOGY:   < from: CT Angio Chest PE Protocol w/ IV Cont (02.13.22 @ 21:41) >  IMPRESSION:    Pulmonary embolism in the segmentalright lower lobe (6:301-303).    Right lung patchy consolidations suspicious for infection. Correlate   clinically and follow to resolution.  Findings were discussed with Dr. Noland 2/13/2022 10:36 PM by Dr. Arcos with read back confirmation.    < end of copied text >    < from: Xray Chest 1 View- PORTABLE-Urgent (02.13.22 @ 18:37) >  IMPRESSION: Right perihilar infiltrate. Severe kyphoscoliosis.    < end of copied text >    DIAGNOSTIC TESTING:    [X] Echocardiogram:  < from: TTE Echo Doppler w/o Cont (02.12.18 @ 15:31) >  PHYSICIAN INTERPRETATION:  Left Ventricle: Normal left ventricular size and wall thicknesses, with   normal systolic and diastolic function.  Left ventricular ejection fraction, by visual estimation, is 60 to 65%.  Right Ventricle: Normal right ventricular size and function.  Left Atrium: The left atrium is normal in size. Normal left atrial size.  Right Atrium: The right atrium is normal in size. Normal right atrial   size.  Pericardium: There is no evidence of pericardial effusion.  Mitral Valve: Structurally normal mitral valve, with normal leaflet   excursion. Thickening and calcification of the anterior and posterior   mitral valve leaflets. Trace mitral valve regurgitation is seen.  Tricuspid Valve: Structurally normal tricuspid valve, with normal leaflet   excursion. The tricuspid valve is normal in structure. Mild tricuspid   regurgitation is visualized.  Aortic Valve: Normal trileaflet aortic valve with normal opening. Mild   aortic valve regurgitation is seen.  Pulmonic Valve: Structurally normal pulmonic valve, with normal leaflet   excursion. Trace pulmonicvalve regurgitation.  Aorta: The aortic root and ascending aorta are structurally normal, with   no evidence of dilitation.  Pulmonary Artery: The main pulmonary artery is normal in size.     Summary:   1. Left ventricular ejection fraction, by visual estimation, is 60 to   65%.   2. Normal left ventricular size and wall thicknesses, with normal   systolic and diastolic function.   3. Normal right ventricular size and function.   4. The left atrium is normal in size.   5. Normal left atrial size.   6. Normal right atrial size.   7. The right atrium is normal in size.   8. There is no evidence of pericardial effusion.   9. Thickening and calcification of the anterior and posterior mitral   valve leaflets.  10. Trace mitral valve regurgitation.  11. Mild aortic, tricuspid regurgitation.  12. Normal trileaflet aortic valve with normal opening.    < end of copied text >    LABS:	 	  CARDIAC MARKERS:  Troponin I, High Sensitivity Result: 109.0 ng/L (02-14 @ 02:58)  Troponin I, High Sensitivity Result: 185.7 ng/L (02-13 @ 19:15)    16 Feb 2022 10:26    139    |  110    |  22     ----------------------------<  54     4.2     |  23     |  1.07   15 Feb 2022 11:59    141    |  110    |  36     ----------------------------<  70     3.5     |  24     |  1.18   14 Feb 2022 02:58    152    |  123    |  54     ----------------------------<  101    3.7     |  23     |  1.46     Ca    8.1        16 Feb 2022 10:26  Mg     2.2       16 Feb 2022 10:26    TPro  6.0    /  Alb  1.5    /  TBili  0.5    /  DBili  x      /  AST  54     /  ALT  43     /  AlkPhos  90     15 Feb 2022 11:59               12.4   8.90  )-----------( 142      ( 16 Feb 2022 10:26 )             40.2 ,                       11.6   9.20  )-----------( 115      ( 15 Feb 2022 11:59 )             38.2 ,                       12.2   11.21 )-----------( 158      ( 14 Feb 2022 06:33 )             40.5 ,                       13.5   11.45 )-----------( 167      ( 13 Feb 2022 19:15 )             44.4   proBNP: Serum Pro-Brain Natriuretic Peptide: 958 pg/mL (02-13 @ 19:15)    Lipid Profile: 02-15 @ 17:02  HDL Chol:              32 mg/dL  Serum Chol:            85 mg/dL  Triglycerides:         72 mg/dL    PT/PTT- ( 16 Feb 2022 10:26 )   PT: x    ;  PTT: 90.0 sec    INR: 1.33 ratio (02-13 @ 22:42)           Patient is a 89y old  Male who presents with a chief complaint of dyspnea. (15 Feb 2022 20:54)    PAST MEDICAL & SURGICAL HISTORY:  HTN (hypertension)    CHF (congestive heart failure)    Prostate CA    duodenal ulcer    History of tonsillectomy    INTERVAL HISTORY: +Dyspneic, restless at times, on 100% NRB.  	  MEDICATIONS:  MEDICATIONS  (STANDING):  ceFAZolin   IVPB 2000 milliGRAM(s) IV Intermittent every 12 hours  dextrose 5% + sodium chloride 0.45%. 1000 milliLiter(s) (60 mL/Hr) IV Continuous <Continuous>  enoxaparin Injectable 45 milliGRAM(s) SubCutaneous two times a day  pantoprazole    Tablet 40 milliGRAM(s) Oral before breakfast    Vitals:  T(F): 97.4 (02-16-22 @ 11:15), Max: 97.6 (02-15-22 @ 23:41)  HR: 67 (02-16-22 @ 11:15) (64 - 81)  BP: 122/67 (02-16-22 @ 11:15) (107/63 - 125/84)  RR: 18 (02-16-22 @ 11:15) (17 - 18)  SpO2: 92% (02-16-22 @ 11:15) (92% - 100%)  02-15 @ 07:01  -  02-16 @ 07:00  --------------------------------------------------------  IN:    dextrose 5%: 1200 mL    dextrose 5% + sodium chloride 0.45%: 60 mL    Heparin Infusion: 77 mL    IV PiggyBack: 100 mL  Total IN: 1437 mL    OUT:  Total OUT: 0 mL    Total NET: 1437 mL    Weight (kg): 44.4 (02-14 @ 01:14)  BMI (kg/m2): 15.8 (02-14 @ 01:14)    PHYSICAL EXAM:  Neuro: Awake, restless at times   CV: S1 S2 RRR  Lungs: Diminished bases bilaterally.  GI: Soft, BS +, ND, NT  Extremities: Trace LE edema. Right great toe tip necrosis, Rt heel eschar    RADIOLOGY:   < from: CT Angio Chest PE Protocol w/ IV Cont (02.13.22 @ 21:41) >  IMPRESSION:    Pulmonary embolism in the segmental right lower lobe (6:301-303).    Right lung patchy consolidations suspicious for infection. Correlate   clinically and follow to resolution.    < end of copied text >    < from: Xray Chest 1 View- PORTABLE-Urgent (02.13.22 @ 18:37) >  IMPRESSION: Right perihilar infiltrate. Severe kyphoscoliosis.    < end of copied text >    DIAGNOSTIC TESTING:    [X] Echocardiogram:  < from: TTE Echo Doppler w/o Cont (02.12.18 @ 15:31) >  PHYSICIAN INTERPRETATION:  Left Ventricle: Normal left ventricular size and wall thicknesses, with   normal systolic and diastolic function.  Left ventricular ejection fraction, by visual estimation, is 60 to 65%.  Right Ventricle: Normal right ventricular size and function.  Left Atrium: The left atrium is normal in size. Normal left atrial size.  Right Atrium: The right atrium is normal in size. Normal right atrial   size.  Pericardium: There is no evidence of pericardial effusion.  Mitral Valve: Structurally normal mitral valve, with normal leaflet   excursion. Thickening and calcification of the anterior and posterior   mitral valve leaflets. Trace mitral valve regurgitation is seen.  Tricuspid Valve: Structurally normal tricuspid valve, with normal leaflet   excursion. The tricuspid valve is normal in structure. Mild tricuspid   regurgitation is visualized.  Aortic Valve: Normal trileaflet aortic valve with normal opening. Mild   aortic valve regurgitation is seen.  Pulmonic Valve: Structurally normal pulmonic valve, with normal leaflet   excursion. Trace pulmonic valve regurgitation.  Aorta: The aortic root and ascending aorta are structurally normal, with   no evidence of dilitation.  Pulmonary Artery: The main pulmonary artery is normal in size.     Summary:   1. Left ventricular ejection fraction, by visual estimation, is 60 to   65%.   2. Normal left ventricular size and wall thicknesses, with normal   systolic and diastolic function.   3. Normal right ventricular size and function.   4. The left atrium is normal in size.   5. Normal left atrial size.   6. Normal right atrial size.   7. The right atrium is normal in size.   8. There is no evidence of pericardial effusion.   9. Thickening and calcification of the anterior and posterior mitral   valve leaflets.  10. Trace mitral valve regurgitation.  11. Mild aortic, tricuspid regurgitation.  12. Normal trileaflet aortic valve with normal opening.    < end of copied text >    LABS:	 	  CARDIAC MARKERS:  Troponin I, High Sensitivity Result: 109.0 ng/L (02-14 @ 02:58)  Troponin I, High Sensitivity Result: 185.7 ng/L (02-13 @ 19:15)    16 Feb 2022 10:26    139    |  110    |  22     ----------------------------<  54     4.2     |  23     |  1.07   15 Feb 2022 11:59    141    |  110    |  36     ----------------------------<  70     3.5     |  24     |  1.18   14 Feb 2022 02:58    152    |  123    |  54     ----------------------------<  101    3.7     |  23     |  1.46     Ca    8.1        16 Feb 2022 10:26  Mg     2.2       16 Feb 2022 10:26    TPro  6.0    /  Alb  1.5    /  TBili  0.5    /  DBili  x      /  AST  54     /  ALT  43     /  AlkPhos  90     15 Feb 2022 11:59               12.4   8.90  )-----------( 142      ( 16 Feb 2022 10:26 )             40.2 ,                       11.6   9.20  )-----------( 115      ( 15 Feb 2022 11:59 )             38.2 ,                       12.2   11.21 )-----------( 158      ( 14 Feb 2022 06:33 )             40.5 ,                       13.5   11.45 )-----------( 167      ( 13 Feb 2022 19:15 )             44.4   proBNP: Serum Pro-Brain Natriuretic Peptide: 958 pg/mL (02-13 @ 19:15)    Lipid Profile: 02-15 @ 17:02  HDL Chol:              32 mg/dL  Serum Chol:            85 mg/dL  Triglycerides:         72 mg/dL    PT/PTT- ( 16 Feb 2022 10:26 )   PT: x    ;  PTT: 90.0 sec    INR: 1.33 ratio (02-13 @ 22:42)

## 2022-02-16 NOTE — DIETITIAN INITIAL EVALUATION ADULT. - PERTINENT MEDS FT
MEDICATIONS  (STANDING):  ceFAZolin   IVPB 2000 milliGRAM(s) IV Intermittent every 12 hours  dextrose 5% + sodium chloride 0.45%. 1000 milliLiter(s) (60 mL/Hr) IV Continuous <Continuous>  enoxaparin Injectable 50 milliGRAM(s) SubCutaneous daily  pantoprazole    Tablet 40 milliGRAM(s) Oral before breakfast    MEDICATIONS  (PRN):

## 2022-02-16 NOTE — CONSULT NOTE ADULT - PROBLEM SELECTOR RECOMMENDATION 7
Called patient's son, Eren [phone] to discuss GOC further.  MOLST updated indicating DNR/I.      Discussed with Dr. Hidalgo

## 2022-02-16 NOTE — DIETITIAN INITIAL EVALUATION ADULT. - ETIOLOGY
Inadequate protein-energy intake & increased protein-energy needs related to CHF, dementia, prostate cancer, advanced age & pressure ulcer
Pulses equal bilaterally, no edema present.

## 2022-02-16 NOTE — PROGRESS NOTE ADULT - ASSESSMENT
89M with HTN, HLD, CHF?, Prostate Ca, EGD which showed a duodenal ulcer in 2015, who presents to the ED for dyspnea, wheezing,  wet cough, and decreased appetite for two weeks.  Found to have PE, Pneumonia, Dehydration, hypernatremia and Renal insuffiencey.  Blood cultures are growing staph Aureus.  Trop 185->105  Remains on 100% NRB   Patient remains restless at times with hand mittens for pt safety     ·	Acute dyspnea/failure to thrive  ·	community acquired pneumonia  ·	RLL Pulmonary Embolism  ·	staph Aureus bacteremia   ·	Elevated cardiac enzymes - no concomitant evidence of acute ischemia clinically or on ECG - likely type II MI in setting of CAP/PE.  ·	Hypernatremia /AMNA    Plan:  TTE pending.  On IV heparin for PE, cautious dosing given history of multiple GI bleeds, monitor H/H closely. On PPI (pantaprazole 40 mg)   LFTs improving - Can restart statins  Cont supplemental O2 as needed   Elevated troponins now downtrending, likely type II MI. ASA relatively contraindicated due to recurrent GI bleed in the past. Would obtain echo to see if evidence of WMA that would lead us to consider ischemic substrate. If EF normal and no WMA, would defer any cardiac evaluation or risk stratification for out patient management given age and generalized debility.   Continue treatment for CAP with antibiotics as per medicine. ID following.  Holding Lasix and aldactone now in setting of hypernatremia and dehydration   Hypernatremia /AMNA now resolved with IV hydration (changed to D5 NS).  Pulm recs appreciated.   Palliative following.    89M with HTN, HLD, CHF?, Prostate Ca, EGD showed a duodenal ulcer in 2015, who presents to the ED for dyspnea, wheezing,  wet cough, and decreased appetite for two weeks.  Found to have PE, Pneumonia, Dehydration, hypernatremia and Renal insuffiencey.  Trop 185->105  blood cultures positive for SOHAM  Remains on 100% NRB   Patient remains restless at times with hand mittens for pt safety     ·	Acute dyspnea/failure to thrive  ·	MSSA bacteremia   ·	RLL Pulmonary Embolism  ·	Elevated cardiac enzymes - no concomitant evidence of acute ischemia clinically or on ECG - likely type II MI in setting of infection/PE.    Plan:  TTE with preserved EF, Normal right ventricular size and function.  On IV heparin for PE, now switched to SQ Lovenox full dose, cautious dosing given history of multiple GI bleeds, monitor H/H closely. On PPI (pantaprazole 40 mg)  LFTs improving - Can restart statins  Cont supplemental O2, wean as tolerated     Elevated troponins now downtrending, likely type II MI. ASA relatively contraindicated due to recurrent GI bleed in the past. No evidence of WMA in ECho that would lead us to consider ischemic substrate.   Continue treatment for Bacteremia with antibiotics as per medicine/ ID   Holding Lasix and aldactone now in setting of hypernatremia and dehydration, no overt fluid overload noted  Hypernatremia /AMNA now resolved with IV hydration   Palliative care eval appreciated, DNR status

## 2022-02-16 NOTE — CONSULT NOTE ADULT - PROBLEM SELECTOR RECOMMENDATION 2
blood cultures with s. aureus  on cefazolin- sensitive on blood culture report  lactate upward trending   per podiatry right foot unlikely source for bacteremia  CXR with infiltrates possible pna?

## 2022-02-16 NOTE — CONSULT NOTE ADULT - PROBLEM SELECTOR RECOMMENDATION 9
CT chest: right subsegmental PE  was on heparin gtt being converted to lovenox   on NRB with diminished right sided lung sounds on exam  denies shortness of breath but irritable

## 2022-02-16 NOTE — PROGRESS NOTE ADULT - SUBJECTIVE AND OBJECTIVE BOX
Patient is a 89y old  Male who presents with a chief complaint of PE, PNA? (2022 08:40)      INTERVAL HPI/OVERNIGHT EVENTS: Pt laying in bed, more agitated today,   blood culture- growing stap aureus one bottle     MEDICATIONS  (STANDING):  ceFAZolin   IVPB 2000 milliGRAM(s) IV Intermittent every 12 hours  dextrose 5% + sodium chloride 0.45%. 1000 milliLiter(s) (60 mL/Hr) IV Continuous <Continuous>  enoxaparin Injectable 50 milliGRAM(s) SubCutaneous daily  pantoprazole    Tablet 40 milliGRAM(s) Oral before breakfast    MEDICATIONS  (PRN):          Allergies    No Known Allergies    Intolerances        REVIEW OF SYSTEMS:  unable to obtain fully due to mentation     ICU Vital Signs Last 24 Hrs  T(C): 36.3 (2022 11:15), Max: 36.4 (15 Feb 2022 23:41)  T(F): 97.4 (2022 11:15), Max: 97.6 (15 Feb 2022 23:41)  HR: 67 (2022 11:15) (64 - 80)  BP: 122/67 (2022 11:15) (107/63 - 125/84)  BP(mean): --  ABP: --  ABP(mean): --  RR: 18 (2022 11:15) (17 - 18)  SpO2: 92% (2022 11:15) (92% - 100%)      PHYSICAL EXAM:  GENERAL: NAD, well-groomed, well-developed, very thin, opening eyes  HEAD:  Atraumatic, Normocephalic  EYES: EOMI, PERRLA, conjunctiva and sclera clear  ENMT: No tonsillar erythema, exudates, or enlargement; Moist mucous membranes, Good dentition, No lesions  NECK: Supple, No JVD,  NERVOUS SYSTEM:  Alert & Oriented X1,   CHEST/LUNG: Clear to percussion bilaterally; No rales, rhonchi, wheezing, or rubs  HEART: Regular rate and rhythm; No murmurs, rubs, or gallops  ABDOMEN: Thin,, Soft, Nontender, Nondistended; Bowel sounds present  EXTREMITIES:  2+ Peripheral Pulses, No clubbing, cyanosis, or edema, right hallux and heal wtih dry eschars   SKIN: No rashes or lesions    LABS:                                                                     12.4   8.90  )-----------( 142      ( 2022 10:26 )             40.2     02-16    139  |  110<H>  |  22  ----------------------------<  54<LL>  4.2   |  23  |  1.07    Ca    8.1<L>      2022 10:26  Mg     2.2     02-16    TPro  6.0  /  Alb  1.5<L>  /  TBili  0.5  /  DBili  x   /  AST  54<H>  /  ALT  43  /  AlkPhos  90  02-15    PTT - ( 2022 10:26 )  PTT:90.0 sec  Urinalysis Basic - ( 15 Feb 2022 09:10 )    Color: Yellow / Appearance: Clear / S.015 / pH: x  Gluc: x / Ketone: Negative  / Bili: Negative / Urobili: Negative mg/dL   Blood: x / Protein: 30 mg/dL / Nitrite: Negative   Leuk Esterase: Negative / RBC: 11-25 /HPF / WBC 3-5   Sq Epi: x / Non Sq Epi: x / Bacteria: Few                   Patient is a 89y old  Male who presents with a chief complaint of PE, PNA? (2022 08:40)      INTERVAL HPI/OVERNIGHT EVENTS: Pt laying in bed, more agitated today,   blood culture- growing stap aureus one bottle     MEDICATIONS  (STANDING):  ceFAZolin   IVPB 2000 milliGRAM(s) IV Intermittent every 12 hours  dextrose 5% + sodium chloride 0.45%. 1000 milliLiter(s) (60 mL/Hr) IV Continuous <Continuous>  enoxaparin Injectable 50 milliGRAM(s) SubCutaneous daily  pantoprazole    Tablet 40 milliGRAM(s) Oral before breakfast    MEDICATIONS  (PRN):          Allergies    No Known Allergies    Intolerances        REVIEW OF SYSTEMS:  unable to obtain fully due to mentation     ICU Vital Signs Last 24 Hrs  T(C): 36.3 (2022 11:15), Max: 36.4 (15 Feb 2022 23:41)  T(F): 97.4 (2022 11:15), Max: 97.6 (15 Feb 2022 23:41)  HR: 67 (2022 11:15) (64 - 80)  BP: 122/67 (2022 11:15) (107/63 - 125/84)  BP(mean): --  ABP: --  ABP(mean): --  RR: 18 (2022 11:15) (17 - 18)  SpO2: 92% (2022 11:15) (92% - 100%)      PHYSICAL EXAM:  GENERAL: NAD, well-groomed, well-developed, very thin, opening eyes, on nonrebreather  HEAD:  Atraumatic, Normocephalic  EYES: EOMI, PERRLA, conjunctiva and sclera clear  ENMT: No tonsillar erythema, exudates, or enlargement; Moist mucous membranes, Good dentition, No lesions  NECK: Supple, No JVD,  NERVOUS SYSTEM:  Alert & Oriented X1,   CHEST/LUNG: Clear to percussion bilaterally; No rales, rhonchi, wheezing, or rubs  HEART: Regular rate and rhythm; No murmurs, rubs, or gallops  ABDOMEN: Thin,, Soft, Nontender, Nondistended; Bowel sounds present  EXTREMITIES:  2+ Peripheral Pulses, No clubbing, cyanosis, or edema, right hallux and heal wtih dry eschars   SKIN: No rashes or lesions    LABS:                                                                     12.4   8.90  )-----------( 142      ( 2022 10:26 )             40.2     02-16    139  |  110<H>  |  22  ----------------------------<  54<LL>  4.2   |  23  |  1.07    Ca    8.1<L>      2022 10:26  Mg     2.2     -16    TPro  6.0  /  Alb  1.5<L>  /  TBili  0.5  /  DBili  x   /  AST  54<H>  /  ALT  43  /  AlkPhos  90  02-15    PTT - ( 2022 10:26 )  PTT:90.0 sec  Urinalysis Basic - ( 15 Feb 2022 09:10 )    Color: Yellow / Appearance: Clear / S.015 / pH: x  Gluc: x / Ketone: Negative  / Bili: Negative / Urobili: Negative mg/dL   Blood: x / Protein: 30 mg/dL / Nitrite: Negative   Leuk Esterase: Negative / RBC: 11-25 /HPF / WBC 3-5   Sq Epi: x / Non Sq Epi: x / Bacteria: Few

## 2022-02-17 LAB
ANION GAP SERPL CALC-SCNC: 2 MMOL/L — LOW (ref 5–17)
ANION GAP SERPL CALC-SCNC: 6 MMOL/L — SIGNIFICANT CHANGE UP (ref 5–17)
BUN SERPL-MCNC: 12 MG/DL — SIGNIFICANT CHANGE UP (ref 7–23)
BUN SERPL-MCNC: 15 MG/DL — SIGNIFICANT CHANGE UP (ref 7–23)
CALCIUM SERPL-MCNC: 8.1 MG/DL — LOW (ref 8.5–10.1)
CALCIUM SERPL-MCNC: 8.1 MG/DL — LOW (ref 8.5–10.1)
CHLORIDE SERPL-SCNC: 111 MMOL/L — HIGH (ref 96–108)
CHLORIDE SERPL-SCNC: 112 MMOL/L — HIGH (ref 96–108)
CO2 SERPL-SCNC: 23 MMOL/L — SIGNIFICANT CHANGE UP (ref 22–31)
CO2 SERPL-SCNC: 26 MMOL/L — SIGNIFICANT CHANGE UP (ref 22–31)
CREAT SERPL-MCNC: 0.97 MG/DL — SIGNIFICANT CHANGE UP (ref 0.5–1.3)
CREAT SERPL-MCNC: 0.99 MG/DL — SIGNIFICANT CHANGE UP (ref 0.5–1.3)
GLUCOSE SERPL-MCNC: 73 MG/DL — SIGNIFICANT CHANGE UP (ref 70–99)
GLUCOSE SERPL-MCNC: 84 MG/DL — SIGNIFICANT CHANGE UP (ref 70–99)
HCT VFR BLD CALC: 38.6 % — LOW (ref 39–50)
HGB BLD-MCNC: 11.8 G/DL — LOW (ref 13–17)
LACTATE SERPL-SCNC: 2.4 MMOL/L — HIGH (ref 0.7–2)
MAGNESIUM SERPL-MCNC: 2.1 MG/DL — SIGNIFICANT CHANGE UP (ref 1.6–2.6)
MCHC RBC-ENTMCNC: 27 PG — SIGNIFICANT CHANGE UP (ref 27–34)
MCHC RBC-ENTMCNC: 30.6 G/DL — LOW (ref 32–36)
MCV RBC AUTO: 88.3 FL — SIGNIFICANT CHANGE UP (ref 80–100)
NRBC # BLD: 0 /100 WBCS — SIGNIFICANT CHANGE UP (ref 0–0)
PHOSPHATE SERPL-MCNC: 2.1 MG/DL — LOW (ref 2.5–4.5)
PLATELET # BLD AUTO: 165 K/UL — SIGNIFICANT CHANGE UP (ref 150–400)
POTASSIUM SERPL-MCNC: 2.9 MMOL/L — CRITICAL LOW (ref 3.5–5.3)
POTASSIUM SERPL-MCNC: 4 MMOL/L — SIGNIFICANT CHANGE UP (ref 3.5–5.3)
POTASSIUM SERPL-SCNC: 2.9 MMOL/L — CRITICAL LOW (ref 3.5–5.3)
POTASSIUM SERPL-SCNC: 4 MMOL/L — SIGNIFICANT CHANGE UP (ref 3.5–5.3)
RBC # BLD: 4.37 M/UL — SIGNIFICANT CHANGE UP (ref 4.2–5.8)
RBC # FLD: 15.7 % — HIGH (ref 10.3–14.5)
SODIUM SERPL-SCNC: 140 MMOL/L — SIGNIFICANT CHANGE UP (ref 135–145)
SODIUM SERPL-SCNC: 140 MMOL/L — SIGNIFICANT CHANGE UP (ref 135–145)
WBC # BLD: 8.69 K/UL — SIGNIFICANT CHANGE UP (ref 3.8–10.5)
WBC # FLD AUTO: 8.69 K/UL — SIGNIFICANT CHANGE UP (ref 3.8–10.5)

## 2022-02-17 PROCEDURE — 99233 SBSQ HOSP IP/OBS HIGH 50: CPT

## 2022-02-17 PROCEDURE — 99232 SBSQ HOSP IP/OBS MODERATE 35: CPT

## 2022-02-17 RX ORDER — POTASSIUM CHLORIDE 20 MEQ
10 PACKET (EA) ORAL
Refills: 0 | Status: COMPLETED | OUTPATIENT
Start: 2022-02-17 | End: 2022-02-17

## 2022-02-17 RX ORDER — SODIUM,POTASSIUM PHOSPHATES 278-250MG
1 POWDER IN PACKET (EA) ORAL THREE TIMES A DAY
Refills: 0 | Status: COMPLETED | OUTPATIENT
Start: 2022-02-17 | End: 2022-02-18

## 2022-02-17 RX ORDER — POTASSIUM CHLORIDE 20 MEQ
40 PACKET (EA) ORAL ONCE
Refills: 0 | Status: COMPLETED | OUTPATIENT
Start: 2022-02-17 | End: 2022-02-17

## 2022-02-17 RX ORDER — ATORVASTATIN CALCIUM 80 MG/1
40 TABLET, FILM COATED ORAL AT BEDTIME
Refills: 0 | Status: DISCONTINUED | OUTPATIENT
Start: 2022-02-17 | End: 2022-02-17

## 2022-02-17 RX ORDER — ATORVASTATIN CALCIUM 80 MG/1
20 TABLET, FILM COATED ORAL AT BEDTIME
Refills: 0 | Status: DISCONTINUED | OUTPATIENT
Start: 2022-02-17 | End: 2022-02-28

## 2022-02-17 RX ORDER — BICALUTAMIDE 50 MG/1
50 TABLET, FILM COATED ORAL DAILY
Refills: 0 | Status: DISCONTINUED | OUTPATIENT
Start: 2022-02-17 | End: 2022-02-28

## 2022-02-17 RX ADMIN — Medication 100 MILLIEQUIVALENT(S): at 11:41

## 2022-02-17 RX ADMIN — Medication 40 MILLIEQUIVALENT(S): at 11:41

## 2022-02-17 RX ADMIN — PANTOPRAZOLE SODIUM 40 MILLIGRAM(S): 20 TABLET, DELAYED RELEASE ORAL at 05:20

## 2022-02-17 RX ADMIN — Medication 1 PACKET(S): at 21:06

## 2022-02-17 RX ADMIN — Medication 100 MILLIEQUIVALENT(S): at 13:35

## 2022-02-17 RX ADMIN — ENOXAPARIN SODIUM 50 MILLIGRAM(S): 100 INJECTION SUBCUTANEOUS at 11:40

## 2022-02-17 RX ADMIN — Medication 100 MILLIEQUIVALENT(S): at 10:17

## 2022-02-17 RX ADMIN — Medication 100 MILLIGRAM(S): at 17:10

## 2022-02-17 RX ADMIN — ATORVASTATIN CALCIUM 20 MILLIGRAM(S): 80 TABLET, FILM COATED ORAL at 21:07

## 2022-02-17 RX ADMIN — Medication 100 MILLIGRAM(S): at 05:20

## 2022-02-17 NOTE — PROGRESS NOTE ADULT - SUBJECTIVE AND OBJECTIVE BOX
Patient is a 89y old  Male who presents with a chief complaint of PE, PNA? (14 Feb 2022 08:40)      INTERVAL HPI/OVERNIGHT EVENTS: Pt improving- speaking in full sentences, denies any pain, passed speech and swallow.     MEDICATIONS  (STANDING):  ceFAZolin   IVPB 2000 milliGRAM(s) IV Intermittent every 12 hours  dextrose 5% + sodium chloride 0.45%. 1000 milliLiter(s) (60 mL/Hr) IV Continuous <Continuous>  enoxaparin Injectable 50 milliGRAM(s) SubCutaneous daily  pantoprazole    Tablet 40 milliGRAM(s) Oral before breakfast    MEDICATIONS  (PRN):            Allergies    No Known Allergies    Intolerances        REVIEW OF SYSTEMS:  unable to obtain fully due to mentation     ICU Vital Signs Last 24 Hrs  T(C): 36.6 (17 Feb 2022 10:52), Max: 36.7 (17 Feb 2022 04:46)  T(F): 97.8 (17 Feb 2022 10:52), Max: 98 (17 Feb 2022 04:46)  HR: 81 (17 Feb 2022 10:52) (52 - 81)  BP: 121/79 (17 Feb 2022 10:52) (111/73 - 129/75)  BP(mean): --  ABP: --  ABP(mean): --  RR: 18 (17 Feb 2022 10:52) (18 - 18)  SpO2: 92% (17 Feb 2022 10:52) (92% - 96%)      PHYSICAL EXAM:  GENERAL: NAD, well-groomed, well-developed, very thin, speaking in full sentences, still on nonrebreather  HEAD:  Atraumatic, Normocephalic  EYES: EOMI, PERRLA, conjunctiva and sclera clear  ENMT: No tonsillar erythema, exudates, or enlargement; Moist mucous membranes, Good dentition, No lesions  NECK: Supple, No JVD,  NERVOUS SYSTEM:  Alert & Oriented X1,   CHEST/LUNG: Clear to percussion bilaterally; No rales, rhonchi, wheezing, or rubs  HEART: Regular rate and rhythm; No murmurs, rubs, or gallops  ABDOMEN: Thin,, Soft, Nontender, Nondistended; Bowel sounds present  EXTREMITIES:  2+ Peripheral Pulses, No clubbing, cyanosis, or edema, right hallux and heal wtih dry eschars   SKIN: No rashes or lesions    LABS:                                                           11.8   8.69  )-----------( 165      ( 17 Feb 2022 08:31 )             38.6     02-17    140  |  111<H>  |  15  ----------------------------<  73  2.9<LL>   |  23  |  0.99    Ca    8.1<L>      17 Feb 2022 08:31  Phos  2.1     02-17  Mg     2.1     02-17      PTT - ( 16 Feb 2022 10:26 )  PTT:90.0 sec

## 2022-02-17 NOTE — SWALLOW BEDSIDE ASSESSMENT ADULT - H & P REVIEW
Patient is a 89M with a PMH of CHF?, prostate Ca who presents to the ED for dyspnea.  Patient currently AAOx1, unable to provide history.  Patient reportedly noted to have decreased appetite and wheezing by his HHA today who called EMS.  No current complaints.  SpO2 currently 96 on 4L via NC.  Vitals stable, labs show mild leukocytosis.  Will admit to tele/yes

## 2022-02-17 NOTE — PROGRESS NOTE ADULT - SUBJECTIVE AND OBJECTIVE BOX
Patient is a 89y old  Male who presents with a chief complaint of dyspnea. (16 Feb 2022 19:30)    PAST MEDICAL & SURGICAL HISTORY:  HTN (hypertension)    CHF (congestive heart failure)    Prostate CA    History of tonsillectomy    INTERVAL HISTORY: Dyspnea mildly improved, on 100% NRB, appears more comfortable in no acute distress.  	  MEDICATIONS:  MEDICATIONS  (STANDING):  ceFAZolin   IVPB 2000 milliGRAM(s) IV Intermittent every 12 hours  dextrose 5% + sodium chloride 0.45%. 1000 milliLiter(s) (60 mL/Hr) IV Continuous <Continuous>  enoxaparin Injectable 50 milliGRAM(s) SubCutaneous daily  pantoprazole    Tablet 40 milliGRAM(s) Oral before breakfast  potassium chloride   Powder 40 milliEquivalent(s) Oral once  potassium chloride  10 mEq/100 mL IVPB 10 milliEquivalent(s) IV Intermittent every 1 hour    Vitals:  T(F): 97.8 (02-17-22 @ 10:52), Max: 98 (02-17-22 @ 04:46)  HR: 81 (02-17-22 @ 10:52) (52 - 92)  BP: 121/79 (02-17-22 @ 10:52) (111/73 - 129/75)  RR: 18 (02-17-22 @ 10:52) (18 - 18)  SpO2: 92% (02-17-22 @ 10:52) (92% - 96%)  02-16 @ 07:01  -  02-17 @ 07:00  --------------------------------------------------------  IN:    dextrose 5% + sodium chloride 0.45%: 1200 mL  Total IN: 1200 mL    OUT:  Total OUT: 0 mL    Total NET: 1200 mL    PHYSICAL EXAM:  Neuro: Awake, more responsive, in no acute distress.  CV: S1 S2 RRR  Lungs: Diminished bases bilaterally.  GI: Soft, BS +, ND, NT  Extremities: Trace LE edema. Right great toe tip necrosis, Rt heel eschar. Right leg contracted.    RADIOLOGY:     < from: CT Angio Chest PE Protocol w/ IV Cont (02.13.22 @ 21:41) >  IMPRESSION:    Pulmonary embolism in the segmentalright lower lobe (6:301-303).    Right lung patchy consolidations suspicious for infection. Correlate   clinically and follow to resolution.  Findings were discussed with Dr. Noland 2/13/2022 10:36 PM by Dr. Arcos with read back confirmation.    < end of copied text >    < from: Xray Chest 1 View- PORTABLE-Urgent (02.13.22 @ 18:37) >  IMPRESSION: Right perihilar infiltrate. Severe kyphoscoliosis.    < end of copied text >    < from: Xray Foot AP + Lateral + Oblique, Right (02.16.22 @ 09:12) >  INTERPRETATION:  Clinical history: 89-year-old male, right foot eschars.    Three views of the right foot demonstrate mild degenerative change with   no fracture, dislocation, gross cortical destruction or soft tissue   emphysema.    Moderate hammertoe deformities noted.    IMPRESSION:    No acute radiographic findings    < end of copied text >    DIAGNOSTIC TESTING:    [X] Echocardiogram:     < from: TTE Echo Doppler w/o Cont (02.12.18 @ 15:31) >  Left Ventricle: Normal left ventricular size and wall thicknesses, with   normal systolic and diastolic function.  Left ventricular ejection fraction, by visual estimation, is 60 to 65%.  Right Ventricle: Normal right ventricular size and function.  Left Atrium: The left atrium is normal in size. Normal left atrial size.  Right Atrium: The right atrium is normal in size. Normal right atrial   size.  Pericardium: There is no evidence of pericardial effusion.  Mitral Valve: Structurally normal mitral valve, with normal leaflet   excursion. Thickening and calcification of the anterior and posterior   mitral valve leaflets. Trace mitral valve regurgitation is seen.  Tricuspid Valve: Structurally normal tricuspid valve, with normal leaflet   excursion. The tricuspid valve is normal in structure. Mild tricuspid   regurgitation is visualized.  Aortic Valve: Normal trileaflet aortic valve with normal opening. Mild   aortic valve regurgitation is seen.  Pulmonic Valve: Structurally normal pulmonic valve, with normal leaflet   excursion. Trace pulmonicvalve regurgitation.  Aorta: The aortic root and ascending aorta are structurally normal, with   no evidence of dilitation.  Pulmonary Artery: The main pulmonary artery is normal in size.    Summary:   1. Left ventricular ejection fraction, by visual estimation, is 60 to   65%.   2. Normal left ventricular size and wall thicknesses, with normal   systolic and diastolic function.   3. Normal right ventricular size and function.   4. The left atrium is normal in size.   5. Normal left atrial size.   6. Normal right atrial size.   7. The right atrium is normal in size.   8. There is no evidence of pericardial effusion.   9. Thickening and calcification of the anterior and posterior mitral   valve leaflets.  10. Trace mitral valve regurgitation.  11. Mild aortic, tricuspid regurgitation.  12. Normal trileaflet aortic valve with normal opening.    [id number] Aníbal Samayoa MD, [facility]  Electronically signed on 2/13/2018 at 10:03:29 AM    < end of copied text >    LABS:	 	    17 Feb 2022 08:31    140    |  111    |  15     ----------------------------<  73     2.9     |  23     |  0.99   16 Feb 2022 10:26    139    |  110    |  22     ----------------------------<  54     4.2     |  23     |  1.07   15 Feb 2022 11:59    141    |  110    |  36     ----------------------------<  70     3.5     |  24     |  1.18     Ca    8.1        17 Feb 2022 08:31  Phos  2.1       17 Feb 2022 08:31  Mg     2.1       17 Feb 2022 08:31    TPro  6.0    /  Alb  1.5    /  TBili  0.5    /  DBili  x      /  AST  54     /  ALT  43     /  AlkPhos  90     15 Feb 2022 11:59                        11.8   8.69  )-----------( 165      ( 17 Feb 2022 08:31 )             38.6 ,                       12.4   8.90  )-----------( 142      ( 16 Feb 2022 10:26 )             40.2 ,                       11.6   9.20  )-----------( 115      ( 15 Feb 2022 11:59 )             38.2   Lipid Profile: 02-15 @ 17:02  HDL Chol:              32 mg/dL  Serum Chol:            85 mg/dL  Triglycerides:         72 mg/dL               Patient is a 89y old  Male who presents with a chief complaint of dyspnea. (16 Feb 2022 19:30)    PAST MEDICAL & SURGICAL HISTORY:  HTN (hypertension)    CHF (congestive heart failure)    Prostate CA    History of tonsillectomy    INTERVAL HISTORY: Dyspnea mildly improved, on 100% NRB, appears more comfortable in no acute distress.  	  MEDICATIONS:  MEDICATIONS  (STANDING):  ceFAZolin   IVPB 2000 milliGRAM(s) IV Intermittent every 12 hours  dextrose 5% + sodium chloride 0.45%. 1000 milliLiter(s) (60 mL/Hr) IV Continuous <Continuous>  enoxaparin Injectable 50 milliGRAM(s) SubCutaneous daily  pantoprazole    Tablet 40 milliGRAM(s) Oral before breakfast  potassium chloride   Powder 40 milliEquivalent(s) Oral once  potassium chloride  10 mEq/100 mL IVPB 10 milliEquivalent(s) IV Intermittent every 1 hour    Vitals:  T(F): 97.8 (02-17-22 @ 10:52), Max: 98 (02-17-22 @ 04:46)  HR: 81 (02-17-22 @ 10:52) (52 - 92)  BP: 121/79 (02-17-22 @ 10:52) (111/73 - 129/75)  RR: 18 (02-17-22 @ 10:52) (18 - 18)  SpO2: 92% (02-17-22 @ 10:52) (92% - 96%)  02-16 @ 07:01  -  02-17 @ 07:00  --------------------------------------------------------  IN:    dextrose 5% + sodium chloride 0.45%: 1200 mL  Total IN: 1200 mL    OUT:  Total OUT: 0 mL    Total NET: 1200 mL    PHYSICAL EXAM:  Neuro: Awake, more responsive, in no acute distress.  CV: S1 S2 RRR  Lungs: Diminished bases bilaterally.  GI: Soft, BS +, ND, NT  Extremities: Trace LE edema. Right great toe tip necrosis, Rt heel eschar.     RADIOLOGY:     < from: CT Angio Chest PE Protocol w/ IV Cont (02.13.22 @ 21:41) >  IMPRESSION:    Pulmonary embolism in the segmental right lower lobe (6:301-303).    Right lung patchy consolidations suspicious for infection. Correlate   clinically and follow to resolution.    < end of copied text >    < from: Xray Chest 1 View- PORTABLE-Urgent (02.13.22 @ 18:37) >    IMPRESSION: Right perihilar infiltrate. Severe kyphoscoliosis.    < end of copied text >    < from: Xray Foot AP + Lateral + Oblique, Right (02.16.22 @ 09:12) >  INTERPRETATION:  Clinical history: 89-year-old male, right foot eschars.    Three views of the right foot demonstrate mild degenerative change with   no fracture, dislocation, gross cortical destruction or soft tissue   emphysema.    Moderate hammertoe deformities noted.    IMPRESSION:    No acute radiographic findings    < end of copied text >    DIAGNOSTIC TESTING:    [X] Echocardiogram:     < from: TTE Echo Doppler w/o Cont (02.12.18 @ 15:31) >  Left Ventricle: Normal left ventricular size and wall thicknesses, with   normal systolic and diastolic function.  Left ventricular ejection fraction, by visual estimation, is 60 to 65%.  Right Ventricle: Normal right ventricular size and function.  Left Atrium: The left atrium is normal in size. Normal left atrial size.  Right Atrium: The right atrium is normal in size. Normal right atrial   size.  Pericardium: There is no evidence of pericardial effusion.  Mitral Valve: Structurally normal mitral valve, with normal leaflet   excursion. Thickening and calcification of the anterior and posterior   mitral valve leaflets. Trace mitral valve regurgitation is seen.  Tricuspid Valve: Structurally normal tricuspid valve, with normal leaflet   excursion. The tricuspid valve is normal in structure. Mild tricuspid   regurgitation is visualized.  Aortic Valve: Normal trileaflet aortic valve with normal opening. Mild   aortic valve regurgitation is seen.  Pulmonic Valve: Structurally normal pulmonic valve, with normal leaflet   excursion. Trace pulmonic valve regurgitation.  Aorta: The aortic root and ascending aorta are structurally normal, with   no evidence of dilitation.  Pulmonary Artery: The main pulmonary artery is normal in size.    Summary:   1. Left ventricular ejection fraction, by visual estimation, is 60 to   65%.   2. Normal left ventricular size and wall thicknesses, with normal   systolic and diastolic function.   3. Normal right ventricular size and function.   4. The left atrium is normal in size.   5. Normal left atrial size.   6. Normal right atrial size.   7. The right atrium is normal in size.   8. There is no evidence of pericardial effusion.   9. Thickening and calcification of the anterior and posterior mitral   valve leaflets.  10. Trace mitral valve regurgitation.  11. Mild aortic, tricuspid regurgitation.  12. Normal trileaflet aortic valve with normal opening.    < end of copied text >    LABS:	 	    17 Feb 2022 08:31    140    |  111    |  15     ----------------------------<  73     2.9     |  23     |  0.99   16 Feb 2022 10:26    139    |  110    |  22     ----------------------------<  54     4.2     |  23     |  1.07   15 Feb 2022 11:59    141    |  110    |  36     ----------------------------<  70     3.5     |  24     |  1.18     Ca    8.1        17 Feb 2022 08:31  Phos  2.1       17 Feb 2022 08:31  Mg     2.1       17 Feb 2022 08:31    TPro  6.0    /  Alb  1.5    /  TBili  0.5    /  DBili  x      /  AST  54     /  ALT  43     /  AlkPhos  90     15 Feb 2022 11:59                        11.8   8.69  )-----------( 165      ( 17 Feb 2022 08:31 )             38.6 ,                       12.4   8.90  )-----------( 142      ( 16 Feb 2022 10:26 )             40.2 ,                       11.6   9.20  )-----------( 115      ( 15 Feb 2022 11:59 )             38.2   Lipid Profile: 02-15 @ 17:02  HDL Chol:              32 mg/dL  Serum Chol:            85 mg/dL  Triglycerides:         72 mg/dL

## 2022-02-17 NOTE — PROGRESS NOTE ADULT - ASSESSMENT
Patient is a 89M with a PMH of CHF?, prostate Ca who presents to the ED for dyspnea.  Patient currently AAOx1, unable to provide history.  Patient reportedly noted to have decreased appetite and wheezing by his HHA today who called EMS.  No current complaints.  SpO2 currently 96 on 4L via NC.  Vitals stable, labs show mild leukocytosis.    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   Even if foot is not the source, he needs good wound care and podiatric treatment     2/16: no fever, on NRB, + bacteremia, repeat BCs are pending, TTE pending, abx changed to cefazolin according to the sensitivity  2/17: remains afebrile, on NRB, lactate is better 2.4, R foot xray with no findings, repeat BCs pending, cefazolin continued, TTE and CT a/p pending.     MSSA bacteremia   high serum lactate   CHF   functional quadraplegia     Plan:   continue Cefazolin IV 2 grams q 12 hrs  repeat blood cultures every 48 hrs until clear   Transthoracic Echocardiogram   trend lactate   trend wbc   CT a/p to look for focus of infection   caution with fluids if in fact has heart failure   frequent turns, offloading, and nutrition optimization to avoid bedsores

## 2022-02-17 NOTE — SWALLOW BEDSIDE ASSESSMENT ADULT - SLP GENERAL OBSERVATIONS
pt seen bedside, NRB in place alert and oriented to self - needed prompt for place. pt pleasantly confused noted frustration, he engaged with SLP for simple questions with po trials, and he verbalized wants. pt lower extremities contracted- and difficult to reposition. he didn't follow directions for oral mech exam and pt defensive to touch or care. noted hoarse vocal quality

## 2022-02-17 NOTE — SWALLOW BEDSIDE ASSESSMENT ADULT - SWALLOW EVAL: DIAGNOSIS
Case Management Assessment    Patient name Heena Reeder  Location [facility] 414/PPHP 295-73 MRN [id number]  : 1963 Date 2022       Current Admission Date: 2022  Current Admission Diagnosis:Coronary artery disease involving native coronary artery of native heart with angina pectoris [facility])   Patient Active Problem List    Diagnosis Date Noted    Leukocytosis 2022    S/P CABG x 1 2022    Ventricular tachycardia ([facility] [address] ) 2022    NSTEMI (non-ST elevated myocardial infarction) ([facility] [address] ) 2021    Episodic cluster headache, not intractable 2021    Chronic pain syndrome 2021    Adenopathy 11/15/2018    Mixed hyperlipidemia 2018    Chest pain with high risk for cardiac etiology 2016    Primary hypertension     Old myocardial infarct     Anxiety with depression     Coronary artery disease involving native coronary artery of native heart with angina pectoris ([facility] )     Anxiety       LOS (days): 1  Geometric Mean LOS (GMLOS) (days): 6 00  Days to GMLOS:4 8     OBJECTIVE:  PATIENT READMITTED TO HOSPITAL  Risk of Unplanned Readmission Score: 12      Current admission status: Inpatient    Preferred Pharmacy:   [address] [place], [place] [address] , [address] [address]  Phone: [phone] Fax: [phone]    Primary Care Provider: Gato Cheatham DO    Primary Insurance: [address] MEDICARE [facility] RE[facility]  Secondary Insurance: [facility]    ASSESSMENT:  Active Health Care Agents    There are no active Health Care Agents on file         Advance Directives  Does patient have a [address]?: No  Does patient currently have a Health Care decision maker?: No  Does patient have Advance Directives?: No  Primary Contact: Misael Jiménez (Pt's wife)    Readmission Root Cause  30 Day Readmission: Yes  Who directed you to return to the hospital?: Other (comment) (planned readmission)  Did you understand whom to contact if you had questions or problems?: Yes  Did you get your prescriptions before you left the hospital?: Yes  Were you able to get your prescriptions filled when you left the hospital?: Yes  Did you take your medications as prescribed?: Yes  Were you able to get to your follow-up appointments?: Yes  During previous admission, was a post-acute recommendation made?: No  Patient was readmitted due to: planned readmission for cardiac surgery    Patient Information  Admitted from[de-identified] Home  Mental Status: Alert  Assessment information provided by[de-identified] Patient  Primary Caregiver: Self  Support Systems: Spouse/significant other,Son  [place] of Residence: [place] do you live in?: [place]/ [place]  entry access options   Select all that apply : Stairs  Number of steps to enter home : 4  Do the steps have railings?: No  Type of Current Residence: 2 story home  Upon entering residence, is there a bedroom on the main floor (no further steps)?: No  A bedroom is located on the following floor levels of residence (select all that apply):: 2nd Floor  Upon entering residence, is there a bathroom on the main floor (no further steps)?: Yes  Number of steps to 2nd floor from main floor: One Flight  In the last 12 months, was there a time when you were not able to pay the mortgage or rent on time?: No  In the last 12 months, how many places have you lived?: 1  In the last 12 months, was there a time when you did not have a steady place to sleep or slept in a shelter (including now)?: No  Homeless/housing insecurity resource given?: N/A  Living Arrangements: Lives w/ Spouse/significant other    Activities of Daily Living Prior to Admission  Functional Status: Independent  Completes ADLs independently?: Yes  Ambulates independently?: Yes  Does patient use assisted devices?: No  Does patient currently own DME?: No  Does patient have a history of Outpatient Therapy (PT/OT)?: No  Does the patient have a history of Short-Term Rehab?: No  Does patient have a history of HHC?: No  Does patient currently have [facility]?: No    Patient Information Continued  Income Source: SSI/SSD  Does patient have prescription coverage?: Yes  Within the past 12 months, you worried that your food would run out before you got the money to buy more : Never true  Within the past 12 months, the food you bought just didnt last and you didnt have money to get more : Never true  Food insecurity resource given?: N/A  Does patient receive dialysis treatments?: No  Does patient have a history of substance abuse?: No  Does patient have a history of Mental Health Diagnosis?: Yes (Depression, Anxiety)  Is patient receiving treatment for mental health?: Yes  Has patient received inpatient treatment related to mental health in the last 2 years?: No    Means of Transportation  Means of Transport to Appts[de-identified] Drives Self  In the past 12 months, has lack of transportation kept you from medical appointments or from getting medications?: No  In the past 12 months, has lack of transportation kept you from meetings, work, or from getting things needed for daily living?: No  Was application for public transport provided?: N/A     CM reviewed d/c planning process including the following: identifying help at home, patient preference for d/c planning needs, Discharge Lounge, [facility] Meds to Bed program, availability of treatment team to discuss questions or concerns patient and/or family may have regarding understanding medications and recognizing signs and symptoms once discharged  CM also encouraged patient to follow up with all recommended appointments after discharge  Patient advised of importance for patient and family to participate in managing patients medical well being  oropharyngeal dysphagia marked by reduced labial seal to spoon/cup, increased RADHA with munching pattern across textures, laryngeal palpation limited 2/2 pt defensiveness to touch however suspect delay in swallow trigger with decreased hyolaryngeal excursion. no overt signs of aspiration with consistencies trialed.

## 2022-02-17 NOTE — PROGRESS NOTE ADULT - SUBJECTIVE AND OBJECTIVE BOX
SUBJECTIVE AND OBJECTIVE: Patient resting in bed on NRB  INTERVAL HPI/OVERNIGHT EVENTS:    DNR on chart: yes  Allergies    No Known Allergies    Intolerances    MEDICATIONS  (STANDING):  atorvastatin 40 milliGRAM(s) Oral at bedtime  bicalutamide 50 milliGRAM(s) Oral daily  ceFAZolin   IVPB 2000 milliGRAM(s) IV Intermittent every 12 hours  dextrose 5% + sodium chloride 0.45%. 1000 milliLiter(s) (60 mL/Hr) IV Continuous <Continuous>  enoxaparin Injectable 50 milliGRAM(s) SubCutaneous daily  pantoprazole    Tablet 40 milliGRAM(s) Oral before breakfast  potassium phosphate / sodium phosphate Powder (PHOS-NaK) 1 Packet(s) Oral three times a day    MEDICATIONS  (PRN):      ITEMS UNCHECKED ARE NOT PRESENT    PRESENT SYMPTOMS: [ ]Unable to obtain due to poor mentation   Source if other than patient:  [ ]Family   [ ]Team     Pain:  [ ]yes [x ]no  QOL impact -   Location -                    Aggravating factors -  Quality -  Radiation -  Timing-  Severity (0-10 scale):  Minimal acceptable level (0-10 scale):     Dyspnea:                           [ ]Mild [ ]Moderate [ ]Severe  Anxiety:                             [ ]Mild [ ]Moderate [ ]Severe  Fatigue:                             [ ]Mild [ ]Moderate [ ]Severe  Nausea:                             [ ]Mild [ ]Moderate [ ]Severe  Loss of appetite:              [ ]Mild [ ]Moderate [ ]Severe  Constipation:                    [ ]Mild [ ]Moderate [ ]Severe    PAIN AD Score:	1  [email or web address]/painad.pdf (Ctrl + left click to view)    Other Symptoms:  [ ]All other review of systems negative     Palliative Performance Status Version 2:       20  %      http://npcrc.org/files/news/palliative_performance_scale_ppsv2.pdf  PHYSICAL EXAM:  Vital Signs Last 24 Hrs  T(C): 36.6 (17 Feb 2022 10:52), Max: 36.7 (17 Feb 2022 04:46)  T(F): 97.8 (17 Feb 2022 10:52), Max: 98 (17 Feb 2022 04:46)  HR: 81 (17 Feb 2022 10:52) (52 - 81)  BP: 121/79 (17 Feb 2022 10:52) (111/73 - 129/75)  BP(mean): --  RR: 18 (17 Feb 2022 10:52) (18 - 18)  SpO2: 92% (17 Feb 2022 10:52) (92% - 96%) I&O's Summary    16 Feb 2022 07:01  -  17 Feb 2022 07:00  --------------------------------------------------------  IN: 1200 mL / OUT: 0 mL / NET: 1200 mL       GENERAL:  [ x]Alert  [ ]Oriented x   [ ]Lethargic  [x ]Cachexia  [ ]Unarousable  [ x]Verbal  [ ]Non-Verbal  Behavioral:   [ ]Anxiety  [ ]Delirium [ ]Agitation [ ]Other  HEENT:  [ ]Normal   [x ]Dry mouth   [ ]ET Tube/Trach  [ ]Oral lesions  PULMONARY:   [ x]Clear on left side [ ]Tachypnea  [ ]Audible excessive secretions   [ ]Rhonchi        [ ]Right [ ]Left [ ]Bilateral  [ ]Crackles        [ ]Right [ ]Left [ ]Bilateral  [ ]Wheezing     [ ]Right [ ]Left [ ]Bilateral  [x ]Diminished BS [ x] Right [ ]Left [ ]Bilateral  CARDIOVASCULAR:    [x ]Regular [ ]Irregular [ ]Tachy  [ ]Rudolph [ ]Murmur [ ]Other  GASTROINTESTINAL:  [x ]Soft  [ ]Distended   [x ]+BS  [ ]Non tender [ ]Tender  [ ]PEG [ ]OGT/ NGT   Last BM:    GENITOURINARY:  [ ]Normal [ ]Incontinent   [ ]Oliguria/Anuria   [ ]Lutz  MUSCULOSKELETAL:   [ ]Normal   [ ]Weakness  [ ]Bed/Wheelchair bound [ ]Edema  NEUROLOGIC:   [ ]No focal deficits  [ ] Cognitive impairment  [ ] Dysphagia [ ]Dysarthria [ ] Paresis [ ]Other   SKIN:   [ ]Normal  [ ]Rash   [ ]Pressure ulcer(s) [ ]y [ ]n present on admission    CRITICAL CARE:  [ ]Shock Present  [ ]Septic [ ]Cardiogenic [ ]Neurologic [ ]Hypovolemic  [ ]Vasopressors [ ]Inotropes  [ ]Respiratory failure present [ ]Mechanical Ventilation [ ]Non-invasive ventilatory support [ ]High-Flow  [ ]Acute  [ ]Chronic [ ]Hypoxic  [ ]Hypercarbic [ ]Other  [ ]Other organ failure     LABS:                        11.8   8.69  )-----------( 165      ( 17 Feb 2022 08:31 )             38.6   02-17    140  |  111<H>  |  15  ----------------------------<  73  2.9<LL>   |  23  |  0.99    Ca    8.1<L>      17 Feb 2022 08:31  Phos  2.1     02-17  Mg     2.1     02-17    PTT - ( 16 Feb 2022 10:26 )  PTT:90.0 sec      RADIOLOGY & ADDITIONAL STUDIES:    Protein Calorie Malnutrition Present: [ ]mild [ ]moderate [ ]severe [ ]underweight [ ]morbid obesity  https://www.andeal.org/vault/2440/web/files/ONC/Table_Clinical%20Characteristics%20to%20Document%20Malnutrition-White%20JV%20et%20al%202012.pdf    Height (cm): 167.6 (02-13-22 @ 17:02), 167.6 (09-27-21 @ 11:29), 167.6 (04-12-21 @ 12:23)  Weight (kg): 44.4 (02-14-22 @ 01:14), 63.5 (09-27-21 @ 11:29), 40.8 (04-12-21 @ 12:23)  BMI (kg/m2): 15.8 (02-14-22 @ 01:14), 22.6 (02-13-22 @ 17:02), 22.6 (09-27-21 @ 11:29)    [ ]PPSV2 < or = 30%  [ ]significant weight loss [ ]poor nutritional intake [ ]anasarca    [ ]Artificial Nutrition    REFERRALS:   [ ]Chaplaincy  [ ]Hospice  [ ]Child Life  [ ]Social Work  [ ]Case management [ ]Holistic Therapy     Goals of Care Document:     SUBJECTIVE AND OBJECTIVE: Patient resting in bed on NRB  INTERVAL HPI/OVERNIGHT EVENTS:    DNR on chart: yes  Allergies    No Known Allergies    Intolerances    MEDICATIONS  (STANDING):  atorvastatin 40 milliGRAM(s) Oral at bedtime  bicalutamide 50 milliGRAM(s) Oral daily  ceFAZolin   IVPB 2000 milliGRAM(s) IV Intermittent every 12 hours  dextrose 5% + sodium chloride 0.45%. 1000 milliLiter(s) (60 mL/Hr) IV Continuous <Continuous>  enoxaparin Injectable 50 milliGRAM(s) SubCutaneous daily  pantoprazole    Tablet 40 milliGRAM(s) Oral before breakfast  potassium phosphate / sodium phosphate Powder (PHOS-NaK) 1 Packet(s) Oral three times a day    MEDICATIONS  (PRN):      ITEMS UNCHECKED ARE NOT PRESENT    PRESENT SYMPTOMS: [ ]Unable to obtain due to poor mentation   Source if other than patient:  [ ]Family   [ ]Team     Pain:  [ ]yes [x ]no  QOL impact -   Location -                    Aggravating factors -  Quality -  Radiation -  Timing-  Severity (0-10 scale):  Minimal acceptable level (0-10 scale):     Dyspnea:                           [ ]Mild [ ]Moderate [ ]Severe  Anxiety:                             [ ]Mild [ ]Moderate [ ]Severe  Fatigue:                             [ ]Mild [ ]Moderate [ ]Severe  Nausea:                             [ ]Mild [ ]Moderate [ ]Severe  Loss of appetite:              [ ]Mild [ ]Moderate [ ]Severe  Constipation:                    [ ]Mild [ ]Moderate [ ]Severe    PAIN AD Score:	1  [email or web address].[email or web address].[email or web address]/cog/painad.pdf (Ctrl + left click to view)    Other Symptoms:  [ ]All other review of systems negative     Palliative Performance Status Version 2:       20  %      http://npcrc.org/files/news/palliative_performance_scale_ppsv2.pdf  PHYSICAL EXAM:  Vital Signs Last 24 Hrs  T(C): 36.6 (17 Feb 2022 10:52), Max: 36.7 (17 Feb 2022 04:46)  T(F): 97.8 (17 Feb 2022 10:52), Max: 98 (17 Feb 2022 04:46)  HR: 81 (17 Feb 2022 10:52) (52 - 81)  BP: 121/79 (17 Feb 2022 10:52) (111/73 - 129/75)  BP(mean): --  RR: 18 (17 Feb 2022 10:52) (18 - 18)  SpO2: 92% (17 Feb 2022 10:52) (92% - 96%) I&O's Summary    16 Feb 2022 07:01  -  17 Feb 2022 07:00  --------------------------------------------------------  IN: 1200 mL / OUT: 0 mL / NET: 1200 mL       GENERAL:  [ x]Alert  [ ]Oriented x   [ ]Lethargic  [x ]Cachexia  [ ]Unarousable  [ x]Verbal  [ ]Non-Verbal  Behavioral:   [ ]Anxiety  [ ]Delirium [ ]Agitation [ ]Other  HEENT:  [ ]Normal   [x ]Dry mouth   [ ]ET Tube/Trach  [ ]Oral lesions  PULMONARY:   [ x]Clear on left side [ ]Tachypnea  [ ]Audible excessive secretions   [ ]Rhonchi        [ ]Right [ ]Left [ ]Bilateral  [ ]Crackles        [ ]Right [ ]Left [ ]Bilateral  [ ]Wheezing     [ ]Right [ ]Left [ ]Bilateral  [x ]Diminished BS [ x] Right [ ]Left [ ]Bilateral  CARDIOVASCULAR:    [x ]Regular [ ]Irregular [ ]Tachy  [ ]Rudolph [ ]Murmur [ ]Other  GASTROINTESTINAL:  [x ]Soft  [ ]Distended   [x ]+BS  [ ]Non tender [ ]Tender  [ ]PEG [ ]OGT/ NGT   Last BM:    GENITOURINARY:  [ ]Normal [x ]Incontinent   [ ]Oliguria/Anuria   [ ]Lutz  MUSCULOSKELETAL:   [ ]Normal   [ ]Weakness  [x ]Bed/Wheelchair bound [ ]Edema  NEUROLOGIC:   [ ]No focal deficits  [x ] Cognitive impairment  [ ] Dysphagia [ ]Dysarthria [ ] Paresis [ ]Other   SKIN:   [ ]Normal  [ ]Rash   [x ]Pressure ulcer(s) right great toe unstageable, right heel unstageable, sacrum healed [ ]y [ ]n present on admission    CRITICAL CARE:  [ ]Shock Present  [ ]Septic [ ]Cardiogenic [ ]Neurologic [ ]Hypovolemic  [ ]Vasopressors [ ]Inotropes  [ ]Respiratory failure present [ ]Mechanical Ventilation [ ]Non-invasive ventilatory support [ ]High-Flow  [ ]Acute  [ ]Chronic [ ]Hypoxic  [ ]Hypercarbic [ ]Other  [ ]Other organ failure     LABS:                        11.8   8.69  )-----------( 165      ( 17 Feb 2022 08:31 )             38.6   02-17    140  |  111<H>  |  15  ----------------------------<  73  2.9<LL>   |  23  |  0.99    Ca    8.1<L>      17 Feb 2022 08:31  Phos  2.1     02-17  Mg     2.1     02-17    PTT - ( 16 Feb 2022 10:26 )  PTT:90.0 sec      RADIOLOGY & ADDITIONAL STUDIES: none new    Protein Calorie Malnutrition Present: [ ]mild [ ]moderate [ x]severe [ ]underweight [ ]morbid obesity  https://www.andeal.org/vault/2440/web/files/ONC/Table_Clinical%20Characteristics%20to%20Document%20Malnutrition-White%20JV%20et%20al%202012.pdf    Height (cm): 167.6 (02-13-22 @ 17:02), 167.6 (09-27-21 @ 11:29), 167.6 (04-12-21 @ 12:23)  Weight (kg): 44.4 (02-14-22 @ 01:14), 63.5 (09-27-21 @ 11:29), 40.8 (04-12-21 @ 12:23)  BMI (kg/m2): 15.8 (02-14-22 @ 01:14), 22.6 (02-13-22 @ 17:02), 22.6 (09-27-21 @ 11:29)    [x ]PPSV2 < or = 30%  [ ]significant weight loss [ x]poor nutritional intake [ ]anasarca    [ ]Artificial Nutrition    REFERRALS:   [ ]Chaplaincy  [ ]Hospice  [ ]Child Life  [ ]Social Work  [ ]Case management [ ]Holistic Therapy     Goals of Care Document:

## 2022-02-17 NOTE — PROGRESS NOTE ADULT - SUBJECTIVE AND OBJECTIVE BOX
INTERVAL HPI:   89M with HTN, CHF?, Prostate Ca who presents to the ED for dyspnea, wheezing,  wet cough, and decreased appetite for two weeks.  Found to have PE, Pneumonia, Dehydration, hypernatremia and Renal insuffiencey. Blood cultures are growing staph Aureus.  Not able to provide more details.  Started on IV heparin, IV fluids, NRB mask and antibiotics.     OVERNIGHT EVENTS:  Well responsive, still on NRB mask    Vital Signs Last 24 Hrs  T(C): 36.6 (17 Feb 2022 10:52), Max: 36.7 (17 Feb 2022 04:46)  T(F): 97.8 (17 Feb 2022 10:52), Max: 98 (17 Feb 2022 04:46)  HR: 81 (17 Feb 2022 10:52) (52 - 92)  BP: 121/79 (17 Feb 2022 10:52) (111/73 - 129/75)  BP(mean): --  RR: 18 (17 Feb 2022 10:52) (18 - 18)  SpO2: 92% (17 Feb 2022 10:52) (92% - 96%)    PHYSICAL EXAM:  GEN:         Awake, responsive and comfortable.  HEENT:    Normal.    RESP:       no distress  CVS:         Regular rate and rhythm.     MEDICATIONS  (STANDING):  ceFAZolin   IVPB 2000 milliGRAM(s) IV Intermittent every 12 hours  dextrose 5% + sodium chloride 0.45%. 1000 milliLiter(s) (60 mL/Hr) IV Continuous <Continuous>  enoxaparin Injectable 50 milliGRAM(s) SubCutaneous daily  pantoprazole    Tablet 40 milliGRAM(s) Oral before breakfast  potassium chloride   Powder 40 milliEquivalent(s) Oral once  potassium chloride  10 mEq/100 mL IVPB 10 milliEquivalent(s) IV Intermittent every 1 hour    LABS:                        11.8   8.69  )-----------( 165      ( 17 Feb 2022 08:31 )             38.6     02-17    140  |  111<H>  |  15  ----------------------------<  73  2.9<LL>   |  23  |  0.99    Ca    8.1<L>      17 Feb 2022 08:31  Phos  2.1     02-17  Mg     2.1     02-17    TPro  6.0  /  Alb  1.5<L>  /  TBili  0.5  /  DBili  x   /  AST  54<H>  /  ALT  43  /  AlkPhos  90  02-15    PTT - ( 16 Feb 2022 10:26 )  PTT:90.0 sec  02-14 @ 17:42  pH: 7.40  pCO2: 39  pO2: 121  SaO2: 99.4  02-13 @ 19:38  pH: 7.46  pCO2: 35  pO2: 85  SaO2: 97.6  ASSESSMENT AND PLAN:  ·	Acute PE.  ·	RLL pneumonia.  ·	Staph Aureus bacteremia.  ·	Dehydration.  ·	Hypernatremia.  ·	Renal Insuffiencey  ·	Prostate CA.  ·	HTN.    RT was unable to get ABG.  SPO2 in low 90s on NRB mask,  Continue IV hydration.  Replace potassium, follow electrolytes.  Antibiotics per ID

## 2022-02-17 NOTE — PROGRESS NOTE ADULT - ASSESSMENT
89 year old male PMH HTN, heart failure, prostate cancer presented with dyspnea and found to have PE and bacteremic with s. aureus.  Patient on NRB.  Palliative care consulted for GOC.

## 2022-02-17 NOTE — PROGRESS NOTE ADULT - ASSESSMENT
89M with HTN, HLD, CHF?, Prostate Ca, EGD showed a duodenal ulcer in 2015, who presents to the ED for dyspnea, wheezing,  wet cough, and decreased appetite for two weeks.  Found to have PE, Pneumonia, Dehydration, hypernatremia and Renal insuffiencey.  Trop 185->105  blood cultures positive for SOHAM  Remains on 100% NRB   Patient remains restless at times with hand mittens for pt safety. Restlessness improving.    ·	Acute dyspnea/failure to thrive  ·	MSSA bacteremia   ·	RLL Pulmonary Embolism  ·	Elevated cardiac enzymes - no concomitant evidence of acute ischemia clinically or on ECG - likely type II MI in setting of infection/PE.    Plan:  TTE with preserved EF, Normal right ventricular size and function.  Switched IV heparin to SQ Lovenox 50 MG full dose for PE. Cautious dosing given history of multiple GI bleeds, monitor H/H closely. On PPI (pantaprazole 40 mg).  LFTs improving, recheck in AM- Can restart statins today.   Elevated troponins now downtrending, likely type II MI. ASA relatively contraindicated due to recurrent GI bleed in the past. No evidence of WMA in ECho that would lead us to consider ischemic substrate.   Holding Lasix and aldactone now in setting of hypernatremia and dehydration, no overt fluid overload noted. Hypernatremia /AMNA now resolved with IV hydration.  Cont supplemental O2, wean as tolerated.  Continue treatment for Bacteremia with antibiotics as per medicine/ ID. Lactate 2.4, downtrending.  Pulmonology f/u appreciated for acute PE.   Podiatry f/u appreciated for right foot eschar.   Palliative care eval appreciated, DNR status. 89M with HTN, HLD, CHF?, Prostate Ca, EGD showed a duodenal ulcer in 2015, who presents to the ED for dyspnea, wheezing,  wet cough, and decreased appetite for two weeks.  Found to have PE, Pneumonia, Dehydration, hypernatremia and Renal insuffiencey.  Trop 185->105  blood cultures positive for SOHAM  Remains on 100% NRB   Patient remains restless at times with hand mittens for pt safety. Restlessness improving.    ·	Acute dyspnea/failure to thrive  ·	MSSA bacteremia   ·	RLL Pulmonary Embolism  ·	Elevated cardiac enzymes - no concomitant evidence of acute ischemia clinically or on ECG - likely type II MI in setting of infection/PE.    Plan:  TTE with preserved EF, Normal right ventricular size and function.  Currently on SQ Lovenox for AC for PE. Cautious dosing given history of multiple GI bleeds, monitor H/H closely. On PPI (pantaprazole 40 mg).  LFTs improving, recheck in AM- Can restart statins   Elevated troponins now downtrending, likely type II MI. ASA relatively contraindicated due to recurrent GI bleed in the past. No evidence of WMA in ECho that would lead us to consider ischemic substrate.   Holding Lasix and aldactone now in setting of hypernatremia and dehydration, no overt fluid overload noted. Hypernatremia /AMNA now resolved with IV hydration.  Cont supplemental O2, wean as tolerated.  Continue treatment for Bacteremia with antibiotics as per medicine/ ID. Lactate 2.4, downtrending.  Pulmonology f/u appreciated for acute PE.   Podiatry following for right foot eschar.   Palliative care eval appreciated, DNR status noted.  only follow s needed 89M with HTN, HLD, CHF?, Prostate Ca, EGD showed a duodenal ulcer in 2015, who presents to the ED for dyspnea, wheezing,  wet cough, and decreased appetite for two weeks.  Found to have PE, Pneumonia, Dehydration, hypernatremia and Renal insuffiencey.  Trop 185->105  blood cultures positive for SOHAM  Remains on 100% NRB   Patient remains restless at times with hand mittens for pt safety. Restlessness improving.    ·	Acute dyspnea/failure to thrive  ·	MSSA bacteremia   ·	RLL Pulmonary Embolism  ·	Elevated cardiac enzymes - no concomitant evidence of acute ischemia clinically or on ECG - likely type II MI in setting of infection/PE.    Plan:  TTE with preserved EF, Normal right ventricular size and function.  Currently on SQ Lovenox for AC for PE. Cautious dosing given history of multiple GI bleeds, monitor H/H closely. On PPI (pantaprazole 40 mg).  LFTs improving, recheck in AM- Can restart statins   Elevated troponins now downtrending, likely type II MI. ASA relatively contraindicated due to recurrent GI bleed in the past. No evidence of WMA in ECho that would lead us to consider ischemic substrate.   Holding Lasix and aldactone now in setting of hypernatremia and dehydration, no overt fluid overload noted. Hypernatremia /AMNA now resolved with IV hydration.  Cont supplemental O2, wean as tolerated.  Continue treatment for Bacteremia with antibiotics as per medicine/ ID. Lactate 2.4, downtrending.  Pulmonology f/u appreciated for acute PE.   Podiatry following for right foot eschar.   Palliative care eval appreciated, DNR status noted.  only follow as needed 89M with HTN, HLD, CHF?, Prostate Ca, EGD showed a duodenal ulcer in 2015, who presents to the ED for dyspnea, wheezing,  wet cough, and decreased appetite for two weeks.  Found to have PE, Pneumonia, Dehydration, hypernatremia and Renal insuffiencey.  Trop 185->105  blood cultures positive for SOHAM  Remains on 100% NRB   Patient remains restless at times with hand mittens for pt safety. Restlessness improving.    ·	Acute dyspnea/failure to thrive  ·	MSSA bacteremia   ·	RLL Pulmonary Embolism  ·	Elevated cardiac enzymes - no concomitant evidence of acute ischemia clinically or on ECG - likely type II MI in setting of infection/PE.    Plan:  TTE with preserved EF, Normal right ventricular size and function.  Currently on SQ Lovenox for AC for PE. Cautious dosing given history of multiple GI bleeds, monitor H/H closely. On PPI (pantaprazole 40 mg).  LFTs improving, recheck in AM- Can restart statins   Elevated troponins now downtrending, likely type II MI. ASA relatively contraindicated due to recurrent GI bleed in the past. No evidence of WMA in ECho that would lead us to consider ischemic substrate.   Holding Lasix and aldactone now in setting of hypernatremia and dehydration, no overt fluid overload noted. Hypernatremia /AMNA now resolved with IV hydration.  Cont supplemental O2, wean as tolerated.  Continue treatment for Bacteremia with antibiotics as per medicine/ ID. Lactate 2.4, downtrending.  Pulmonology f/u appreciated for acute PE.   Podiatry following for right foot eschar.   Palliative care eval appreciated, DNR status noted.  will only follow as needed

## 2022-02-17 NOTE — SWALLOW BEDSIDE ASSESSMENT ADULT - COMMENTS
CT angio chest w/ IV contrast 2/13/2022 IMPRESSION:Pulmonary embolism in the segmental right lower lobe (6:301-303).  Right lung patchy consolidations suspicious for infection. Correlate clinically and follow to resolution.

## 2022-02-17 NOTE — PROGRESS NOTE ADULT - ASSESSMENT
a 89M with a PMH of CHF?, prostate Ca who presents to the ED for dyspnea.  Patient currently AAOx1, unable to provide history.  Patient reportedly noted to have decreased appetite and wheezing by his HHA today who called EMS.  No current complaints.  SpO2 currently 96 on 4L via NC.  Vitals stable, labs show mild leukocytosis.  Will admit to tele.         Metabolic encephalopathy.    Hx of failure to thrive   ·  Plan: Unclear baseline mental status.  Patient presents with hypothermia,   As per son- was congested/ not eating much   Usually pt is bedbound on a wheelchair for most of the day-mainly due to general weakness for over more than 6months, has dementia  Hx of shin wound- and was told not to move around too much for the wound to heal     -Pt improving, speaking in full sentences, passed speech and swallow- on pureed diet    Bacteremia  -continue IV antibiotics-continue Zosyn  Lactic acid decreasing  -As per Podiatry- less likely source of infection being the right hallux, awaiting official xray reports  -CT of chest reviewed- PNA   -Ct of a/p pending     Acute pulmonary embolus.   switched heparin drip to lovenox (theraputic dose) since pt agitated and not easy stick  Will need to switch to a DOAC but will need to estimate risks/vs benefits given hx of gastric ulcers and hx of bleeding in past     RLL Pneumonia.   Underlying COPD  continue Zosyn   -continues on nonrebreather   -Pulm following pt     Troponin level elevated.   - Troponin elevated-most likely demand ischemia  No current chest pain.   As per Cardiology - "no concomitant evidence of acute ischemia clinically or on ECG - likely type II MI in setting of CAP/PE."       Chronic CHF.   - spironolactone (on hold)   Holding Lasix and aldactone now in setting of hypernatremia and dehydration   -reviewed echo-preserved EF  ASA relatively contraindicated due to recurrent GI bleed in the past  Bbl relatively contraindicated due to emphysema, smoking history  will restart on Statin          AMNA (acute kidney injury).   -improved with fluids      : Hypernatremia.   -resolved  Hypokalemia  -continue to supplement and monitor       Prostate cancer.   ·  Plan: bicalutamide.      Right hallux and heal wounds   -continue wound care  -follow up with Podiatry   -xray no acute findings     dvtppx- Lovenox therapeutic dose         Stephane Jason- [phone]  GOC- MOLTs signed--again- pt is DNR/DNI

## 2022-02-17 NOTE — PROGRESS NOTE ADULT - SUBJECTIVE AND OBJECTIVE BOX
SANDRA ROCHE  MRN-[id number]      Interval History: The pt was seen and examined earlier, no distress, more awake today, able to state his name and birthday, unable to state the place and the year. The pt is on NRB, afebrile, no WBC.     PAST MEDICAL & SURGICAL HISTORY:  HTN (hypertension)    CHF (congestive heart failure)    Prostate CA    History of tonsillectomy        ROS:  limited, pt is somewhat confused, denies any pain, denies abd pain, no N/V, no diarrhea   [ ] Unobtainable because:  [ x] All other systems negative    Constitutional: no fever, no chills  Head: no trauma  Eyes: no vision changes, no eye pain  ENT:  no sore throat, no rhinorrhea  Cardiovascular:  no chest pain, no palpitation  Respiratory:  no SOB, no cough  GI:  no abd pain, no vomiting, no diarrhea  urinary: no dysuria, no hematuria, no flank pain  musculoskeletal:  no joint pain, no joint swelling  skin:  no rash  neurology:  no headache, no seizure, no change in mental status  psych: no anxiety, no depression         Allergies  No Known Allergies        ANTIMICROBIALS:  ceFAZolin   IVPB 2000 every 12 hours      OTHER MEDS:  atorvastatin 20 milliGRAM(s) Oral at bedtime  bicalutamide 50 milliGRAM(s) Oral daily  dextrose 5% + sodium chloride 0.45%. 1000 milliLiter(s) IV Continuous <Continuous>  enoxaparin Injectable 50 milliGRAM(s) SubCutaneous daily  pantoprazole    Tablet 40 milliGRAM(s) Oral before breakfast  potassium phosphate / sodium phosphate Powder (PHOS-NaK) 1 Packet(s) Oral three times a day      Vital Signs Last 24 Hrs  T(C): 36.6 (17 Feb 2022 10:52), Max: 36.7 (17 Feb 2022 04:46)  T(F): 97.8 (17 Feb 2022 10:52), Max: 98 (17 Feb 2022 04:46)  HR: 81 (17 Feb 2022 10:52) (52 - 81)  BP: 121/79 (17 Feb 2022 10:52) (121/79 - 129/75)  BP(mean): --  RR: 18 (17 Feb 2022 10:52) (18 - 18)  SpO2: 92% (17 Feb 2022 10:52) (92% - 94%)    Physical Exam:  Constitutional: non-toxic, no distress was on NRB  HEAD/EYES: anicteric, no conjunctival injection  ENT:  supple, no thrush, dry mouth  Cardiovascular:   normal S1, S2, no murmur, no edema  Respiratory:  diminished BS bilaterally, no wheezes, no rales  GI:  soft, non-tender, normal bowel sounds  Musculoskeletal:  no synovitis, normal ROM, right hallux dry gangrene   Neurologic: awake and alert, able to say his name and birthday, follows simple commands   Skin:  no rash, no erythema, no phlebitis  Heme/Onc: no cervical lymphadenopathy   Psychiatric:  appropriate mood    WBC Count: 8.69 K/uL (02-17 @ 08:31)  WBC Count: 8.90 K/uL (02-16 @ 10:26)  WBC Count: 9.20 K/uL (02-15 @ 11:59)  WBC Count: 11.21 K/uL (02-14 @ 06:33)  WBC Count: 11.45 K/uL (02-13 @ 19:15)                            11.8   8.69  )-----------( 165      ( 17 Feb 2022 08:31 )             38.6       02-17    140  |  112<H>  |  12  ----------------------------<  84  4.0   |  26  |  0.97    Ca    8.1<L>      17 Feb 2022 16:05  Phos  2.1     02-17  Mg     2.1     02-17            Creatinine Trend: 0.97<--, 0.99<--, 1.07<--, 1.18<--, 1.46<--, 2.01<--  Lactate, Blood: 2.4 mmol/L (02-17-22 @ 08:32)  Lactate, Blood: 3.3 mmol/L (02-16-22 @ 20:29)  Lactate, Blood: 5.3 mmol/L (02-16-22 @ 16:20)  Lactate, Blood: 6.1 mmol/L (02-16-22 @ 10:26)      MICROBIOLOGY:  v  .Blood Blood  02-14-22   Growth in aerobic and anaerobic bottles: Staphylococcus aureus  ***Blood Panel PCR results on this specimen are available  approximately 3 hours after the Gram stain result.***  Gram stain, PCR, and/or culture results may not always  correspond dueto difference in methodologies.  ************************************************************  This PCR assay was performed by multiplex PCR. This  Assay tests for 66 bacterial and resistance gene targets.  Please refer to the [facility] test directory  at [email or web address] for details.  --  Blood Culture PCR  Staphylococcus aureus    D-Dimer Assay, Quantitative: 6004 (02-13)      SARS-CoV-2 Result: NotDetec (02-13-22 @ 19:15)      RADIOLOGY:

## 2022-02-18 LAB
ALBUMIN SERPL ELPH-MCNC: 1.4 G/DL — LOW (ref 3.3–5)
ALP SERPL-CCNC: 79 U/L — SIGNIFICANT CHANGE UP (ref 40–120)
ALT FLD-CCNC: 16 U/L — SIGNIFICANT CHANGE UP (ref 12–78)
ANION GAP SERPL CALC-SCNC: 5 MMOL/L — SIGNIFICANT CHANGE UP (ref 5–17)
AST SERPL-CCNC: 34 U/L — SIGNIFICANT CHANGE UP (ref 15–37)
BILIRUB DIRECT SERPL-MCNC: 0.2 MG/DL — SIGNIFICANT CHANGE UP (ref 0–0.3)
BILIRUB INDIRECT FLD-MCNC: 0.3 MG/DL — SIGNIFICANT CHANGE UP (ref 0.2–1)
BILIRUB SERPL-MCNC: 0.5 MG/DL — SIGNIFICANT CHANGE UP (ref 0.2–1.2)
BUN SERPL-MCNC: 9 MG/DL — SIGNIFICANT CHANGE UP (ref 7–23)
CALCIUM SERPL-MCNC: 7.8 MG/DL — LOW (ref 8.5–10.1)
CHLORIDE SERPL-SCNC: 113 MMOL/L — HIGH (ref 96–108)
CO2 SERPL-SCNC: 23 MMOL/L — SIGNIFICANT CHANGE UP (ref 22–31)
CREAT SERPL-MCNC: 0.94 MG/DL — SIGNIFICANT CHANGE UP (ref 0.5–1.3)
GLUCOSE SERPL-MCNC: 78 MG/DL — SIGNIFICANT CHANGE UP (ref 70–99)
HCT VFR BLD CALC: 36 % — LOW (ref 39–50)
HGB BLD-MCNC: 11.3 G/DL — LOW (ref 13–17)
MAGNESIUM SERPL-MCNC: 2 MG/DL — SIGNIFICANT CHANGE UP (ref 1.6–2.6)
MCHC RBC-ENTMCNC: 27.3 PG — SIGNIFICANT CHANGE UP (ref 27–34)
MCHC RBC-ENTMCNC: 31.4 G/DL — LOW (ref 32–36)
MCV RBC AUTO: 87 FL — SIGNIFICANT CHANGE UP (ref 80–100)
NRBC # BLD: 0 /100 WBCS — SIGNIFICANT CHANGE UP (ref 0–0)
PHOSPHATE SERPL-MCNC: 1.5 MG/DL — LOW (ref 2.5–4.5)
PLATELET # BLD AUTO: 173 K/UL — SIGNIFICANT CHANGE UP (ref 150–400)
POTASSIUM SERPL-MCNC: 3.6 MMOL/L — SIGNIFICANT CHANGE UP (ref 3.5–5.3)
POTASSIUM SERPL-SCNC: 3.6 MMOL/L — SIGNIFICANT CHANGE UP (ref 3.5–5.3)
PROT SERPL-MCNC: 5.6 GM/DL — LOW (ref 6–8.3)
RBC # BLD: 4.14 M/UL — LOW (ref 4.2–5.8)
RBC # FLD: 15.7 % — HIGH (ref 10.3–14.5)
SODIUM SERPL-SCNC: 141 MMOL/L — SIGNIFICANT CHANGE UP (ref 135–145)
WBC # BLD: 7.21 K/UL — SIGNIFICANT CHANGE UP (ref 3.8–10.5)
WBC # FLD AUTO: 7.21 K/UL — SIGNIFICANT CHANGE UP (ref 3.8–10.5)

## 2022-02-18 PROCEDURE — 71045 X-RAY EXAM CHEST 1 VIEW: CPT | Mod: 26

## 2022-02-18 PROCEDURE — 99232 SBSQ HOSP IP/OBS MODERATE 35: CPT

## 2022-02-18 PROCEDURE — 99233 SBSQ HOSP IP/OBS HIGH 50: CPT

## 2022-02-18 PROCEDURE — 93306 TTE W/DOPPLER COMPLETE: CPT | Mod: 26

## 2022-02-18 RX ORDER — FUROSEMIDE 40 MG
40 TABLET ORAL ONCE
Refills: 0 | Status: COMPLETED | OUTPATIENT
Start: 2022-02-18 | End: 2022-02-18

## 2022-02-18 RX ORDER — POTASSIUM PHOSPHATE, MONOBASIC POTASSIUM PHOSPHATE, DIBASIC 236; 224 MG/ML; MG/ML
30 INJECTION, SOLUTION INTRAVENOUS ONCE
Refills: 0 | Status: COMPLETED | OUTPATIENT
Start: 2022-02-18 | End: 2022-02-18

## 2022-02-18 RX ADMIN — ENOXAPARIN SODIUM 50 MILLIGRAM(S): 100 INJECTION SUBCUTANEOUS at 11:30

## 2022-02-18 RX ADMIN — Medication 1 PACKET(S): at 13:14

## 2022-02-18 RX ADMIN — Medication 1 PACKET(S): at 05:55

## 2022-02-18 RX ADMIN — BICALUTAMIDE 50 MILLIGRAM(S): 50 TABLET, FILM COATED ORAL at 11:30

## 2022-02-18 RX ADMIN — Medication 100 MILLIGRAM(S): at 05:45

## 2022-02-18 RX ADMIN — Medication 40 MILLIGRAM(S): at 20:50

## 2022-02-18 RX ADMIN — POTASSIUM PHOSPHATE, MONOBASIC POTASSIUM PHOSPHATE, DIBASIC 83.33 MILLIMOLE(S): 236; 224 INJECTION, SOLUTION INTRAVENOUS at 17:19

## 2022-02-18 RX ADMIN — SODIUM CHLORIDE 60 MILLILITER(S): 9 INJECTION, SOLUTION INTRAVENOUS at 13:25

## 2022-02-18 RX ADMIN — Medication 100 MILLIGRAM(S): at 17:20

## 2022-02-18 RX ADMIN — Medication 40 MILLIGRAM(S): at 17:20

## 2022-02-18 RX ADMIN — PANTOPRAZOLE SODIUM 40 MILLIGRAM(S): 20 TABLET, DELAYED RELEASE ORAL at 06:07

## 2022-02-18 NOTE — PROGRESS NOTE ADULT - ASSESSMENT
Patient is a 89M with a PMH of CHF?, prostate Ca who presents to the ED for dyspnea.  Patient currently AAOx1, unable to provide history.  Patient reportedly noted to have decreased appetite and wheezing by his HHA today who called EMS.  No current complaints.  SpO2 currently 96 on 4L via NC.  Vitals stable, labs show mild leukocytosis.    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   Even if foot is not the source, he needs good wound care and podiatric treatment     2/16: no fever, on NRB, + bacteremia, repeat BCs are pending, TTE pending, abx changed to cefazolin according to the sensitivity  2/17: remains afebrile, on NRB, lactate is better 2.4, R foot xray with no findings, repeat BCs pending, cefazolin continued, TTE and CT a/p pending.   Attending addendum:  agree with above   continue cefazolin and follow cultures   Transthoracic Echocardiogram to rule out endocarditis   will eventually need midline    2/18: no fever, on NRB, no WBC, Cr and LFTs ok, repeat BCs with no growth, TTE and CT a/p pending    MSSA bacteremia   high serum lactate   CHF   functional quadraplegia     Plan:   continue Cefazolin IV 2 grams q 12 hrs  follow culture data   Transthoracic Echocardiogram   trend lactate   trend wbc   CT a/p to look for focus of infection   caution with fluids if in fact has heart failure   frequent turns, offloading, and nutrition optimization to avoid bedsores  will eventually need midline

## 2022-02-18 NOTE — PROGRESS NOTE ADULT - SUBJECTIVE AND OBJECTIVE BOX
SANDRA ROCHE  MRN-[id number]    Follow Up:  bacteremia    Interval History: the pt was seen and examined earlier, no distress, on NRB - was above pt's mouth, repositioned, in mittens. Pt is awake, able to state his name but nothing else. The pt is afebrile, no WBC, Cr and LFTs ok.     PAST MEDICAL & SURGICAL HISTORY:  HTN (hypertension)    CHF (congestive heart failure)    Prostate CA    History of tonsillectomy        ROS:    [x ] Unobtainable because: pt is somewhat confused  [ ] All other systems negative    Constitutional: no fever, no chills  Head: no trauma  Eyes: no vision changes, no eye pain  ENT:  no sore throat, no rhinorrhea  Cardiovascular:  no chest pain, no palpitation  Respiratory:  no SOB, no cough  GI:  no abd pain, no vomiting, no diarrhea  urinary: no dysuria, no hematuria, no flank pain  musculoskeletal:  no joint pain, no joint swelling  skin:  no rash  neurology:  no headache, no seizure, no change in mental status  psych: no anxiety, no depression         Allergies  No Known Allergies        ANTIMICROBIALS:  ceFAZolin   IVPB 2000 every 12 hours      OTHER MEDS:  atorvastatin 20 milliGRAM(s) Oral at bedtime  bicalutamide 50 milliGRAM(s) Oral daily  dextrose 5% + sodium chloride 0.45%. 1000 milliLiter(s) IV Continuous <Continuous>  enoxaparin Injectable 50 milliGRAM(s) SubCutaneous daily  pantoprazole    Tablet 40 milliGRAM(s) Oral before breakfast      Vital Signs Last 24 Hrs  T(C): 36.1 (18 Feb 2022 10:58), Max: 36.8 (17 Feb 2022 16:35)  T(F): 97 (18 Feb 2022 10:58), Max: 98.2 (17 Feb 2022 16:35)  HR: 82 (18 Feb 2022 10:58) (65 - 91)  BP: 123/74 (18 Feb 2022 10:58) (114/74 - 133/80)  BP(mean): --  RR: 18 (18 Feb 2022 10:58) (17 - 18)  SpO2: 99% (18 Feb 2022 10:58) (91% - 99%)    Physical Exam:  Constitutional: non-toxic, no distress was on NRB  HEAD/EYES: anicteric, no conjunctival injection  ENT:  supple, no thrush, dry mouth  Cardiovascular:   normal S1, S2, no murmur, no edema  Respiratory:  coarse breath sounds bilaterally, no wheezes, no rales  GI:  soft, non-tender, normal bowel sounds  Musculoskeletal:  no synovitis, normal ROM, right hallux dry gangrene   Neurologic: awake and alert, able to say his name, follows simple commands   Skin:  no rash, no erythema, no phlebitis  Heme/Onc: no cervical lymphadenopathy   Psychiatric:  calm behavior     WBC Count: 7.21 K/uL (02-18 @ 07:35)  WBC Count: 8.69 K/uL (02-17 @ 08:31)  WBC Count: 8.90 K/uL (02-16 @ 10:26)  WBC Count: 9.20 K/uL (02-15 @ 11:59)  WBC Count: 11.21 K/uL (02-14 @ 06:33)  WBC Count: 11.45 K/uL (02-13 @ 19:15)                            11.3   7.21  )-----------( 173      ( 18 Feb 2022 07:35 )             36.0       02-18    141  |  113<H>  |  9   ----------------------------<  78  3.6   |  23  |  0.94    Ca    7.8<L>      18 Feb 2022 07:35  Phos  1.5     02-18  Mg     2.0     02-18    TPro  5.6<L>  /  Alb  1.4<L>  /  TBili  0.5  /  DBili  0.2  /  AST  34  /  ALT  16  /  AlkPhos  79  02-18          Creatinine Trend: 0.94<--, 0.97<--, 0.99<--, 1.07<--, 1.18<--, 1.46<--  Lactate, Blood: 2.4 mmol/L (02-17-22 @ 08:32)  Lactate, Blood: 3.3 mmol/L (02-16-22 @ 20:29)  Lactate, Blood: 5.3 mmol/L (02-16-22 @ 16:20)      MICROBIOLOGY:  v  .Blood Blood-Peripheral  02-17-22   No growth to date.  --  --      .Blood Blood-Peripheral  02-16-22   No growth to date.  --  --      .Blood Blood  02-14-22   Growth in aerobic and anaerobic bottles: Staphylococcus aureus  ***Blood Panel PCR results on this specimen are available  approximately 3 hours after the Gram stain result.***  Gram stain, PCR, and/or culture results may not always  correspond dueto difference in methodologies.  ************************************************************  This PCR assay was performed by multiplex PCR. This  Assay tests for 66 bacterial and resistance gene targets.  Please refer to the [facility] test directory  at [email or web address] for details.  --  Blood Culture PCR  Staphylococcus aureus        D-Dimer Assay, Quantitative: 6004 (02-13)      SARS-CoV-2 Result: NotDetec (02-13-22 @ 19:15)      RADIOLOGY:

## 2022-02-18 NOTE — PROGRESS NOTE ADULT - ASSESSMENT
a 89M with a PMH of CHF?, prostate Ca who presents to the ED for dyspnea.  Patient currently AAOx1, unable to provide history.  Patient reportedly noted to have decreased appetite and wheezing by his HHA today who called EMS.  No current complaints.  SpO2 currently 96 on 4L via NC.  Vitals stable, labs show mild leukocytosis.  Will admit to tele.         Metabolic encephalopathy.    Hx of failure to thrive   ·  Plan: Unclear baseline mental status.  Patient presents with hypothermia,   As per son- was congested/ not eating much   Usually pt is bedbound on a wheelchair for most of the day-mainly due to general weakness for over more than 6months, has dementia  Hx of shin wound- and was told not to move around too much for the wound to heal     -Pt improving, speaking in full sentences, passed speech and swallow- on pureed diet      Bacteremia  -continue IV antibiotics-continue Zosyn  Lactic acid decreasing  -As per Podiatry- less likely source of infection being the right hallux, awaiting official xray reports  -CT of chest reviewed- PNA   -Ct of a/p pending, ECHO performed today- awaiting reading     Acute pulmonary embolus.    RLL Pneumonia.   Underlying COPD  switched heparin drip to lovenox (theraputic dose) since pt agitated and not easy stick  Will need to switch to a DOAC but will need to estimate risks/vs benefits given hx of gastric ulcers and hx of bleeding in past  -will try to wean off nonrebreather   continue Zosyn   -Pulm following pt     Troponin level elevated.   - Troponin elevated-most likely demand ischemia  No current chest pain.   As per Cardiology - "no concomitant evidence of acute ischemia clinically or on ECG - likely type II MI in setting of CAP/PE."       Chronic CHF.   - spironolactone (on hold)   Holding Lasix and aldactone now in setting of hypernatremia and dehydration   -reviewed echo-preserved EF  ASA relatively contraindicated due to recurrent GI bleed in the past  Bbl relatively contraindicated due to emphysema, smoking history  will restart on Statin          AMNA (acute kidney injury).   -improved with fluids      : Hypernatremia.   -resolved  Hypokalemia  -continue to supplement and monitor       Prostate cancer.   ·  Plan: bicalutamide.      Right hallux and heal wounds   -continue wound care  -follow up with Podiatry   -xray no acute findings     dvtppx- Lovenox therapeutic dose         SonMickey Jason- [phone]  [facility] signed--again- pt is DNR/DNI     a 89M with a PMH of CHF?, prostate Ca who presents to the ED for dyspnea.  Patient currently AAOx1, unable to provide history.  Patient reportedly noted to have decreased appetite and wheezing by his HHA today who called EMS.  No current complaints.  SpO2 currently 96 on 4L via NC.  Vitals stable, labs show mild leukocytosis.  Will admit to tele.         Metabolic encephalopathy.    Hx of failure to thrive   ·  Plan: Unclear baseline mental status.  Patient presents with hypothermia,   As per son- was congested/ not eating much   Usually pt is bedbound on a wheelchair for most of the day-mainly due to general weakness for over more than 6months, has dementia  Hx of shin wound- and was told not to move around too much for the wound to heal     -Pt improving, speaking in full sentences, passed speech and swallow- on pureed diet      Bacteremia  -continue IV antibiotics-continue Zosyn  Lactic acid decreasing  -As per Podiatry- less likely source of infection being the right hallux, awaiting official xray reports  -CT of chest reviewed- PNA   -Ct of a/p pending, ECHO performed today- awaiting reading     Acute pulmonary embolus.    RLL Pneumonia.   Underlying COPD  switched heparin drip to lovenox (theraputic dose) since pt agitated and not easy stick  Will need to switch to a DOAC but will need to estimate risks/vs benefits given hx of gastric ulcers and hx of bleeding in past  -will try to wean off nonrebreather   continue Zosyn   -Pulm following pt     Troponin level elevated.   - Troponin elevated-most likely demand ischemia  No current chest pain.   As per Cardiology - "no concomitant evidence of acute ischemia clinically or on ECG - likely type II MI in setting of CAP/PE."       Chronic CHF.   - spironolactone (on hold)   Holding Lasix and aldactone now in setting of hypernatremia and dehydration   -awaiting ECHO- was performed this morning  ASA relatively contraindicated due to recurrent GI bleed in the past  Bbl relatively contraindicated due to emphysema, smoking history  will restart on Statin   Pt very congested onPE-will give IV Lasix , d/c all fluids          AMNA (acute kidney injury).   -improved with fluids      : Hypernatremia.   -resolved  Hypokalemia  -continue to supplement and monitor       Prostate cancer.   ·  Plan: bicalutamide.      Right hallux and heal wounds   -continue wound care  -follow up with Podiatry   -xray no acute findings     dvtppx- Lovenox therapeutic dose         Stephane Jason- [phone]  [facility]- pt is DNR/DNI

## 2022-02-18 NOTE — PROGRESS NOTE ADULT - SUBJECTIVE AND OBJECTIVE BOX
Patient is a 89y old  Male who presents with a chief complaint of PE, PNA? (14 Feb 2022 08:40)      INTERVAL HPI/OVERNIGHT EVENTS: Pt improving even starting to joke around a bit today, laughing and smiling.   Still on nonrebreather-will try to wean off    MEDICATIONS  (STANDING):  ceFAZolin   IVPB 2000 milliGRAM(s) IV Intermittent every 12 hours  dextrose 5% + sodium chloride 0.45%. 1000 milliLiter(s) (60 mL/Hr) IV Continuous <Continuous>  enoxaparin Injectable 50 milliGRAM(s) SubCutaneous daily  pantoprazole    Tablet 40 milliGRAM(s) Oral before breakfast    MEDICATIONS  (PRN):            Allergies    No Known Allergies    Intolerances        REVIEW OF SYSTEMS:  unable to obtain fully due to mentation     ICU Vital Signs Last 24 Hrs  T(C): 36.1 (18 Feb 2022 10:58), Max: 36.8 (17 Feb 2022 16:35)  T(F): 97 (18 Feb 2022 10:58), Max: 98.2 (17 Feb 2022 16:35)  HR: 82 (18 Feb 2022 10:58) (65 - 91)  BP: 123/74 (18 Feb 2022 10:58) (114/74 - 133/80)  BP(mean): --  ABP: --  ABP(mean): --  RR: 18 (18 Feb 2022 10:58) (17 - 18)  SpO2: 99% (18 Feb 2022 10:58) (91% - 99%)        PHYSICAL EXAM:  GENERAL: NAD, well-groomed, well-developed, very thin, speaking in full sentences, still on nonrebreather  HEAD:  Atraumatic, Normocephalic  EYES: EOMI, PERRLA, conjunctiva and sclera clear  ENMT: No tonsillar erythema, exudates, or enlargement; Moist mucous membranes, Good dentition, No lesions  NECK: Supple, No JVD,  NERVOUS SYSTEM:  Alert & Oriented X1,   CHEST/LUNG: Clear to percussion bilaterally; No rales, rhonchi, wheezing, or rubs  HEART: Regular rate and rhythm; No murmurs, rubs, or gallops  ABDOMEN: Thin,, Soft, Nontender, Nondistended; Bowel sounds present  EXTREMITIES:  2+ Peripheral Pulses, No clubbing, cyanosis, or edema, right hallux and heal wtih dry eschars   SKIN: No rashes or lesions    LABS:                                                                      11.3   7.21  )-----------( 173      ( 18 Feb 2022 07:35 )             36.0     02-18    141  |  113<H>  |  9   ----------------------------<  78  3.6   |  23  |  0.94    Ca    7.8<L>      18 Feb 2022 07:35  Phos  1.5     02-18  Mg     2.0     02-18    TPro  5.6<L>  /  Alb  1.4<L>  /  TBili  0.5  /  DBili  0.2  /  AST  34  /  ALT  16  /  AlkPhos  79  02-18                       Patient is a 89y old  Male who presents with a chief complaint of PE, PNA? (14 Feb 2022 08:40)      INTERVAL HPI/OVERNIGHT EVENTS: Pt improving even starting to joke around a bit today, laughing and smiling.   Still on nonrebreather-will try to wean off      MEDICATIONS  (STANDING):  ceFAZolin   IVPB 2000 milliGRAM(s) IV Intermittent every 12 hours  dextrose 5% + sodium chloride 0.45%. 1000 milliLiter(s) (60 mL/Hr) IV Continuous <Continuous>  enoxaparin Injectable 50 milliGRAM(s) SubCutaneous daily  pantoprazole    Tablet 40 milliGRAM(s) Oral before breakfast    MEDICATIONS  (PRN):            Allergies    No Known Allergies    Intolerances        REVIEW OF SYSTEMS:  unable to obtain fully due to mentation     ICU Vital Signs Last 24 Hrs  T(C): 36.1 (18 Feb 2022 10:58), Max: 36.8 (17 Feb 2022 16:35)  T(F): 97 (18 Feb 2022 10:58), Max: 98.2 (17 Feb 2022 16:35)  HR: 82 (18 Feb 2022 10:58) (65 - 91)  BP: 123/74 (18 Feb 2022 10:58) (114/74 - 133/80)  BP(mean): --  ABP: --  ABP(mean): --  RR: 18 (18 Feb 2022 10:58) (17 - 18)  SpO2: 99% (18 Feb 2022 10:58) (91% - 99%)        PHYSICAL EXAM:  GENERAL: NAD, well-groomed, well-developed, very thin, speaking in full sentences, still on nonrebreather  HEAD:  Atraumatic, Normocephalic  EYES: EOMI, PERRLA, conjunctiva and sclera clear  ENMT: No tonsillar erythema, exudates, or enlargement; Moist mucous membranes, Good dentition, No lesions  NECK: Supple, No JVD,  NERVOUS SYSTEM:  Alert & Oriented X1,   CHEST/LUNG: + rales b/l, no wheezing, or rubs  HEART: Regular rate and rhythm; No murmurs, rubs, or gallops  ABDOMEN: Thin,, Soft, Nontender, Nondistended; Bowel sounds present  EXTREMITIES:  2+ Peripheral Pulses, No clubbing, cyanosis, or edema, right hallux and heal wtih dry eschars   SKIN: No rashes or lesions    LABS:                                                                      11.3   7.21  )-----------( 173      ( 18 Feb 2022 07:35 )             36.0     02-18    141  |  113<H>  |  9   ----------------------------<  78  3.6   |  23  |  0.94    Ca    7.8<L>      18 Feb 2022 07:35  Phos  1.5     02-18  Mg     2.0     02-18    TPro  5.6<L>  /  Alb  1.4<L>  /  TBili  0.5  /  DBili  0.2  /  AST  34  /  ALT  16  /  AlkPhos  79  02-18

## 2022-02-19 LAB
ALBUMIN SERPL ELPH-MCNC: 1.5 G/DL — LOW (ref 3.3–5)
ALBUMIN SERPL ELPH-MCNC: 1.5 G/DL — LOW (ref 3.3–5)
ALP SERPL-CCNC: 84 U/L — SIGNIFICANT CHANGE UP (ref 40–120)
ALP SERPL-CCNC: 89 U/L — SIGNIFICANT CHANGE UP (ref 40–120)
ALT FLD-CCNC: 16 U/L — SIGNIFICANT CHANGE UP (ref 12–78)
ALT FLD-CCNC: 21 U/L — SIGNIFICANT CHANGE UP (ref 12–78)
ANION GAP SERPL CALC-SCNC: 11 MMOL/L — SIGNIFICANT CHANGE UP (ref 5–17)
ANION GAP SERPL CALC-SCNC: 12 MMOL/L — SIGNIFICANT CHANGE UP (ref 5–17)
AST SERPL-CCNC: 68 U/L — HIGH (ref 15–37)
AST SERPL-CCNC: 92 U/L — HIGH (ref 15–37)
BILIRUB SERPL-MCNC: 0.6 MG/DL — SIGNIFICANT CHANGE UP (ref 0.2–1.2)
BILIRUB SERPL-MCNC: 0.6 MG/DL — SIGNIFICANT CHANGE UP (ref 0.2–1.2)
BUN SERPL-MCNC: 13 MG/DL — SIGNIFICANT CHANGE UP (ref 7–23)
BUN SERPL-MCNC: 14 MG/DL — SIGNIFICANT CHANGE UP (ref 7–23)
CALCIUM SERPL-MCNC: 8.2 MG/DL — LOW (ref 8.5–10.1)
CALCIUM SERPL-MCNC: 8.9 MG/DL — SIGNIFICANT CHANGE UP (ref 8.5–10.1)
CHLORIDE SERPL-SCNC: 113 MMOL/L — HIGH (ref 96–108)
CHLORIDE SERPL-SCNC: 113 MMOL/L — HIGH (ref 96–108)
CO2 SERPL-SCNC: 18 MMOL/L — LOW (ref 22–31)
CO2 SERPL-SCNC: 18 MMOL/L — LOW (ref 22–31)
CREAT SERPL-MCNC: 1.49 MG/DL — HIGH (ref 0.5–1.3)
CREAT SERPL-MCNC: 1.53 MG/DL — HIGH (ref 0.5–1.3)
CULTURE RESULTS: SIGNIFICANT CHANGE UP
GLUCOSE BLDC GLUCOMTR-MCNC: 71 MG/DL — SIGNIFICANT CHANGE UP (ref 70–99)
GLUCOSE SERPL-MCNC: 56 MG/DL — LOW (ref 70–99)
GLUCOSE SERPL-MCNC: 56 MG/DL — LOW (ref 70–99)
HCT VFR BLD CALC: 40.8 % — SIGNIFICANT CHANGE UP (ref 39–50)
HGB BLD-MCNC: 12.7 G/DL — LOW (ref 13–17)
LACTATE SERPL-SCNC: 4.6 MMOL/L — CRITICAL HIGH (ref 0.7–2)
LACTATE SERPL-SCNC: 4.8 MMOL/L — CRITICAL HIGH (ref 0.7–2)
MAGNESIUM SERPL-MCNC: 2 MG/DL — SIGNIFICANT CHANGE UP (ref 1.6–2.6)
MAGNESIUM SERPL-MCNC: 2.6 MG/DL — SIGNIFICANT CHANGE UP (ref 1.6–2.6)
MCHC RBC-ENTMCNC: 27.4 PG — SIGNIFICANT CHANGE UP (ref 27–34)
MCHC RBC-ENTMCNC: 31.1 G/DL — LOW (ref 32–36)
MCV RBC AUTO: 88.1 FL — SIGNIFICANT CHANGE UP (ref 80–100)
NRBC # BLD: 0 /100 WBCS — SIGNIFICANT CHANGE UP (ref 0–0)
PHOSPHATE SERPL-MCNC: 4.8 MG/DL — HIGH (ref 2.5–4.5)
PHOSPHATE SERPL-MCNC: 5.1 MG/DL — HIGH (ref 2.5–4.5)
PLATELET # BLD AUTO: 245 K/UL — SIGNIFICANT CHANGE UP (ref 150–400)
POTASSIUM SERPL-MCNC: 4.3 MMOL/L — SIGNIFICANT CHANGE UP (ref 3.5–5.3)
POTASSIUM SERPL-MCNC: 4.9 MMOL/L — SIGNIFICANT CHANGE UP (ref 3.5–5.3)
POTASSIUM SERPL-SCNC: 4.3 MMOL/L — SIGNIFICANT CHANGE UP (ref 3.5–5.3)
POTASSIUM SERPL-SCNC: 4.9 MMOL/L — SIGNIFICANT CHANGE UP (ref 3.5–5.3)
PROT SERPL-MCNC: 6 GM/DL — SIGNIFICANT CHANGE UP (ref 6–8.3)
PROT SERPL-MCNC: 6.3 GM/DL — SIGNIFICANT CHANGE UP (ref 6–8.3)
RBC # BLD: 4.63 M/UL — SIGNIFICANT CHANGE UP (ref 4.2–5.8)
RBC # FLD: 16 % — HIGH (ref 10.3–14.5)
SODIUM SERPL-SCNC: 142 MMOL/L — SIGNIFICANT CHANGE UP (ref 135–145)
SODIUM SERPL-SCNC: 143 MMOL/L — SIGNIFICANT CHANGE UP (ref 135–145)
SPECIMEN SOURCE: SIGNIFICANT CHANGE UP
WBC # BLD: 14.92 K/UL — HIGH (ref 3.8–10.5)
WBC # FLD AUTO: 14.92 K/UL — HIGH (ref 3.8–10.5)

## 2022-02-19 PROCEDURE — 71250 CT THORAX DX C-: CPT | Mod: 26

## 2022-02-19 PROCEDURE — 99233 SBSQ HOSP IP/OBS HIGH 50: CPT

## 2022-02-19 PROCEDURE — 74176 CT ABD & PELVIS W/O CONTRAST: CPT | Mod: 26

## 2022-02-19 RX ORDER — VANCOMYCIN HCL 1 G
500 VIAL (EA) INTRAVENOUS EVERY 24 HOURS
Refills: 0 | Status: DISCONTINUED | OUTPATIENT
Start: 2022-02-20 | End: 2022-02-21

## 2022-02-19 RX ORDER — CEFEPIME 1 G/1
2000 INJECTION, POWDER, FOR SOLUTION INTRAMUSCULAR; INTRAVENOUS ONCE
Refills: 0 | Status: COMPLETED | OUTPATIENT
Start: 2022-02-19 | End: 2022-02-19

## 2022-02-19 RX ORDER — CEFEPIME 1 G/1
INJECTION, POWDER, FOR SOLUTION INTRAMUSCULAR; INTRAVENOUS
Refills: 0 | Status: DISCONTINUED | OUTPATIENT
Start: 2022-02-19 | End: 2022-02-28

## 2022-02-19 RX ORDER — CEFEPIME 1 G/1
2000 INJECTION, POWDER, FOR SOLUTION INTRAMUSCULAR; INTRAVENOUS EVERY 24 HOURS
Refills: 0 | Status: DISCONTINUED | OUTPATIENT
Start: 2022-02-20 | End: 2022-02-28

## 2022-02-19 RX ORDER — VANCOMYCIN HCL 1 G
VIAL (EA) INTRAVENOUS
Refills: 0 | Status: DISCONTINUED | OUTPATIENT
Start: 2022-02-19 | End: 2022-02-21

## 2022-02-19 RX ORDER — SODIUM CHLORIDE 9 MG/ML
1000 INJECTION, SOLUTION INTRAVENOUS ONCE
Refills: 0 | Status: COMPLETED | OUTPATIENT
Start: 2022-02-19 | End: 2022-02-19

## 2022-02-19 RX ORDER — ALBUMIN HUMAN 25 %
100 VIAL (ML) INTRAVENOUS EVERY 6 HOURS
Refills: 0 | Status: COMPLETED | OUTPATIENT
Start: 2022-02-19 | End: 2022-02-20

## 2022-02-19 RX ORDER — VANCOMYCIN HCL 1 G
500 VIAL (EA) INTRAVENOUS ONCE
Refills: 0 | Status: COMPLETED | OUTPATIENT
Start: 2022-02-19 | End: 2022-02-19

## 2022-02-19 RX ORDER — SCOPALAMINE 1 MG/3D
1 PATCH, EXTENDED RELEASE TRANSDERMAL
Refills: 0 | Status: DISCONTINUED | OUTPATIENT
Start: 2022-02-19 | End: 2022-02-28

## 2022-02-19 RX ADMIN — Medication 50 MILLILITER(S): at 17:41

## 2022-02-19 RX ADMIN — SCOPALAMINE 1 PATCH: 1 PATCH, EXTENDED RELEASE TRANSDERMAL at 01:35

## 2022-02-19 RX ADMIN — SCOPALAMINE 1 PATCH: 1 PATCH, EXTENDED RELEASE TRANSDERMAL at 07:32

## 2022-02-19 RX ADMIN — ATORVASTATIN CALCIUM 20 MILLIGRAM(S): 80 TABLET, FILM COATED ORAL at 21:13

## 2022-02-19 RX ADMIN — Medication 50 MILLILITER(S): at 23:41

## 2022-02-19 RX ADMIN — Medication 100 MILLIGRAM(S): at 05:16

## 2022-02-19 RX ADMIN — SCOPALAMINE 1 PATCH: 1 PATCH, EXTENDED RELEASE TRANSDERMAL at 19:31

## 2022-02-19 RX ADMIN — BICALUTAMIDE 50 MILLIGRAM(S): 50 TABLET, FILM COATED ORAL at 11:15

## 2022-02-19 RX ADMIN — Medication 100 MILLIGRAM(S): at 10:36

## 2022-02-19 RX ADMIN — ENOXAPARIN SODIUM 50 MILLIGRAM(S): 100 INJECTION SUBCUTANEOUS at 11:16

## 2022-02-19 RX ADMIN — CEFEPIME 100 MILLIGRAM(S): 1 INJECTION, POWDER, FOR SOLUTION INTRAMUSCULAR; INTRAVENOUS at 10:36

## 2022-02-19 RX ADMIN — Medication 50 MILLILITER(S): at 11:37

## 2022-02-19 RX ADMIN — SODIUM CHLORIDE 1000 MILLILITER(S): 9 INJECTION, SOLUTION INTRAVENOUS at 08:44

## 2022-02-19 NOTE — PROGRESS NOTE ADULT - ASSESSMENT
a 89M with a PMH of CHF?, prostate Ca who presents to the ED for dyspnea.  Patient currently AAOx1, unable to provide history.  Patient reportedly noted to have decreased appetite and wheezing by his HHA today who called EMS.  No current complaints.  SpO2 currently 96 on 4L via NC.  Vitals stable, labs show mild leukocytosis.  Will admit to tele.         Metabolic encephalopathy.    Hx of failure to thrive   ·  Plan: Unclear baseline mental status.  Patient presents with hypothermia,   As per son- was congested/ not eating much   Usually pt is bedbound on a wheelchair for most of the day-mainly due to general weakness for over more than 6months, has dementia  Hx of shin wound- and was told not to move around too much for the wound to heal     -Pt improving, speaking in full sentences, passed speech and swallow- on pureed diet      Bacteremia  one blood culture from 2/14 growing staph aureus  Repeat blood cultures negative  CXR performed on 2/18- showing Complete opacification of RIGHT hemithorax.   -will perform CT of chest/abd/pelvis   -antibiotics changed to Vanc/Cefepime- due to severe PNA     -As per Podiatry- less likely source of infection being the right hallux, awaiting official xray reports  -ECHO- no signs of endocarditis, EF-60-65%, mitral annual calcification     Acute pulmonary embolus.    RLL Pneumonia.   Mucous plug  Underlying COPD  CTA-Pulmonary embolism in the segmental right lower lobe  switched heparin drip to lovenox (theraputic dose) since pt agitated and not easy stick  Will need to switch to a DOAC but will need to estimate risks/vs benefits given hx of gastric ulcers and hx of bleeding in past  -continue antibiotics  -continue respiratory suctioning  -Pulm following pt     Troponin level elevated.   - Troponin elevated-most likely demand ischemia  No current chest pain.   As per Cardiology - "no concomitant evidence of acute ischemia clinically or on ECG - likely type II MI in setting of CAP/PE."       Chronic CHF.   - spironolactone (on hold)   Holding Lasix and aldactone now in setting of hypernatremia and dehydration   -Received two doses of IV 40mg Lasix due to congestion on 2/18  ASA relatively contraindicated due to recurrent GI bleed in the past  Bbl relatively contraindicated due to emphysema, smoking history  will restart on Statin   ECHO reviewed- EF preserved          AMNA (acute kidney injury).   -monitor      : Hypernatremia.   -resolved  Hypokalemia  -continue to supplement and monitor       Prostate cancer.   ·  Plan: bicalutamide.      Right hallux and heal wounds   -continue wound care  -follow up with Podiatry   -xray no acute findings     dvtppx- Lovenox therapeutic dose         Stephane Jason- [phone]  [facility] signed--again- pt is DNR/DNI

## 2022-02-19 NOTE — PROVIDER CONTACT NOTE (CRITICAL VALUE NOTIFICATION) - NAME OF MD/NP/PA/DO NOTIFIED:
Dr Gwen MAYO
Donte Potts
Thao DIOP
Yesika GATES)
Leisa (PA)
PA Ketty
PA Ketty
CALEB Krishnan
CALEB snyder
CALEB Mtz

## 2022-02-19 NOTE — PROVIDER CONTACT NOTE (CRITICAL VALUE NOTIFICATION) - TEST AND RESULT REPORTED:
glucose 54
lactate 4.8
lactate 6.1
Lactate 4.8  Troponin  185.7  D-Dimer 6004
blood culture from february 13 growth in arobic bottle stapholococci arueus.   growth in aneorbic bottle gram positive cocci in clusters
lactate 4.6
lactate 4.6
Lactate 5.3
Lactate 5.6
K- 2.9

## 2022-02-19 NOTE — PROGRESS NOTE ADULT - SUBJECTIVE AND OBJECTIVE BOX
SANDRA ROCHE  MRN-[id number]    Follow Up:  bacteremia    Interval History: the pt was seen and examined earlier, was transitioned to nasal cannula satting 98% on 10L, he had CXR earlier which showed near opacification of right chest   PAST MEDICAL & SURGICAL HISTORY:  HTN (hypertension)    CHF (congestive heart failure)    Prostate CA    History of tonsillectomy        ROS:    [x ] Unobtainable because: pt is somewhat confused  [ ] All other systems negative      Allergies  No Known Allergies        ANTIMICROBIALS:    cefepime   IVPB    cefepime   IVPB 2000 every 24 hours  vancomycin  IVPB    vancomycin  IVPB 500 every 24 hours      MEDICATIONS  (STANDING):  atorvastatin 20 at bedtime  bicalutamide 50 daily  enoxaparin Injectable 50 daily  pantoprazole    Tablet 40 before breakfast  scopolamine 1 mG/72 Hr(s) Patch 1 every 72 hours      PRN      Physical Exam:  Vital Signs Last 24 Hrs  T(F): 97.2 (02-19-22 @ 23:38), Max: 98 (02-19-22 @ 18:12)  HR: 54 (02-19-22 @ 23:38)  BP: 97/70 (02-19-22 @ 23:38)  RR: 19 (02-19-22 @ 23:38)  SpO2: 100% (02-19-22 @ 23:38) (94% - 100%)  Wt(kg): --    Physical Exam:  Constitutional: chronically ill appearing  no distress was on nasal cannula   HEAD/EYES: anicteric, no conjunctival injection  ENT:  supple,, dry mouth  Cardiovascular:   normal S1, S2, no murmur, no edema  Respiratory:  coarse breath sounds bilaterally with diminished breath sounds on the right , no wheezes, no rales  GI:  soft, non-tender, normal bowel sounds  Musculoskeletal:  no synovitis,  right hallux dry gangrene   Neurologic: awake and alert, able to say his name, follows simple commands   Skin:  no rash, no erythema, no phlebitis  Heme/Onc: no cervical lymphadenopathy   Psychiatric:  calm behavior     WBC Count: 14.92 K/uL (02-19 @ 07:16)  WBC Count: 7.21 K/uL (02-18 @ 07:35)  WBC Count: 8.69 K/uL (02-17 @ 08:31)  WBC Count: 8.90 K/uL (02-16 @ 10:26)  WBC Count: 9.20 K/uL (02-15 @ 11:59)  WBC Count: 11.21 K/uL (02-14 @ 06:33)  WBC Count: 11.45 K/uL (02-13 @ 19:15)                            12.7   14.92 )-----------( 245      ( 19 Feb 2022 07:16 )             40.8       02-19    142  |  113<H>  |  14  ----------------------------<  56<L>  4.3   |  18<L>  |  1.53<H>    Ca    8.2<L>      19 Feb 2022 10:39  Phos  4.8     02-19  Mg     2.0     02-19    TPro  6.0  /  Alb  1.5<L>  /  TBili  0.6  /  DBili  x   /  AST  92<H>  /  ALT  21  /  AlkPhos  84  02-19      Creatinine Trend: 1.53<--, 1.49<--, 0.94<--, 0.97<--, 0.99<--, 1.07<--    Lactate, Blood: 4.6 mmol/L (02-19-22 @ 10:39)  Lactate, Blood: 4.8 mmol/L (02-19-22 @ 07:16)    MICROBIOLOGY:  v  .Blood Blood-Peripheral  02-17-22   No growth to date.  --  --      .Blood Blood-Peripheral  02-16-22   No growth to date.  --  --      .Blood Blood  02-14-22   Growth in aerobic and anaerobic bottles: Staphylococcus aureus  ***Blood Panel PCR results on this specimen are available  approximately 3 hours after the Gram stain result.***  Gram stain, PCR, and/or culture results may not always  correspond dueto difference in methodologies.  ************************************************************  This PCR assay was performed by multiplex PCR. This  Assay tests for 66 bacterial and resistance gene targets.  Please refer to the [facility] test directory  at [email or web address] for details.  --  Blood Culture PCR  Staphylococcus aureus        D-Dimer Assay, Quantitative: 6004 (02-13)      SARS-CoV-2 Result: NotDetec (02-13-22 @ 19:15)      RADIOLOGY:  < from: CT Chest No Cont (02.19.22 @ 10:21) >  IMPRESSION:  Increasing right pleural effusion with new complete right middle and   lower lobe atelectasis and increasing opacification of the right right   upper lobe.  Cannot exclude underlying pneumonia.    New left upper lobe pneumonia.    Right upper and middle lobe bronchi not visualize, either occluded,   compressed or postoperative.    Anasarca.    Nonobstructing right renal calculi.    < end of copied text >    < from: TTE Echo Complete w/o Contrast w/ Doppler (02.18.22 @ 10:30) >  Summary:   1. Left ventricular ejection fraction, by visual estimation, is 60 to   65%.   2. Technically difficult study.   3. Normal global left ventricular systolic function.   4. Normal left ventricular internal cavity size.   5. Spectral Doppler shows impaired relaxation pattern of left   ventricular myocardial filling (Grade I diastolic dysfunction).   6. Normal right ventricular size and function.   7. Normal left atrial size.   8. Normal right atrial size.   9. There is no evidence of pericardial effusion.  10. Mild mitral valve regurgitation.  11. Moderate mitral annular calcification.  12. Moderate thickening and calcification of the anterior and posterior   mitral valve leaflets.  13. Mild aortic regurgitation.  14. Mild aortic valve stenosis.  15. Estimated pulmonary artery systolic pressure is 49.2 mmHg assuming a   right atrial pressure of 5 mmHg,which is consistent with mild pulmonary   hypertension.  16. C/o prior echo of 2-12-18, mild AS and grade 1 diastolic dysfunction   now present.  17. Peak transaortic gradient equals 9.4 mmHg, mean transaortic gradient   equals 3.9 mmHg, the calculated aortic valve area equals 1.68 cm² by the   continuity equation consistent with mild aortic stenosis.  18. There is mild aortic root calcification.    < end of copied text >

## 2022-02-19 NOTE — PROGRESS NOTE ADULT - ASSESSMENT
Patient is a 89M with a PMH of CHF?, prostate Ca who presents to the ED for dyspnea.  Patient currently AAOx1, unable to provide history.  Patient reportedly noted to have decreased appetite and wheezing by his HHA today who called EMS.  No current complaints.  SpO2 currently 96 on 4L via NC.  Vitals stable, labs show mild leukocytosis.    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   Even if foot is not the source, he needs good wound care and podiatric treatment     2/16: no fever, on NRB, + bacteremia, repeat BCs are pending, TTE pending, abx changed to cefazolin according to the sensitivity  2/17: remains afebrile, on NRB, lactate is better 2.4, R foot xray with no findings, repeat BCs pending, cefazolin continued, TTE and CT a/p pending.   Attending addendum:  agree with above   continue cefazolin and follow cultures   Transthoracic Echocardiogram to rule out endocarditis   will eventually need midline    2/18: no fever, on NRB, no WBC, Cr and LFTs ok, repeat BCs with no growth, TTE and CT a/p pending  2/19: rising lactate again, IVF, new pneumonia and right sided opacification, has been changed to nasal cannula and tolerating so far, requested that cefazolin be changed to vancomycin and cefepime pending further workup and improvement     MSSA bacteremia   high serum lactate   right sided effusion with atelectasis  CHF   functional quadriplegia     Plan:   stop cefazolin  start cefepime and vancomycin   -send vancomycin trough before the  4th dose (therapeutic drug monitoring required with IV vancomycin)   follow culture data   Transthoracic Echocardiogram   trend lactate   trend wbc   caution with fluids if in fact has heart failure   IR or thoracic surgery consult for thoracentesis and possible chest tube placement   frequent turns, offloading, and nutrition optimization to avoid bedsores  will eventually need midline   mild AS     D/W Dr. Gwen Rey, DO  Infectious Disease Attending  Reachable via [employer] Teams or ID office: [phone]  After 5pm/weekends please call [phone] for all inquiries and new consults

## 2022-02-19 NOTE — PROGRESS NOTE ADULT - SUBJECTIVE AND OBJECTIVE BOX
INTERVAL HPI:  89M with HTN, CHF?, Prostate Ca who presents to the ED for dyspnea, wheezing,  wet cough, and decreased appetite for two weeks.  Found to have PE, Pneumonia, Dehydration, hypernatremia and Renal insuffiencey. Blood cultures are growing staph Aureus.  Not able to provide more details.  Started on IV heparin, IV fluids, NRB mask and antibiotics.     OVERNIGHT EVENTS:  Awake, responsive.    Vital Signs Last 24 Hrs  T(C): 36.4 (19 Feb 2022 11:05), Max: 36.6 (18 Feb 2022 23:53)  T(F): 97.5 (19 Feb 2022 11:05), Max: 97.9 (18 Feb 2022 23:53)  HR: 62 (19 Feb 2022 11:05) (62 - 118)  BP: 91/66 (19 Feb 2022 11:05) (91/66 - 155/86)  BP(mean): --  RR: 20 (19 Feb 2022 11:05) (18 - 22)  SpO2: 100% (19 Feb 2022 11:05) (94% - 100%)    PHYSICAL EXAM:  GEN:         Awake, responsive and comfortable.  HEENT:    Normal.    RESP:      Decreased air entry  CVS:         Regular rate and rhythm.     MEDICATIONS  (STANDING):  albumin human 25% IVPB 100 milliLiter(s) IV Intermittent every 6 hours  atorvastatin 20 milliGRAM(s) Oral at bedtime  bicalutamide 50 milliGRAM(s) Oral daily  cefepime   IVPB      enoxaparin Injectable 50 milliGRAM(s) SubCutaneous daily  pantoprazole    Tablet 40 milliGRAM(s) Oral before breakfast  scopolamine 1 mG/72 Hr(s) Patch 1 Patch Transdermal every 72 hours  vancomycin  IVPB        LABS:                        12.7   14.92 )-----------( 245      ( 19 Feb 2022 07:16 )             40.8     02-19    142  |  113<H>  |  14  ----------------------------<  56<L>  4.3   |  18<L>  |  1.53<H>    Ca    8.2<L>      19 Feb 2022 10:39  Phos  4.8     02-19  Mg     2.0     02-19    TPro  6.0  /  Alb  1.5<L>  /  TBili  0.6  /  DBili  x   /  AST  92<H>  /  ALT  21  /  AlkPhos  84  02-19    02-14 @ 17:42  pH: 7.40  pCO2: 39  pO2: 121  SaO2: 99.4  02-13 @ 19:38  pH: 7.46  pCO2: 35  pO2: 85  SaO2: 97.6  < from: CT Chest No Cont (02.19.22 @ 10:21) >  ACC: [id number] EXAM:  CT ABDOMEN AND PELVIS                        ACC: [id number] EXAM:  CT CHEST                          PROCEDURE DATE:  02/19/2022      INTERPRETATION:  CLINICAL INFORMATION: 89 years  Male with sob.    COMPARISON: PE study 2/13/2022 and CT abdomen and pelvis 10/24/2009    CONTRAST/COMPLICATIONS:  IV Contrast: None  Oral Contrast: None  Complications: None    PROCEDURE:  CT of the Chest, Abdomen and Pelvis was performed.  Sagittal and coronal reformats were performed.    LIMITATIONS: Evaluation of the solid organs, vascular structures and GI   tract is limited without oral and IV contrast. Motion artifact.    FINDINGS:  CHEST:  LUNGS AND LARGE AIRWAYS: Right upper lobe and middle lobe bronchi not   visualized. Worsening consolidation and atelectasis of the right lung now   with complete right middle and lower lobe atelectasis and minimal   aeration of the right upper lobe. Mild left lower lobe compressive   atelectasis unchanged.  Left upper lobe consolidation secondary to pneumonia.  PLEURA: Increasing right pleural effusion. Small left pleural effusion   unchanged.  VESSELS: Coronary atherosclerosis.  HEART: Heart size is normal. No pericardial effusion.  MEDIASTINUM AND HAIR: Within normal limits. Limited evaluation without IV   contrast.  CHEST WALL AND LOWER NECK: Within normal limits.    ABDOMEN AND PELVIS:  LIVER: Within normal limits.  BILE DUCTS: Normal caliber.  GALLBLADDER: Within normal limits.  SPLEEN: Within normal limits.  PANCREAS: Within normal limits.  ADRENALS: Within normal limits.  KIDNEYS/URETERS: Nonobstructing right intrarenal calculi measuring up to   7 mm. Right upper pole hypodensity too small to characterize. No   hydroureteronephrosis bilaterally.    BLADDER: Layering stonesversus milk of calcium in the bladder.  REPRODUCTIVE ORGANS: Atrophic prostate versus prostatectomy.    BOWEL: No bowel obstruction. Appendix is not visualized and cannot be   assessed.  PERITONEUM: No ascites.  VESSELS: Markedly tortuous atherosclerotic aorta.  RETROPERITONEUM/LYMPH NODES: No lymphadenopathy.  ABDOMINAL WALL: Anasarca.  BONES: Severe scoliosis.    IMPRESSION:  Increasing right pleural effusion with new complete right middle and   lower lobe atelectasis and increasing opacification of the right right   upper lobe.  Cannot exclude underlying pneumonia.    New left upper lobe pneumonia.    Right upper and middle lobe bronchi not visualize, either occluded,   compressed or postoperative.    Anasarca.    Nonobstructing right renal calculi.    JARED ADAMS MD; Attending Radiologist  This document has been electronically signed. Feb 19 2022 10:42AM    ASSESSMENT AND PLAN:  ·	Acute PE.  ·	RLL pneumonia.  ·	Staph Aureus bacteremia.  ·	Dehydration.  ·	Hypernatremia.  ·	Renal Insuffiencey  ·	Prostate CA.  ·	HTN.  ·	RUL+ RML atelectasis.  ·	Right pleural effusion.    SPO2 100% on Venti mask.  Will use chest vest for airway clearance.  Nasotracheal suctioning.  Continue antibiotics per ID.

## 2022-02-19 NOTE — PROVIDER CONTACT NOTE (CRITICAL VALUE NOTIFICATION) - PERSON GIVING RESULT:
Caroline Hanna
Nadia
Dary
Bridget
Calixto
Patricia
Teri villela
leon
Lab/Mrs. Handy
moncho beltran/ Dary

## 2022-02-20 LAB
ALBUMIN SERPL ELPH-MCNC: 2 G/DL — LOW (ref 3.3–5)
ALP SERPL-CCNC: 74 U/L — SIGNIFICANT CHANGE UP (ref 40–120)
ALT FLD-CCNC: 14 U/L — SIGNIFICANT CHANGE UP (ref 12–78)
ANION GAP SERPL CALC-SCNC: 11 MMOL/L — SIGNIFICANT CHANGE UP (ref 5–17)
AST SERPL-CCNC: 94 U/L — HIGH (ref 15–37)
BILIRUB SERPL-MCNC: 0.6 MG/DL — SIGNIFICANT CHANGE UP (ref 0.2–1.2)
BUN SERPL-MCNC: 21 MG/DL — SIGNIFICANT CHANGE UP (ref 7–23)
CALCIUM SERPL-MCNC: 8.5 MG/DL — SIGNIFICANT CHANGE UP (ref 8.5–10.1)
CHLORIDE SERPL-SCNC: 115 MMOL/L — HIGH (ref 96–108)
CO2 SERPL-SCNC: 21 MMOL/L — LOW (ref 22–31)
CREAT SERPL-MCNC: 1.4 MG/DL — HIGH (ref 0.5–1.3)
FLUAV AG NPH QL: SIGNIFICANT CHANGE UP
FLUBV AG NPH QL: SIGNIFICANT CHANGE UP
GLUCOSE SERPL-MCNC: 56 MG/DL — LOW (ref 70–99)
HCT VFR BLD CALC: 38.8 % — LOW (ref 39–50)
HGB BLD-MCNC: 12.2 G/DL — LOW (ref 13–17)
LACTATE SERPL-SCNC: 2.5 MMOL/L — HIGH (ref 0.7–2)
MCHC RBC-ENTMCNC: 27.4 PG — SIGNIFICANT CHANGE UP (ref 27–34)
MCHC RBC-ENTMCNC: 31.4 G/DL — LOW (ref 32–36)
MCV RBC AUTO: 87 FL — SIGNIFICANT CHANGE UP (ref 80–100)
MRSA PCR RESULT.: SIGNIFICANT CHANGE UP
NRBC # BLD: 0 /100 WBCS — SIGNIFICANT CHANGE UP (ref 0–0)
PLATELET # BLD AUTO: 223 K/UL — SIGNIFICANT CHANGE UP (ref 150–400)
POTASSIUM SERPL-MCNC: 4.1 MMOL/L — SIGNIFICANT CHANGE UP (ref 3.5–5.3)
POTASSIUM SERPL-SCNC: 4.1 MMOL/L — SIGNIFICANT CHANGE UP (ref 3.5–5.3)
PROCALCITONIN SERPL-MCNC: 0.57 NG/ML — HIGH (ref 0.02–0.1)
PROT SERPL-MCNC: 6.2 GM/DL — SIGNIFICANT CHANGE UP (ref 6–8.3)
RBC # BLD: 4.46 M/UL — SIGNIFICANT CHANGE UP (ref 4.2–5.8)
RBC # FLD: 16.2 % — HIGH (ref 10.3–14.5)
S AUREUS DNA NOSE QL NAA+PROBE: DETECTED
SARS-COV-2 RNA SPEC QL NAA+PROBE: SIGNIFICANT CHANGE UP
SODIUM SERPL-SCNC: 147 MMOL/L — HIGH (ref 135–145)
WBC # BLD: 10.42 K/UL — SIGNIFICANT CHANGE UP (ref 3.8–10.5)
WBC # FLD AUTO: 10.42 K/UL — SIGNIFICANT CHANGE UP (ref 3.8–10.5)

## 2022-02-20 PROCEDURE — 99233 SBSQ HOSP IP/OBS HIGH 50: CPT

## 2022-02-20 RX ORDER — SODIUM CHLORIDE 9 MG/ML
1000 INJECTION, SOLUTION INTRAVENOUS
Refills: 0 | Status: DISCONTINUED | OUTPATIENT
Start: 2022-02-20 | End: 2022-02-22

## 2022-02-20 RX ORDER — ALBUTEROL 90 UG/1
2 AEROSOL, METERED ORAL EVERY 6 HOURS
Refills: 0 | Status: DISCONTINUED | OUTPATIENT
Start: 2022-02-20 | End: 2022-02-28

## 2022-02-20 RX ADMIN — ATORVASTATIN CALCIUM 20 MILLIGRAM(S): 80 TABLET, FILM COATED ORAL at 22:33

## 2022-02-20 RX ADMIN — ENOXAPARIN SODIUM 50 MILLIGRAM(S): 100 INJECTION SUBCUTANEOUS at 13:20

## 2022-02-20 RX ADMIN — Medication 50 MILLILITER(S): at 05:17

## 2022-02-20 RX ADMIN — Medication 100 MILLIGRAM(S): at 09:09

## 2022-02-20 RX ADMIN — CEFEPIME 100 MILLIGRAM(S): 1 INJECTION, POWDER, FOR SOLUTION INTRAMUSCULAR; INTRAVENOUS at 08:57

## 2022-02-20 RX ADMIN — PANTOPRAZOLE SODIUM 40 MILLIGRAM(S): 20 TABLET, DELAYED RELEASE ORAL at 05:20

## 2022-02-20 RX ADMIN — BICALUTAMIDE 50 MILLIGRAM(S): 50 TABLET, FILM COATED ORAL at 13:29

## 2022-02-20 RX ADMIN — SODIUM CHLORIDE 50 MILLILITER(S): 9 INJECTION, SOLUTION INTRAVENOUS at 16:38

## 2022-02-20 NOTE — PROGRESS NOTE ADULT - SUBJECTIVE AND OBJECTIVE BOX
INTERVAL HPI:  89M with HTN, CHF?, Prostate Ca who presents to the ED for dyspnea, wheezing,  wet cough, and decreased appetite for two weeks.  Found to have PE, Pneumonia, Dehydration, hypernatremia and Renal insuffiencey. Blood cultures are growing staph Aureus.  Not able to provide more details.  Started on IV heparin, IV fluids, NRB mask and antibiotics.     OVERNIGHT EVENTS:  Resting , responsive.    Vital Signs Last 24 Hrs  T(C): 36.4 (20 Feb 2022 17:00), Max: 36.4 (20 Feb 2022 17:00)  T(F): 97.6 (20 Feb 2022 17:00), Max: 97.6 (20 Feb 2022 17:00)  HR: 74 (20 Feb 2022 17:00) (54 - 84)  BP: 120/74 (20 Feb 2022 17:00) (97/70 - 120/74)  BP(mean): --  RR: 18 (20 Feb 2022 17:00) (18 - 19)  SpO2: 96% (20 Feb 2022 17:00) (90% - 100%)    PHYSICAL EXAM:  GEN:         Awake, responsive and comfortable.  HEENT:    Normal.    RESP:       decreased air entry  CVS:          Regular rate and rhythm.     MEDICATIONS  (STANDING):  atorvastatin 20 milliGRAM(s) Oral at bedtime  bicalutamide 50 milliGRAM(s) Oral daily  cefepime   IVPB      cefepime   IVPB 2000 milliGRAM(s) IV Intermittent every 24 hours  dextrose 5% + lactated ringers. 1000 milliLiter(s) (50 mL/Hr) IV Continuous <Continuous>  enoxaparin Injectable 50 milliGRAM(s) SubCutaneous daily  pantoprazole    Tablet 40 milliGRAM(s) Oral before breakfast  scopolamine 1 mG/72 Hr(s) Patch 1 Patch Transdermal every 72 hours  vancomycin  IVPB      vancomycin  IVPB 500 milliGRAM(s) IV Intermittent every 24 hours    LABS:                        12.2   10.42 )-----------( 223      ( 20 Feb 2022 08:11 )             38.8     02-20    147<H>  |  115<H>  |  21  ----------------------------<  56<L>  4.1   |  21<L>  |  1.40<H>    Ca    8.5      20 Feb 2022 08:11  Phos  4.8     02-19  Mg     2.0     02-19    TPro  6.2  /  Alb  2.0<L>  /  TBili  0.6  /  DBili  x   /  AST  94<H>  /  ALT  14  /  AlkPhos  74  02-20    02-14 @ 17:42  pH: 7.40  pCO2: 39  pO2: 121  SaO2: 99.4    ASSESSMENT AND PLAN:  ·	Acute PE.  ·	RLL pneumonia.  ·	Staph Aureus bacteremia.  ·	Dehydration.  ·	Hypernatremia.  ·	Renal Insuffiencey  ·	Prostate CA.  ·	HTN.  ·	RUL+ RML atelectasis.  ·	Right pleural effusion.    SPO2 % on room air per RN.  On chest vest for airway clearance  Antibiotics per ID.  Add Albuterol MDI

## 2022-02-20 NOTE — PROGRESS NOTE ADULT - ASSESSMENT
a 89M with a PMH of CHF?, prostate Ca who presents to the ED for dyspnea.  Patient currently AAOx1, unable to provide history.  Patient reportedly noted to have decreased appetite and wheezing by his HHA today who called EMS.  No current complaints.  SpO2 currently 96 on 4L via NC.  Vitals stable, labs show mild leukocytosis.  Will admit to tele.         Metabolic encephalopathy.    Hx of failure to thrive   - Unclear baseline mental status.  Patient presents with hypothermia,   As per son- was congested/ not eating much   Usually pt is bedbound on a wheelchair for most of the day-mainly due to general weakness for over more than 6months, has dementia  Hx of shin wound- and was told not to move around too much for the wound to heal     -Pt improving, speaking in full sentences, passed speech and swallow- on pureed diet      Bacteremia  one blood culture from 2/14 growing staph aureus  Repeat blood cultures negative  CXR performed on 2/18- showing Complete opacification of RIGHT hemithorax.   -reviewed CT of chest/abd/pelvis   -antibiotics changed to Vanc/Cefepime- due to severe PNA     -As per Podiatry- less likely source of infection being the right hallux, awaiting official xray reports  -ECHO- no signs of endocarditis, EF-60-65%, mitral annual calcification     Acute pulmonary embolus.    RLL Pneumonia.   Mucous plug  Underlying COPD  CTA-Pulmonary embolism in the segmental right lower lobe  switched heparin drip to lovenox (theraputic dose) since pt agitated and not easy stick  Will need to switch to a DOAC but will need to estimate risks/vs benefits given hx of gastric ulcers and hx of bleeding in past  -continue antibiotics  -continue respiratory suctioning, chest vest  -will also put IR consult for possible need of thoracentesis chest tube placement  -Pulm following pt     Troponin level elevated.   - Troponin elevated-most likely demand ischemia  No current chest pain.   As per Cardiology - "no concomitant evidence of acute ischemia clinically or on ECG - likely type II MI in setting of CAP/PE."       Chronic CHF.   - spironolactone (on hold)   Holding Lasix and aldactone now in setting of hypernatremia and dehydration   -Received two doses of IV 40mg Lasix due to congestion on 2/18  ASA relatively contraindicated due to recurrent GI bleed in the past  Bbl relatively contraindicated due to emphysema, smoking history  will restart on Statin   ECHO reviewed- EF preserved          MANA (acute kidney injury).   -monitor      : Hypernatremia.   -resolved  Hypokalemia  -continue to supplement and monitor       Prostate cancer.   ·  Plan: bicalutamide.      Right hallux and heal wounds   -continue wound care  -follow up with Podiatry   -xray no acute findings     dvtppx- Lovenox therapeutic dose         Stephane Jason- [phone]---Son is going away on a business  trip today- won't be able to come to hospital daily now---please give an update on the phone-would like to hear from the doctor   [facility] signed- pt is DNR/DNI     a 89M with a PMH of CHF?, prostate Ca who presents to the ED for dyspnea.  Patient currently AAOx1, unable to provide history.  Patient reportedly noted to have decreased appetite and wheezing by his HHA today who called EMS.  No current complaints.  SpO2 currently 96 on 4L via NC.  Vitals stable, labs show mild leukocytosis.  Will admit to tele.         Metabolic encephalopathy.    Hx of failure to thrive   - Unclear baseline mental status.  Patient presents with hypothermia,   As per son- was congested/ not eating much   Usually pt is bedbound on a wheelchair for most of the day-mainly due to general weakness for over more than 6months, has dementia but ao   Hx of shin wound- and was told not to move around too much for the wound to heal     -Pt improving, speaking in full sentences, passed speech and swallow- on pureed diet, not eating much today- will also add D5       Bacteremia  one blood culture from 2/14 growing staph aureus  Repeat blood cultures negative  CXR performed on 2/18- showing Complete opacification of RIGHT hemithorax.   -reviewed CT of chest/abd/pelvis   -antibiotics changed to Vanc/Cefepime- due to severe PNA     -As per Podiatry- less likely source of infection being the right hallux, awaiting official xray reports  -ECHO- no signs of endocarditis, EF-60-65%, mitral annual calcification  -ID following      Acute pulmonary embolus.    RLL Pneumonia.   Mucous plug  Underlying COPD  CTA-Pulmonary embolism in the segmental right lower lobe  switched heparin drip to lovenox (theraputic dose) since pt agitated and not easy stick  Will need to switch to a DOAC but will need to estimate risks/vs benefits given hx of gastric ulcers and hx of bleeding in past  -continue antibiotics  -continue respiratory suctioning, chest vest  -will also put IR consult for possible need of thoracentesis chest tube placement---SPOKE WITH IR- Jose Fallon---CT shows mostly atelectasis secondary to obstructed right mainstem bronchus. IF any invasive treatment-pt could benefit from bronchoscopy--risks and benefits of procedure given pt deconditioned state.   -Pulm following pt  -Pallative on board as well      Troponin level elevated.   - Troponin elevated-most likely demand ischemia  No current chest pain.   As per Cardiology - "no concomitant evidence of acute ischemia clinically or on ECG - likely type II MI in setting of CAP/PE."       Chronic CHF.   - spironolactone (on hold)   Holding Lasix and aldactone now in setting of hypernatremia and dehydration   -Received two doses of IV 40mg Lasix due to congestion on 2/18  ASA relatively contraindicated due to recurrent GI bleed in the past  Bbl relatively contraindicated due to emphysema, smoking history  will restart on Statin   ECHO reviewed- EF preserved          AMNA (acute kidney injury).   -monitor      : Hypernatremia.   -resolved  Hypokalemia  -continue to supplement and monitor       Prostate cancer.   ·  Plan: bicalutamide.      Right hallux and heal wounds   -continue wound care  -follow up with Podiatry   -xray no acute findings     dvtppx- Lovenox therapeutic dose         Stephane Jason- [phone]---Son is going away on a business  trip today- won't be able to come to hospital daily now---please give an update on the phone-would like to hear from the doctor   [facility] signed- pt is DNR/DNI     a 89M with a PMH of CHF?, prostate Ca who presents to the ED for dyspnea.  Patient currently AAOx1, unable to provide history.  Patient reportedly noted to have decreased appetite and wheezing by his HHA today who called EMS.  No current complaints.  SpO2 currently 96 on 4L via NC.  Vitals stable, labs show mild leukocytosis.  Will admit to tele.         Metabolic encephalopathy.    Hx of failure to thrive   - Unclear baseline mental status.  Patient presents with hypothermia,   As per son- was congested/ not eating much   Usually pt is bedbound on a wheelchair for most of the day-mainly due to general weakness for over more than 6months, has dementia but ao   Hx of shin wound- and was told not to move around too much for the wound to heal     -Pt improving, speaking in full sentences, passed speech and swallow- on pureed diet, not eating much today- will also add D5       Bacteremia  one blood culture from 2/14 growing staph aureus  Repeat blood cultures negative  CXR performed on 2/18- showing Complete opacification of RIGHT hemithorax.   -reviewed CT of chest/abd/pelvis   -antibiotics changed to Vanc/Cefepime- due to severe PNA     -As per Podiatry- less likely source of infection being the right hallux, awaiting official xray reports  -ECHO- no signs of endocarditis, EF-60-65%, mitral annual calcification  -ID following      Acute pulmonary embolus.    RLL Pneumonia.   Mucous plug  Underlying COPD  CTA-Pulmonary embolism in the segmental right lower lobe  switched heparin drip to lovenox (theraputic dose) since pt agitated and not easy stick  Will need to switch to a DOAC but will need to estimate risks/vs benefits given hx of gastric ulcers and hx of bleeding in past  -continue antibiotics  -continue respiratory suctioning, chest vest  -will also put IR consult for possible need of thoracentesis chest tube placement---SPOKE WITH IR- Jose Fallon---CT shows mostly atelectasis secondary to obstructed right mainstem bronchus. IF any invasive treatment-pt could benefit from bronchoscopy--risks and benefits of procedure given pt deconditioned state. Spoke with Dr. Elma Huang--CT Surgery-will evaluate pt for possible Bronchoscopy   -Pulm following pt  -Pallative on board as well      Troponin level elevated.   - Troponin elevated-most likely demand ischemia  No current chest pain.   As per Cardiology - "no concomitant evidence of acute ischemia clinically or on ECG - likely type II MI in setting of CAP/PE."       Chronic CHF.   - spironolactone (on hold)   Holding Lasix and aldactone now in setting of hypernatremia and dehydration   -Received two doses of IV 40mg Lasix due to congestion on 2/18  ASA relatively contraindicated due to recurrent GI bleed in the past  Bbl relatively contraindicated due to emphysema, smoking history  will restart on Statin   ECHO reviewed- EF preserved          AMNA (acute kidney injury).   -monitor      : Hypernatremia.   -resolved  Hypokalemia  -continue to supplement and monitor       Prostate cancer.   ·  Plan: bicalutamide.      Right hallux and heal wounds   -continue wound care  -follow up with Podiatry   -xray no acute findings     dvtppx- Lovenox therapeutic dose         Stephane Jason- [phone]---Son is going away on a business  trip today- won't be able to come to hospital daily now---please give an update on the phone-would like to hear from the doctor   [facility] signed- pt is DNR/DNI

## 2022-02-20 NOTE — PROGRESS NOTE ADULT - SUBJECTIVE AND OBJECTIVE BOX
Patient is a 89y old  Male who presents with a chief complaint of PE, PNA? (14 Feb 2022 08:40)      INTERVAL HPI/OVERNIGHT EVENTS: Pt having severe congestion yesterday late afternoon/evening---today looks a bit better, alert on venti mask satting 100%  WBC-decreased today from 14K to 10K  Lactic acid -decreasing     MEDICATIONS  (STANDING):  atorvastatin 20 milliGRAM(s) Oral at bedtime  bicalutamide 50 milliGRAM(s) Oral daily  cefepime   IVPB      cefepime   IVPB 2000 milliGRAM(s) IV Intermittent every 24 hours  enoxaparin Injectable 50 milliGRAM(s) SubCutaneous daily  pantoprazole    Tablet 40 milliGRAM(s) Oral before breakfast  scopolamine 1 mG/72 Hr(s) Patch 1 Patch Transdermal every 72 hours  vancomycin  IVPB      vancomycin  IVPB 500 milliGRAM(s) IV Intermittent every 24 hours    MEDICATIONS  (PRN):          Allergies    No Known Allergies    Intolerances        REVIEW OF SYSTEMS:  unable to obtain fully due to mentation but denies pain, states that is hungry     ICU Vital Signs Last 24 Hrs  T(C): 36.3 (20 Feb 2022 11:10), Max: 36.7 (19 Feb 2022 18:12)  T(F): 97.3 (20 Feb 2022 11:10), Max: 98 (19 Feb 2022 18:12)  HR: 84 (20 Feb 2022 11:10) (54 - 84)  BP: 116/74 (20 Feb 2022 11:10) (97/63 - 116/74)  BP(mean): --  ABP: --  ABP(mean): --  RR: 18 (20 Feb 2022 11:10) (18 - 19)  SpO2: 90% (20 Feb 2022 11:10) (90% - 100%)        PHYSICAL EXAM:  GENERAL: NAD, well-groomed, well-developed, very thin, speaking in full sentences, still on nonrebreather  HEAD:  Atraumatic, Normocephalic  EYES: EOMI, PERRLA, conjunctiva and sclera clear  ENMT: No tonsillar erythema, exudates, or enlargement; Moist mucous membranes, Good dentition, No lesions  NECK: Supple, No JVD,  NERVOUS SYSTEM:  Alert & Oriented X1,   CHEST/LUNG: diminished sounds on right lung, no wheezing, or rubs  HEART: Regular rate and rhythm; No murmurs, rubs, or gallops  ABDOMEN: Thin,, Soft, Nontender, Nondistended; Bowel sounds present  EXTREMITIES:  2+ Peripheral Pulses, No clubbing, cyanosis, or edema, right hallux and heal wtih dry eschars   SKIN: No rashes or lesions    LABS:                                                                      12.2   10.42 )-----------( 223      ( 20 Feb 2022 08:11 )             38.8     02-20    147<H>  |  115<H>  |  21  ----------------------------<  56<L>  4.1   |  21<L>  |  1.40<H>    Ca    8.5      20 Feb 2022 08:11  Phos  4.8     02-19  Mg     2.0     02-19    TPro  6.2  /  Alb  2.0<L>  /  TBili  0.6  /  DBili  x   /  AST  94<H>  /  ALT  14  /  AlkPhos  74  02-20                                     Patient is a 89y old  Male who presents with a chief complaint of PE, PNA? (14 Feb 2022 08:40)      INTERVAL HPI/OVERNIGHT EVENTS: Pt doing bit better today- sating 100% on room air. however as per nursing-does not want to eat at all.    WBC-decreased today from 14K to 10K  Lactic acid -decreasing     MEDICATIONS  (STANDING):  atorvastatin 20 milliGRAM(s) Oral at bedtime  bicalutamide 50 milliGRAM(s) Oral daily  cefepime   IVPB      cefepime   IVPB 2000 milliGRAM(s) IV Intermittent every 24 hours  enoxaparin Injectable 50 milliGRAM(s) SubCutaneous daily  pantoprazole    Tablet 40 milliGRAM(s) Oral before breakfast  scopolamine 1 mG/72 Hr(s) Patch 1 Patch Transdermal every 72 hours  vancomycin  IVPB      vancomycin  IVPB 500 milliGRAM(s) IV Intermittent every 24 hours    MEDICATIONS  (PRN):          Allergies    No Known Allergies    Intolerances        REVIEW OF SYSTEMS:  unable to obtain fully due to mentation but denies pain, states that is hungry     ICU Vital Signs Last 24 Hrs  T(C): 36.3 (20 Feb 2022 11:10), Max: 36.7 (19 Feb 2022 18:12)  T(F): 97.3 (20 Feb 2022 11:10), Max: 98 (19 Feb 2022 18:12)  HR: 84 (20 Feb 2022 11:10) (54 - 84)  BP: 116/74 (20 Feb 2022 11:10) (97/63 - 116/74)  BP(mean): --  ABP: --  ABP(mean): --  RR: 18 (20 Feb 2022 11:10) (18 - 19)  SpO2: 90% (20 Feb 2022 11:10) (90% - 100%)        PHYSICAL EXAM:  GENERAL: NAD, well-groomed, well-developed, very thin, speaking in full sentences, still on nonrebreather  HEAD:  Atraumatic, Normocephalic  EYES: EOMI, PERRLA, conjunctiva and sclera clear  ENMT: No tonsillar erythema, exudates, or enlargement; Moist mucous membranes, Good dentition, No lesions  NECK: Supple, No JVD,  NERVOUS SYSTEM:  Alert & Oriented X1,   CHEST/LUNG: diminished sounds on right lung, no wheezing, or rubs  HEART: Regular rate and rhythm; No murmurs, rubs, or gallops  ABDOMEN: Thin,, Soft, Nontender, Nondistended; Bowel sounds present  EXTREMITIES:  2+ Peripheral Pulses, No clubbing, cyanosis, or edema, right hallux and heal wtih dry eschars   SKIN: No rashes or lesions    LABS:                                                                      12.2   10.42 )-----------( 223      ( 20 Feb 2022 08:11 )             38.8     02-20    147<H>  |  115<H>  |  21  ----------------------------<  56<L>  4.1   |  21<L>  |  1.40<H>    Ca    8.5      20 Feb 2022 08:11  Phos  4.8     02-19  Mg     2.0     02-19    TPro  6.2  /  Alb  2.0<L>  /  TBili  0.6  /  DBili  x   /  AST  94<H>  /  ALT  14  /  AlkPhos  74  02-20

## 2022-02-21 DIAGNOSIS — Z01.89 ENCOUNTER FOR OTHER SPECIFIED SPECIAL EXAMINATIONS: ICD-10-CM

## 2022-02-21 LAB
ANION GAP SERPL CALC-SCNC: 9 MMOL/L — SIGNIFICANT CHANGE UP (ref 5–17)
BUN SERPL-MCNC: 18 MG/DL — SIGNIFICANT CHANGE UP (ref 7–23)
CALCIUM SERPL-MCNC: 8.6 MG/DL — SIGNIFICANT CHANGE UP (ref 8.5–10.1)
CHLORIDE SERPL-SCNC: 118 MMOL/L — HIGH (ref 96–108)
CO2 SERPL-SCNC: 21 MMOL/L — LOW (ref 22–31)
CREAT SERPL-MCNC: 1.15 MG/DL — SIGNIFICANT CHANGE UP (ref 0.5–1.3)
CULTURE RESULTS: SIGNIFICANT CHANGE UP
GLUCOSE SERPL-MCNC: 88 MG/DL — SIGNIFICANT CHANGE UP (ref 70–99)
HCT VFR BLD CALC: 38.9 % — LOW (ref 39–50)
HGB BLD-MCNC: 12.2 G/DL — LOW (ref 13–17)
MCHC RBC-ENTMCNC: 28.2 PG — SIGNIFICANT CHANGE UP (ref 27–34)
MCHC RBC-ENTMCNC: 31.4 G/DL — LOW (ref 32–36)
MCV RBC AUTO: 89.8 FL — SIGNIFICANT CHANGE UP (ref 80–100)
NRBC # BLD: 0 /100 WBCS — SIGNIFICANT CHANGE UP (ref 0–0)
PLATELET # BLD AUTO: 200 K/UL — SIGNIFICANT CHANGE UP (ref 150–400)
POTASSIUM SERPL-MCNC: 4 MMOL/L — SIGNIFICANT CHANGE UP (ref 3.5–5.3)
POTASSIUM SERPL-SCNC: 4 MMOL/L — SIGNIFICANT CHANGE UP (ref 3.5–5.3)
RBC # BLD: 4.33 M/UL — SIGNIFICANT CHANGE UP (ref 4.2–5.8)
RBC # FLD: 16.7 % — HIGH (ref 10.3–14.5)
SODIUM SERPL-SCNC: 148 MMOL/L — HIGH (ref 135–145)
SPECIMEN SOURCE: SIGNIFICANT CHANGE UP
VANCOMYCIN TROUGH SERPL-MCNC: 5.7 UG/ML — LOW (ref 10–20)
WBC # BLD: 9.55 K/UL — SIGNIFICANT CHANGE UP (ref 3.8–10.5)
WBC # FLD AUTO: 9.55 K/UL — SIGNIFICANT CHANGE UP (ref 3.8–10.5)

## 2022-02-21 PROCEDURE — 99233 SBSQ HOSP IP/OBS HIGH 50: CPT

## 2022-02-21 RX ADMIN — SCOPALAMINE 1 PATCH: 1 PATCH, EXTENDED RELEASE TRANSDERMAL at 19:37

## 2022-02-21 RX ADMIN — BICALUTAMIDE 50 MILLIGRAM(S): 50 TABLET, FILM COATED ORAL at 11:50

## 2022-02-21 RX ADMIN — ENOXAPARIN SODIUM 50 MILLIGRAM(S): 100 INJECTION SUBCUTANEOUS at 11:50

## 2022-02-21 RX ADMIN — ATORVASTATIN CALCIUM 20 MILLIGRAM(S): 80 TABLET, FILM COATED ORAL at 21:24

## 2022-02-21 RX ADMIN — SODIUM CHLORIDE 50 MILLILITER(S): 9 INJECTION, SOLUTION INTRAVENOUS at 21:29

## 2022-02-21 RX ADMIN — PANTOPRAZOLE SODIUM 40 MILLIGRAM(S): 20 TABLET, DELAYED RELEASE ORAL at 05:13

## 2022-02-21 RX ADMIN — CEFEPIME 100 MILLIGRAM(S): 1 INJECTION, POWDER, FOR SOLUTION INTRAMUSCULAR; INTRAVENOUS at 11:50

## 2022-02-21 RX ADMIN — SODIUM CHLORIDE 50 MILLILITER(S): 9 INJECTION, SOLUTION INTRAVENOUS at 05:12

## 2022-02-21 NOTE — PROGRESS NOTE ADULT - SUBJECTIVE AND OBJECTIVE BOX
INTERVAL HPI/OVERNIGHT EVENTS:  89y  Vital Signs Last 24 Hrs  T(C): 36.8 (21 Feb 2022 10:48), Max: 36.8 (21 Feb 2022 10:48)  T(F): 98.2 (21 Feb 2022 10:48), Max: 98.2 (21 Feb 2022 10:48)  HR: 77 (21 Feb 2022 10:48) (38 - 84)  BP: 113/72 (21 Feb 2022 10:48) (96/61 - 120/74)  BP(mean): --  RR: 18 (21 Feb 2022 10:48) (18 - 20)  SpO2: 97% (21 Feb 2022 10:48) (90% - 100%)  I&O's Summary    MEDICATIONS  (STANDING):  ALBUTerol    90 MICROgram(s) HFA Inhaler 2 Puff(s) Inhalation every 6 hours  atorvastatin 20 milliGRAM(s) Oral at bedtime  bicalutamide 50 milliGRAM(s) Oral daily  cefepime   IVPB 2000 milliGRAM(s) IV Intermittent every 24 hours  cefepime   IVPB      dextrose 5% + lactated ringers. 1000 milliLiter(s) (50 mL/Hr) IV Continuous <Continuous>  enoxaparin Injectable 50 milliGRAM(s) SubCutaneous daily  pantoprazole    Tablet 40 milliGRAM(s) Oral before breakfast  scopolamine 1 mG/72 Hr(s) Patch 1 Patch Transdermal every 72 hours    MEDICATIONS  (PRN):    LABS:    trop                        12.2   10.42 )-----------( 223      ( 20 Feb 2022 08:11 )             38.8     02-20    147<H>  |  115<H>  |  21  ----------------------------<  56<L>  4.1   |  21<L>  |  1.40<H>    Ca    8.5      20 Feb 2022 08:11    TPro  6.2  /  Alb  2.0<L>  /  TBili  0.6  /  DBili  x   /  AST  94<H>  /  ALT  14  /  AlkPhos  74  02-20        CAPILLARY BLOOD GLUCOSE              REVIEW OF SYSTEMS:  CONSTITUTIONAL: No fever, weight loss, or fatigue  EYES: No eye pain, visual disturbances, or discharge  ENMT:  No difficulty hearing, tinnitus, vertigo; No sinus or throat pain  NECK: No pain or stiffness  BREASTS: No pain, masses, or nipple discharge  RESPIRATORY: No cough, wheezing, chills or hemoptysis; No shortness of breath  CARDIOVASCULAR: No chest pain, palpitations, dizziness, or leg swelling  GASTROINTESTINAL: No abdominal or epigastric pain. No nausea, vomiting, or hematemesis; No diarrhea or constipation. No melena or hematochezia.  GENITOURINARY: No dysuria, frequency, hematuria, or incontinence  NEUROLOGICAL: No headaches, memory loss, loss of strength, numbness, or tremors  SKIN: No itching, burning, rashes, or lesions   LYMPH NODES: No enlarged glands  ENDOCRINE: No heat or cold intolerance; No hair loss  MUSCULOSKELETAL: No joint pain or swelling; No muscle, back, or extremity pain  PSYCHIATRIC: No depression, anxiety, mood swings, or difficulty sleeping  HEME/LYMPH: No easy bruising, or bleeding gums  ALLERY AND IMMUNOLOGIC: No hives or eczema    RADIOLOGY & ADDITIONAL TESTS:    Imaging Personally Reviewed:  [ ] YES  [ ] NO    Consultant(s) Notes Reviewed:  [x ] YES  [ ] NO    PHYSICAL EXAM:  GENERAL: NAD, well-groomed, well-developed  HEAD:  Atraumatic, Normocephalic  EYES: EOMI, PERRLA, conjunctiva and sclera clear  ENMT: No tonsillar erythema, exudates, or enlargement; Moist mucous membranes, Good dentition, No lesions  NECK: Supple, No JVD, Normal thyroid  NERVOUS SYSTEM:  Alert & Oriented X3, Good concentration; Motor Strength 5/5 B/L upper and lower extremities; DTRs 2+ intact and symmetric  CHEST/LUNG: Clear to percussion bilaterally; No rales, rhonchi, wheezing, or rubs  HEART: Regular rate and rhythm; No murmurs, rubs, or gallops  ABDOMEN: Soft, Nontender, Nondistended; Bowel sounds present  EXTREMITIES:  2+ Peripheral Pulses, No clubbing, cyanosis, or edema  LYMPH: No lymphadenopathy noted  SKIN: No rashes or lesions    A & P:        Care Discussed with Consultants/Other Providers [ x] YES  [ ] NO

## 2022-02-21 NOTE — CONSULT NOTE ADULT - CONSULT REQUESTED DATE/TIME
21-Feb-2022 12:50
15-Feb-2022 11:44
16-Feb-2022 13:19
15-Feb-2022 19:30
14-Feb-2022 08:40
16-Feb-2022 12:32

## 2022-02-21 NOTE — PROGRESS NOTE ADULT - ASSESSMENT
a 89M with a PMH of CHF?, prostate Ca who presents to the ED for dyspnea.  Patient currently AAOx1, unable to provide history.  Patient reportedly noted to have decreased appetite and wheezing by his HHA today who called EMS.  No current complaints.  SpO2 currently 96 on 4L via NC.  Vitals stable, labs show mild leukocytosis.  Will admit to tele.         Metabolic encephalopathy.    Hx of failure to thrive   - Unclear baseline mental status.  Patient presents with hypothermia,   As per son- was congested/ not eating much   Usually pt is bedbound on a wheelchair for most of the day-mainly due to general weakness for over more than 6months, has dementia but ao   Hx of shin wound- and was told not to move around too much for the wound to heal     -Pt improving, speaking in full sentences, passed speech and swallow- on pureed diet, not eating much today- will also add D5       Bacteremia  one blood culture from 2/14 growing staph aureus  Repeat blood cultures negative  CXR performed on 2/18- showing Complete opacification of RIGHT hemithorax.   -reviewed CT of chest/abd/pelvis   -antibiotics changed to Vanc/Cefepime- due to severe PNA     -As per Podiatry- less likely source of infection being the right hallux, awaiting official xray reports  -ECHO- no signs of endocarditis, EF-60-65%, mitral annual calcification  -ID following      Acute pulmonary embolus.    RLL Pneumonia.   Mucous plug  Underlying COPD  CTA-Pulmonary embolism in the segmental right lower lobe  switched heparin drip to lovenox (theraputic dose) since pt agitated and not easy stick  Will need to switch to a DOAC but will need to estimate risks/vs benefits given hx of gastric ulcers and hx of bleeding in past  -continue antibiotics  -continue respiratory suctioning, chest vest  -will also put IR consult for possible need of thoracentesis chest tube placement---SPOKE WITH IR- Jose Fallon---CT shows mostly atelectasis secondary to obstructed right mainstem bronchus. IF any invasive treatment-pt could benefit from bronchoscopy--risks and benefits of procedure given pt deconditioned state. Spoke with Dr. Elma Huang--CT Surgery-will evaluate pt for possible Bronchoscopy   -Pulm following pt  -Pallative on board as well      Troponin level elevated.   - Troponin elevated-most likely demand ischemia  No current chest pain.   As per Cardiology - "no concomitant evidence of acute ischemia clinically or on ECG - likely type II MI in setting of CAP/PE."       Chronic CHF.   - spironolactone (on hold)   Holding Lasix and aldactone now in setting of hypernatremia and dehydration   -Received two doses of IV 40mg Lasix due to congestion on 2/18  ASA relatively contraindicated due to recurrent GI bleed in the past  Bbl relatively contraindicated due to emphysema, smoking history  will restart on Statin   ECHO reviewed- EF preserved          AMNA (acute kidney injury).   -monitor      : Hypernatremia.   -resolved  Hypokalemia  -continue to supplement and monitor       Prostate cancer.   ·  Plan: bicalutamide.      Right hallux and heal wounds   -continue wound care  -follow up with Podiatry   -xray no acute findings     dvtppx- Lovenox therapeutic dose         Stephane Jason- [phone]---Son is going away on a business  trip today- won't be able to come to hospital daily now---please give an update on the phone-would like to hear from the doctor   [facility] signed- pt is DNR/DNI     a 89M with a PMH of CHF?, prostate Ca who presents to the ED for dyspnea.  Patient currently AAOx1, unable to provide history.  Patient reportedly noted to have decreased appetite and wheezing by his HHA today who called EMS.  No current complaints.  SpO2 currently 96 on 4L via NC.  Vitals stable, labs show mild leukocytosis.  Will admit to tele.      Metabolic encephalopathy.    Hx of failure to thrive   - Unclear baseline mental status.  Patient presents with hypothermia,   As per son- was congested/ not eating much   Usually pt is bedbound on a wheelchair for most of the day-mainly due to general weakness for over more than 6months, has dementia but ao   Hx of shin wound- and was told not to move around too much for the wound to heal     passed speech and swallow- on pureed diet, not eating much today- will also add D5   Pt's son refused NGT at this time.    Bacteremia  one blood culture from 2/14 growing staph aureus  Repeat blood cultures negative  CXR performed on 2/18- showing Complete opacification of RIGHT hemithorax.   -reviewed CT of chest/abd/pelvis   -antibiotics changed to Vanc/Cefepime- due to severe PNA     -As per Podiatry- less likely source of infection being the right hallux, awaiting official xray reports  -ECHO- no signs of endocarditis, EF-60-65%, mitral annual calcification  -ID following      Acute pulmonary embolus.   RLL Pneumonia.   Mucous plug  Underlying COPD  CTA-Pulmonary embolism in the segmental right lower lobe  switched heparin drip to lovenox (theraputic dose) since pt agitated and not easy stick  Will need to switch to a DOAC but will need to estimate risks/vs benefits given hx of gastric ulcers and hx of bleeding in past  -continue antibiotics  -continue respiratory suctioning, chest vest  -will also put IR consult for possible need of thoracentesis chest tube placement---SPOKE WITH IR- Jose Fallon---CT shows mostly atelectasis secondary to obstructed right mainstem bronchus. IF any invasive treatment-pt could benefit from bronchoscopy--risks and benefits of procedure given pt deconditioned state. Spoke with Dr. Elma Huang--CT Surgery-will evaluate pt for possible Bronchoscopy   -Pulm following pt  -Pallative on board as well      Troponin level elevated.   - Troponin elevated-most likely demand ischemia  No current chest pain.   As per Cardiology - "no concomitant evidence of acute ischemia clinically or on ECG - likely type II MI in setting of CAP/PE."    Chronic CHF.   - spironolactone (on hold)   Holding Lasix and aldactone now in setting of hypernatremia and dehydration   -Received two doses of IV 40mg Lasix due to congestion on 2/18  ASA relatively contraindicated due to recurrent GI bleed in the past  Bbl relatively contraindicated due to emphysema, smoking history  will restart on Statin   ECHO reviewed- EF preserved     AMNA (acute kidney injury).   -monitor    Hypernatremia.   -resolved  Hypokalemia  -continue to supplement and monitor     Prostate cancer.   ·  Plan: bicalutamide.    Right hallux and heal wounds   -continue wound care  -follow up with Podiatry   -xray no acute findings     dvtppx- Lovenox therapeutic dose         Stephane Jason- [phone]---Son is going away on a business  trip today- won't be able to come to hospital daily now---please give an update on the phone-would like to hear from the doctor   [facility] signed- pt is DNR/DNI  Spoke with Son who wants his father to be comfortable, Hospice care eval.

## 2022-02-21 NOTE — CONSULT NOTE ADULT - SUBJECTIVE AND OBJECTIVE BOX
Asked to see patient for possible bronchoscopy.  Briefly, 89 yo male admitted one week ago with dyspnea and weakness.  Patient is WC dependent with severe scoliosis and non-ambulatory.  Found to have mild leukocytosis and RLL infiltrate on CXR.  Imaging studies showed progression of infiltrate to complete right lung opacification.  CT reveals severe scoliosis with distortion of normal chest anatomy and likely chronic atelectasis of RLL.  Now with atelectasis/infiltrate of RUL.  Patient currently in bed on RA with aphasia moving all extremities.  Appears confused but denies pain or dyspnea when asked.      PMH:  Prostate cancer, HTN, HLD, CHF    PSH:  tonsillectomy    Meds:  See list    NKDA    Soc:  unknown    ROS:  unobtainable, history obtained from chart.  No family to discuss.  According to roommate in hospital, patient is improving    PE:      Gen:  Awake, oriented x1  Neck: no JVD  Lungs: diminished BS on right  Cor: regular  Abd: scaphoid  MSK:  severe contractures BLE, severe scoliosis  Neuro: not following commands    CT chest as above

## 2022-02-21 NOTE — CONSULT NOTE ADULT - ASSESSMENT
Acute dyspnea/failure to thrive - Altered mental status?  Suspect community acquired pneumonia, COPD underlying?  RLL Pulmonary Embolism  Elevated cardiac enzymes - no concomitant evidence of acute ischemia clinically or on ECG - likely type II MI in setting of CAP/PE.    Plan:  Continue treatment for CAP with antibiotics as per medicine.  On IV heparin for PE, cautious dosing given history of multiple GI bleeds. Will need to segue to DOAC tomorrow and monitor H/H closely.  AMS likely due to underlying CAP, should improve with treatment. Baseline?  Elevated troponins now downtrending, likely type II MI. ASA relatively contraindicated due to recurrent GI bleed in the past. Would obtain echo to see if evidence of WMA that would lead us to consider ischemic substrate. If EF normal and no WMA, would defer any cardiac evaluation or risk stratification for out patient management given age and generalized debility.  Bbl relatively contraindicated due to emphysema, smoking history. Start statins.         
Pt is 89yo who presents w/ right foot eschars  -pt seen and examined  -Afebrile, WBC 9  -Right hallux distal eschar with no periwound erythema, no fluctuance, no malodor, no acute signs of infection. Right heel eschar, dry and stable with intact borders, no erythema, no acute signs of infection.   -Right foot xray ordered  -No acute intervention at this time, right foot is unlikely source of bacteremia. Will wait for xrays before making final recommendations  -Pod plan for local wound care at this time  -Keep heels offloaded at all time  -Discussed w/ attending 
A/P  Likely chronic aspiration with mucus plugging.  Would be risky to perform bronchoscopy as patient may not be able to be extubated following procedure and has difficult airway based on cervical and thoracic anatomy.  Would recommend continued aggressive pulmonary toilet and abx.  Will follow with you.
Patient is a 89M with a PMH of CHF?, prostate Ca who presents to the ED for dyspnea.  Patient currently AAOx1, unable to provide history.  Patient reportedly noted to have decreased appetite and wheezing by his HHA today who called EMS.  No current complaints.  SpO2 currently 96 on 4L via NC.  Vitals stable, labs show mild leukocytosis.    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   Even if foot is not the source, he needs good wound care and podiatric treatment     MSSA bacteremia   high serum lactate   CHF   functional quadraplegia     Plan:   (Zosyn) Piperacillin/tazobactam 3.375 grams every 8 hours for now   repeat blood cultures every 48 hrs until clear   Transthoracic Echocardiogram   trend lactate   trend wbc   assuming no changes tomorrow ill likely change to cefazolin 2g q8hrs   consider CT a/p to look for focus of infection   caution with fluids if in fact has heart failure   frequent turns, offloading, and nutrition optimization to avoid bedsores    D/W Dr. Gwen Rey, DO  Infectious Disease Attending  Reachable via Microsoft Teams or ID office: [phone]  After 5pm/weekends please call [phone] for all inquiries and new consults       
89 year old male PMH HTN, heart failure, prostate cancer presented with dyspnea and found to have PE and bacteremic with s. aureus.  Patient on NRB.  Palliative care consulted for GOC.

## 2022-02-22 LAB
ANION GAP SERPL CALC-SCNC: 5 MMOL/L — SIGNIFICANT CHANGE UP (ref 5–17)
ANION GAP SERPL CALC-SCNC: 7 MMOL/L — SIGNIFICANT CHANGE UP (ref 5–17)
BUN SERPL-MCNC: 16 MG/DL — SIGNIFICANT CHANGE UP (ref 7–23)
BUN SERPL-MCNC: 17 MG/DL — SIGNIFICANT CHANGE UP (ref 7–23)
CALCIUM SERPL-MCNC: 8.1 MG/DL — LOW (ref 8.5–10.1)
CALCIUM SERPL-MCNC: 8.9 MG/DL — SIGNIFICANT CHANGE UP (ref 8.5–10.1)
CHLORIDE SERPL-SCNC: 118 MMOL/L — HIGH (ref 96–108)
CHLORIDE SERPL-SCNC: 118 MMOL/L — HIGH (ref 96–108)
CO2 SERPL-SCNC: 26 MMOL/L — SIGNIFICANT CHANGE UP (ref 22–31)
CO2 SERPL-SCNC: 26 MMOL/L — SIGNIFICANT CHANGE UP (ref 22–31)
CREAT SERPL-MCNC: 1.07 MG/DL — SIGNIFICANT CHANGE UP (ref 0.5–1.3)
CREAT SERPL-MCNC: 1.13 MG/DL — SIGNIFICANT CHANGE UP (ref 0.5–1.3)
CULTURE RESULTS: SIGNIFICANT CHANGE UP
GLUCOSE SERPL-MCNC: 101 MG/DL — HIGH (ref 70–99)
GLUCOSE SERPL-MCNC: 99 MG/DL — SIGNIFICANT CHANGE UP (ref 70–99)
HCT VFR BLD CALC: 38.8 % — LOW (ref 39–50)
HGB BLD-MCNC: 12.2 G/DL — LOW (ref 13–17)
LACTATE SERPL-SCNC: 3.5 MMOL/L — HIGH (ref 0.7–2)
MCHC RBC-ENTMCNC: 27.5 PG — SIGNIFICANT CHANGE UP (ref 27–34)
MCHC RBC-ENTMCNC: 31.4 G/DL — LOW (ref 32–36)
MCV RBC AUTO: 87.4 FL — SIGNIFICANT CHANGE UP (ref 80–100)
NRBC # BLD: 0 /100 WBCS — SIGNIFICANT CHANGE UP (ref 0–0)
PLATELET # BLD AUTO: 219 K/UL — SIGNIFICANT CHANGE UP (ref 150–400)
POTASSIUM SERPL-MCNC: 3.4 MMOL/L — LOW (ref 3.5–5.3)
POTASSIUM SERPL-MCNC: 3.4 MMOL/L — LOW (ref 3.5–5.3)
POTASSIUM SERPL-SCNC: 3.4 MMOL/L — LOW (ref 3.5–5.3)
POTASSIUM SERPL-SCNC: 3.4 MMOL/L — LOW (ref 3.5–5.3)
RBC # BLD: 4.44 M/UL — SIGNIFICANT CHANGE UP (ref 4.2–5.8)
RBC # FLD: 16.6 % — HIGH (ref 10.3–14.5)
SODIUM SERPL-SCNC: 149 MMOL/L — HIGH (ref 135–145)
SODIUM SERPL-SCNC: 151 MMOL/L — HIGH (ref 135–145)
SPECIMEN SOURCE: SIGNIFICANT CHANGE UP
WBC # BLD: 8.85 K/UL — SIGNIFICANT CHANGE UP (ref 3.8–10.5)
WBC # FLD AUTO: 8.85 K/UL — SIGNIFICANT CHANGE UP (ref 3.8–10.5)

## 2022-02-22 PROCEDURE — 99233 SBSQ HOSP IP/OBS HIGH 50: CPT

## 2022-02-22 PROCEDURE — 99232 SBSQ HOSP IP/OBS MODERATE 35: CPT

## 2022-02-22 RX ORDER — PANTOPRAZOLE SODIUM 20 MG/1
40 TABLET, DELAYED RELEASE ORAL DAILY
Refills: 0 | Status: DISCONTINUED | OUTPATIENT
Start: 2022-02-22 | End: 2022-02-23

## 2022-02-22 RX ORDER — SODIUM CHLORIDE 9 MG/ML
1000 INJECTION, SOLUTION INTRAVENOUS
Refills: 0 | Status: DISCONTINUED | OUTPATIENT
Start: 2022-02-22 | End: 2022-02-28

## 2022-02-22 RX ADMIN — SCOPALAMINE 1 PATCH: 1 PATCH, EXTENDED RELEASE TRANSDERMAL at 07:35

## 2022-02-22 RX ADMIN — BICALUTAMIDE 50 MILLIGRAM(S): 50 TABLET, FILM COATED ORAL at 11:36

## 2022-02-22 RX ADMIN — ALBUTEROL 2 PUFF(S): 90 AEROSOL, METERED ORAL at 00:15

## 2022-02-22 RX ADMIN — SODIUM CHLORIDE 75 MILLILITER(S): 9 INJECTION, SOLUTION INTRAVENOUS at 11:43

## 2022-02-22 RX ADMIN — ENOXAPARIN SODIUM 50 MILLIGRAM(S): 100 INJECTION SUBCUTANEOUS at 11:36

## 2022-02-22 RX ADMIN — SCOPALAMINE 1 PATCH: 1 PATCH, EXTENDED RELEASE TRANSDERMAL at 01:00

## 2022-02-22 RX ADMIN — PANTOPRAZOLE SODIUM 40 MILLIGRAM(S): 20 TABLET, DELAYED RELEASE ORAL at 06:35

## 2022-02-22 RX ADMIN — SCOPALAMINE 1 PATCH: 1 PATCH, EXTENDED RELEASE TRANSDERMAL at 00:12

## 2022-02-22 RX ADMIN — CEFEPIME 100 MILLIGRAM(S): 1 INJECTION, POWDER, FOR SOLUTION INTRAMUSCULAR; INTRAVENOUS at 09:42

## 2022-02-22 RX ADMIN — ALBUTEROL 2 PUFF(S): 90 AEROSOL, METERED ORAL at 11:36

## 2022-02-22 NOTE — PROGRESS NOTE ADULT - SUBJECTIVE AND OBJECTIVE BOX
SANDRA ROCHE  MRN-[id number]    Follow Up:  bacteremia    Interval History: the pt was seen and examined earlier, possible hospice referral , lying in bed with NRB off his face, sats drop, he denies pain but says he doesnt feel well      PAST MEDICAL & SURGICAL HISTORY:  HTN (hypertension)    CHF (congestive heart failure)    Prostate CA    History of tonsillectomy        ROS:    [x ] Unobtainable because: pt is somewhat confused but said doesnt feel great, didnt elaborate  [ ] All other systems negative      Allergies  No Known Allergies        ANTIMICROBIALS:    cefepime   IVPB 2000 every 24 hours  cefepime   IVPB        MEDICATIONS  (STANDING):  ALBUTerol    90 MICROgram(s) HFA Inhaler 2 every 6 hours  atorvastatin 20 at bedtime  bicalutamide 50 daily  enoxaparin Injectable 50 daily  pantoprazole   Suspension 40 daily  scopolamine 1 mG/72 Hr(s) Patch 1 every 72 hours      PRN      Physical Exam:  Vital Signs Last 24 Hrs  T(F): 97.4 (02-22-22 @ 16:37), Max: 98 (02-21-22 @ 23:55)  HR: 72 (02-22-22 @ 16:37)  BP: 113/68 (02-22-22 @ 16:37)  RR: 19 (02-22-22 @ 11:05)  SpO2: 94% (02-22-22 @ 16:37) (94% - 99%)  Wt(kg): --      Physical Exam:  Constitutional: chronically ill appearing  no distress, cachectic   HEAD/EYES: anicteric, no conjunctival injection  ENT:  supple,, dry mouth, no teeth   Cardiovascular:   normal S1, S2, no murmur, no edema  Respiratory:  coarse breath sounds bilaterally with diminished breath sounds on the right , no wheezes, no rales  GI:  soft, non-tender, normal bowel sounds  Musculoskeletal:  no synovitis,  right hallux dry gangrene   Neurologic: awake and alert, able to say his name, follows simple commands   Skin:  no rash, no erythema, no phlebitis  Heme/Onc: no cervical lymphadenopathy   Psychiatric:  calm behavior     WBC Count: 8.85 K/uL (02-22 @ 08:46)  WBC Count: 9.55 K/uL (02-21 @ 11:38)  WBC Count: 10.42 K/uL (02-20 @ 08:11)  WBC Count: 14.92 K/uL (02-19 @ 07:16)  WBC Count: 7.21 K/uL (02-18 @ 07:35)  WBC Count: 8.69 K/uL (02-17 @ 08:31)  WBC Count: 8.90 K/uL (02-16 @ 10:26)                            12.2   8.85  )-----------( 219      ( 22 Feb 2022 08:46 )             38.8       02-22    151<H>  |  118<H>  |  16  ----------------------------<  99  3.4<L>   |  26  |  1.07    Ca    8.9      22 Feb 2022 08:46        Creatinine Trend: 1.07<--, 1.15<--, 1.40<--, 1.53<--, 1.49<--, 0.94<--    Lactate, Blood: 3.5 mmol/L (02-22-22 @ 08:46)    MICROBIOLOGY:      .Blood Blood-Peripheral  02-17-22   No growth to date.  --  --      .Blood Blood-Peripheral  02-16-22   No growth to date.  --  --      .Blood Blood  02-14-22   Growth in aerobic and anaerobic bottles: Staphylococcus aureus  ***Blood Panel PCR results on this specimen are available  approximately 3 hours after the Gram stain result.***  Gram stain, PCR, and/or culture results may not always  correspond dueto difference in methodologies.  ************************************************************  This PCR assay was performed by multiplex PCR. This  Assay tests for 66 bacterial and resistance gene targets.  Please refer to the [email or web address] test directory  at [email or web address] for details.  --  Blood Culture PCR  Staphylococcus aureus        D-Dimer Assay, Quantitative: 6004 (02-13)      SARS-CoV-2 Result: NotDetec (02-13-22 @ 19:15)      RADIOLOGY:  < from: CT Chest No Cont (02.19.22 @ 10:21) >  IMPRESSION:  Increasing right pleural effusion with new complete right middle and   lower lobe atelectasis and increasing opacification of the right right   upper lobe.  Cannot exclude underlying pneumonia.    New left upper lobe pneumonia.    Right upper and middle lobe bronchi not visualize, either occluded,   compressed or postoperative.    Anasarca.    Nonobstructing right renal calculi.    < end of copied text >    < from: TTE Echo Complete w/o Contrast w/ Doppler (02.18.22 @ 10:30) >  Summary:   1. Left ventricular ejection fraction, by visual estimation, is 60 to   65%.   2. Technically difficult study.   3. Normal global left ventricular systolic function.   4. Normal left ventricular internal cavity size.   5. Spectral Doppler shows impaired relaxation pattern of left   ventricular myocardial filling (Grade I diastolic dysfunction).   6. Normal right ventricular size and function.   7. Normal left atrial size.   8. Normal right atrial size.   9. There is no evidence of pericardial effusion.  10. Mild mitral valve regurgitation.  11. Moderate mitral annular calcification.  12. Moderate thickening and calcification of the anterior and posterior   mitral valve leaflets.  13. Mild aortic regurgitation.  14. Mild aortic valve stenosis.  15. Estimated pulmonary artery systolic pressure is 49.2 mmHg assuming a   right atrial pressure of 5 mmHg,which is consistent with mild pulmonary   hypertension.  16. C/o prior echo of 2-12-18, mild AS and grade 1 diastolic dysfunction   now present.  17. Peak transaortic gradient equals 9.4 mmHg, mean transaortic gradient   equals 3.9 mmHg, the calculated aortic valve area equals 1.68 cm² by the   continuity equation consistent with mild aortic stenosis.  18. There is mild aortic root calcification.    < end of copied text >

## 2022-02-22 NOTE — PROGRESS NOTE ADULT - SUBJECTIVE AND OBJECTIVE BOX
INTERVAL HPI/OVERNIGHT EVENTS:  89y  Vital Signs Last 24 Hrs  T(C): 36.4 (22 Feb 2022 04:53), Max: 36.8 (21 Feb 2022 10:48)  T(F): 97.6 (22 Feb 2022 04:53), Max: 98.2 (21 Feb 2022 10:48)  HR: 78 (22 Feb 2022 07:02) (71 - 99)  BP: 122/80 (22 Feb 2022 04:53) (113/72 - 131/88)  BP(mean): --  RR: 18 (22 Feb 2022 04:53) (18 - 20)  SpO2: 99% (21 Feb 2022 23:55) (91% - 99%)  I&O's Summary    21 Feb 2022 07:01  -  22 Feb 2022 07:00  --------------------------------------------------------  IN: 600 mL / OUT: 0 mL / NET: 600 mL      MEDICATIONS  (STANDING):  ALBUTerol    90 MICROgram(s) HFA Inhaler 2 Puff(s) Inhalation every 6 hours  atorvastatin 20 milliGRAM(s) Oral at bedtime  bicalutamide 50 milliGRAM(s) Oral daily  cefepime   IVPB 2000 milliGRAM(s) IV Intermittent every 24 hours  cefepime   IVPB      dextrose 5% + lactated ringers. 1000 milliLiter(s) (50 mL/Hr) IV Continuous <Continuous>  enoxaparin Injectable 50 milliGRAM(s) SubCutaneous daily  pantoprazole   Suspension 40 milliGRAM(s) Oral daily  scopolamine 1 mG/72 Hr(s) Patch 1 Patch Transdermal every 72 hours    MEDICATIONS  (PRN):    LABS:    trop                        12.2   8.85  )-----------( 219      ( 22 Feb 2022 08:46 )             38.8     02-22    151<H>  |  118<H>  |  16  ----------------------------<  99  3.4<L>   |  26  |  1.07    Ca    8.9      22 Feb 2022 08:46          CAPILLARY BLOOD GLUCOSE        A & P:        Care Discussed with Consultants/Other Providers [ x] YES  [ ] NO     INTERVAL HPI/OVERNIGHT EVENTS: Pt seen and examined at bedside.    89y  Vital Signs Last 24 Hrs  T(C): 36.4 (22 Feb 2022 04:53), Max: 36.8 (21 Feb 2022 10:48)  T(F): 97.6 (22 Feb 2022 04:53), Max: 98.2 (21 Feb 2022 10:48)  HR: 78 (22 Feb 2022 07:02) (71 - 99)  BP: 122/80 (22 Feb 2022 04:53) (113/72 - 131/88)  BP(mean): --  RR: 18 (22 Feb 2022 04:53) (18 - 20)  SpO2: 99% (21 Feb 2022 23:55) (91% - 99%)  I&O's Summary    21 Feb 2022 07:01  -  22 Feb 2022 07:00  --------------------------------------------------------  IN: 600 mL / OUT: 0 mL / NET: 600 mL      MEDICATIONS  (STANDING):  ALBUTerol    90 MICROgram(s) HFA Inhaler 2 Puff(s) Inhalation every 6 hours  atorvastatin 20 milliGRAM(s) Oral at bedtime  bicalutamide 50 milliGRAM(s) Oral daily  cefepime   IVPB 2000 milliGRAM(s) IV Intermittent every 24 hours  cefepime   IVPB      dextrose 5% + lactated ringers. 1000 milliLiter(s) (50 mL/Hr) IV Continuous <Continuous>  enoxaparin Injectable 50 milliGRAM(s) SubCutaneous daily  pantoprazole   Suspension 40 milliGRAM(s) Oral daily  scopolamine 1 mG/72 Hr(s) Patch 1 Patch Transdermal every 72 hours    MEDICATIONS  (PRN):    LABS:    trop                        12.2   8.85  )-----------( 219      ( 22 Feb 2022 08:46 )             38.8     02-22    151<H>  |  118<H>  |  16  ----------------------------<  99  3.4<L>   |  26  |  1.07    Ca    8.9      22 Feb 2022 08:46          CAPILLARY BLOOD GLUCOSE      RADIOLOGY & ADDITIONAL TESTS:    Imaging Personally Reviewed:  [ ] YES  [ ] NO    Consultant(s) Notes Reviewed:  [x ] YES  [ ] NO    PHYSICAL EXAM:  GENERAL: NAD, on NRB  NERVOUS SYSTEM:  lethargic  CHEST/LUNG: Diminished BS b/l.  HEART: Regular rate and rhythm; No murmurs, rubs, or gallops  ABDOMEN: Soft, Nontender, Nondistended; Bowel sounds present  EXTREMITIES:  No clubbing, cyanosis, or edema        A & P:        Care Discussed with Consultants/Other Providers [ x] YES  [ ] NO

## 2022-02-22 NOTE — PROGRESS NOTE ADULT - ASSESSMENT
Patient is a 89M with a PMH of CHF?, prostate Ca who presents to the ED for dyspnea.  Patient currently AAOx1, unable to provide history.  Patient reportedly noted to have decreased appetite and wheezing by his HHA today who called EMS.  No current complaints.  SpO2 currently 96 on 4L via NC.  Vitals stable, labs show mild leukocytosis.    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   Even if foot is not the source, he needs good wound care and podiatric treatment     2/16: no fever, on NRB, + bacteremia, repeat BCs are pending, TTE pending, abx changed to cefazolin according to the sensitivity  2/17: remains afebrile, on NRB, lactate is better 2.4, R foot xray with no findings, repeat BCs pending, cefazolin continued, TTE and CT a/p pending.   Attending addendum:  agree with above   continue cefazolin and follow cultures   Transthoracic Echocardiogram to rule out endocarditis   will eventually need midline    2/18: no fever, on NRB, no WBC, Cr and LFTs ok, repeat BCs with no growth, TTE and CT a/p pending  2/19: rising lactate again, IVF, new pneumonia and right sided opacification, has been changed to nasal cannula and tolerating so far, requested that cefazolin be changed to vancomycin and cefepime pending further workup and improvement   2/22: possible hospice referal, otherwise needs chest tube vs bronchoscopy but frail and elderly so not the best candidate, he would need antibiotics until 3/16      MSSA bacteremia   high serum lactate   right sided effusion with atelectasis  CHF   functional quadriplegia     Plan:   continue cefepime  mrsa negative and thus stopped vancomycin   follow culture data   trend lactate   trend wbc   caution with fluids    appreciate thoracic surgery-no bronch or surgery given high risk   frequent turns, offloading, and nutrition optimization to avoid bedsores  place midline    D/W Dr. Sarah Rey, DO  Infectious Disease Attending  Reachable via [employer] Teams or ID office: [phone]  After 5pm/weekends please call [phone] for all inquiries and new consults

## 2022-02-22 NOTE — PROGRESS NOTE ADULT - ASSESSMENT
a 89M with a PMH of CHF?, prostate Ca who presents to the ED for dyspnea.  Patient currently AAOx1, unable to provide history.  Patient reportedly noted to have decreased appetite and wheezing by his HHA today who called EMS.  No current complaints.  SpO2 currently 96 on 4L via NC.  Vitals stable, labs show mild leukocytosis.  Will admit to tele.      Metabolic encephalopathy.    Hx of failure to thrive   - Unclear baseline mental status.  Patient presents with hypothermia,   As per son- was congested/ not eating much   Usually pt is bedbound on a wheelchair for most of the day-mainly due to general weakness for over more than 6months, has dementia but ao   Hx of shin wound- and was told not to move around too much for the wound to heal     passed speech and swallow- on pureed diet, not eating much today- will also add D5   Pt's son refused NGT at this time.    Bacteremia  one blood culture from 2/14 growing staph aureus  Repeat blood cultures negative  CXR performed on 2/18- showing Complete opacification of RIGHT hemithorax.   -reviewed CT of chest/abd/pelvis   -antibiotics changed to Vanc/Cefepime- due to severe PNA     -As per Podiatry- less likely source of infection being the right hallux, awaiting official xray reports  -ECHO- no signs of endocarditis, EF-60-65%, mitral annual calcification  -ID following      Acute pulmonary embolus.   RLL Pneumonia.   Mucous plug  Underlying COPD  CTA-Pulmonary embolism in the segmental right lower lobe  switched heparin drip to lovenox (theraputic dose) since pt agitated and not easy stick  Will need to switch to a DOAC but will need to estimate risks/vs benefits given hx of gastric ulcers and hx of bleeding in past  -continue antibiotics  -continue respiratory suctioning, chest vest  -will also put IR consult for possible need of thoracentesis chest tube placement---SPOKE WITH IR- Jose Fallon---CT shows mostly atelectasis secondary to obstructed right mainstem bronchus. IF any invasive treatment-pt could benefit from bronchoscopy--risks and benefits of procedure given pt deconditioned state. Spoke with Dr. Elma Huang--CT Surgery-will evaluate pt for possible Bronchoscopy   -Pulm following pt  -Pallative on board as well      Troponin level elevated.   - Troponin elevated-most likely demand ischemia  No current chest pain.   As per Cardiology - "no concomitant evidence of acute ischemia clinically or on ECG - likely type II MI in setting of CAP/PE."    Chronic CHF.   - spironolactone (on hold)   Holding Lasix and aldactone now in setting of hypernatremia and dehydration   -Received two doses of IV 40mg Lasix due to congestion on 2/18  ASA relatively contraindicated due to recurrent GI bleed in the past  Bbl relatively contraindicated due to emphysema, smoking history  will restart on Statin   ECHO reviewed- EF preserved     AMNA (acute kidney injury).   -monitor    Hypernatremia.   -resolved  Hypokalemia  -continue to supplement and monitor     Prostate cancer.   ·  Plan: bicalutamide.    Right hallux and heal wounds   -continue wound care  -follow up with Podiatry   -xray no acute findings     dvtppx- Lovenox therapeutic dose         Stephane Jason- [phone]---Son is going away on a business  trip today- won't be able to come to hospital daily now---please give an update on the phone-would like to hear from the doctor   [facility] signed- pt is DNR/DNI  Spoke with Son who wants his father to be comfortable, Hospice care eval.

## 2022-02-22 NOTE — PROGRESS NOTE ADULT - SUBJECTIVE AND OBJECTIVE BOX
SUBJECTIVE AND OBJECTIVE: Patient lethargic on nasal cannula  INTERVAL HPI/OVERNIGHT EVENTS:    DNR on chart: yes  Allergies    No Known Allergies    Intolerances    MEDICATIONS  (STANDING):  ALBUTerol    90 MICROgram(s) HFA Inhaler 2 Puff(s) Inhalation every 6 hours  atorvastatin 20 milliGRAM(s) Oral at bedtime  bicalutamide 50 milliGRAM(s) Oral daily  cefepime   IVPB 2000 milliGRAM(s) IV Intermittent every 24 hours  cefepime   IVPB      dextrose 5%. 1000 milliLiter(s) (75 mL/Hr) IV Continuous <Continuous>  enoxaparin Injectable 50 milliGRAM(s) SubCutaneous daily  pantoprazole   Suspension 40 milliGRAM(s) Oral daily  scopolamine 1 mG/72 Hr(s) Patch 1 Patch Transdermal every 72 hours    MEDICATIONS  (PRN):      ITEMS UNCHECKED ARE NOT PRESENT    PRESENT SYMPTOMS: [x ]Unable to obtain due to poor mentation   Source if other than patient:  [ ]Family   [ ]Team     Pain:  [ ]yes [ ]no  QOL impact -   Location -                    Aggravating factors -  Quality -  Radiation -  Timing-  Severity (0-10 scale):  Minimal acceptable level (0-10 scale):     Dyspnea:                           [ ]Mild [ ]Moderate [ ]Severe  Anxiety:                             [ ]Mild [ ]Moderate [ ]Severe  Fatigue:                             [ ]Mild [ ]Moderate [ ]Severe  Nausea:                             [ ]Mild [ ]Moderate [ ]Severe  Loss of appetite:              [ ]Mild [ ]Moderate [ ]Severe  Constipation:                    [ ]Mild [ ]Moderate [ ]Severe    PAIN AD Score:	2  [email or web address] (Ctrl + left click to view)    Other Symptoms:  [ ]All other review of systems negative     Palliative Performance Status Version 2:        10-20 %      http://npcrc.org/files/news/palliative_performance_scale_ppsv2.pdf  PHYSICAL EXAM:  Vital Signs Last 24 Hrs  T(C): 36.3 (22 Feb 2022 11:05), Max: 36.7 (21 Feb 2022 23:55)  T(F): 97.3 (22 Feb 2022 11:05), Max: 98 (21 Feb 2022 23:55)  HR: 80 (22 Feb 2022 11:05) (71 - 99)  BP: 111/75 (22 Feb 2022 11:05) (111/75 - 131/88)  BP(mean): --  RR: 19 (22 Feb 2022 11:05) (18 - 20)  SpO2: 98% (22 Feb 2022 11:05) (91% - 99%) I&O's Summary    21 Feb 2022 07:01  -  22 Feb 2022 07:00  --------------------------------------------------------  IN: 600 mL / OUT: 0 mL / NET: 600 mL       GENERAL:  [ ]Alert  [ ]Oriented x   [x ]Lethargic  [ ]Cachexia  [ ]Unarousable  [ ]Verbal  [x ]Non-Verbal moans at times  Behavioral:   [ ]Anxiety  [ ]Delirium [ ]Agitation [ ]Other  HEENT:  [ ]Normal   [ x]Dry mouth   [ ]ET Tube/Trach  [ ]Oral lesions  PULMONARY:   [ ]Clear [ ]Tachypnea  [ ]Audible excessive secretions   [ ]Rhonchi        [ ]Right [ ]Left [ ]Bilateral  [ ]Crackles        [ ]Right [ ]Left [ ]Bilateral  [ ]Wheezing     [ ]Right [ ]Left [ ]Bilateral  [x ]Diminished BS [ ] Right [ ]Left [x ]Bilateral   CARDIOVASCULAR:    [x ]Regular [ ]Irregular [ ]Tachy  [ ]Rudolph [ ]Murmur [ ]Other  GASTROINTESTINAL:  [x ]Soft  [ ]Distended   [ ]+BS  [ ]Non tender [ ]Tender  [ ]PEG [ ]OGT/ NGT   Last BM:  2/21/22  GENITOURINARY:  [ ]Normal [x ]Incontinent   [ ]Oliguria/Anuria   [ ]Lutz  MUSCULOSKELETAL:   [ ]Normal   [ ]Weakness  [x ]Bed/Wheelchair bound [ ]Edema  NEUROLOGIC:   [ ]No focal deficits  [x ] Cognitive impairment  [ ] Dysphagia [ ]Dysarthria [ ] Paresis [ ]Other   SKIN:   [ ]Normal  [ ]Rash   [ x]Pressure ulcer(s)  right great toe unstageable, right heel unstageable, sacrum healed[ ]y [ ]n present on admission    CRITICAL CARE:  [ ]Shock Present  [ ]Septic [ ]Cardiogenic [ ]Neurologic [ ]Hypovolemic  [ ]Vasopressors [ ]Inotropes  [ ]Respiratory failure present [ ]Mechanical Ventilation [ ]Non-invasive ventilatory support [ ]High-Flow  [ ]Acute  [ ]Chronic [ ]Hypoxic  [ ]Hypercarbic [ ]Other  [ ]Other organ failure     LABS:                        12.2   8.85  )-----------( 219      ( 22 Feb 2022 08:46 )             38.8   02-22    151<H>  |  118<H>  |  16  ----------------------------<  99  3.4<L>   |  26  |  1.07    Ca    8.9      22 Feb 2022 08:46    RADIOLOGY & ADDITIONAL STUDIES:   < from: CT Abdomen and Pelvis No Cont (02.19.22 @ 10:21) >  ACC: [id number] EXAM:  CT ABDOMEN AND PELVIS                        ACC: [id number] EXAM:  CT CHEST                        PROCEDURE DATE:  02/19/2022    INTERPRETATION:  CLINICAL INFORMATION: 89 years  Male with sob.  COMPARISON: PE study 2/13/2022 and CT abdomen and pelvis 10/24/2009  CONTRAST/COMPLICATIONS:  IV Contrast: None  Oral Contrast: None  Complications: None  PROCEDURE:  CT of the Chest, Abdomen and Pelvis was performed.  Sagittal and coronal reformats were performed.  LIMITATIONS: Evaluation of the solid organs, vascular structures and GI   tract is limited without oral and IV contrast. Motion artifact.  FINDINGS:  CHEST:  LUNGS AND LARGE AIRWAYS: Right upper lobe and middle lobe bronchi not   visualized. Worsening consolidation and atelectasis of the right lung now   with complete right middle and lower lobe atelectasis and minimal   aeration of the right upper lobe. Mild left lower lobe compressive   atelectasis unchanged.  Left upper lobe consolidation secondary to pneumonia.  PLEURA: Increasing right pleural effusion. Small left pleural effusion   unchanged.  VESSELS: Coronary atherosclerosis.  HEART: Heart size is normal. No pericardial effusion.  MEDIASTINUM AND HAIR: Within normal limits. Limited evaluation without IV   contrast.  CHEST WALL AND LOWER NECK: Within normal limits.  ABDOMEN AND PELVIS:  LIVER: Within normal limits.  BILE DUCTS: Normal caliber.  GALLBLADDER: Within normal limits.  SPLEEN: Within normal limits.  PANCREAS: Within normal limits.  ADRENALS: Within normal limits.  KIDNEYS/URETERS: Nonobstructing right intrarenal calculi measuring up to   7 mm. Right upper pole hypodensity too small to characterize. No   hydroureteronephrosis bilaterally.  BLADDER: Layering stonesversus milk of calcium in the bladder.  REPRODUCTIVE ORGANS: Atrophic prostate versus prostatectomy.  BOWEL: No bowel obstruction. Appendix is not visualized and cannot be   assessed.  PERITONEUM: No ascites.  VESSELS: Markedly tortuous atherosclerotic aorta.  RETROPERITONEUM/LYMPH NODES: No lymphadenopathy.  ABDOMINAL WALL: Anasarca.  BONES: Severe scoliosis.  IMPRESSION:  Increasing right pleural effusion with new complete right middle and   lower lobe atelectasis and increasing opacification of the right right   upper lobe.  Cannot exclude underlying pneumonia.  New left upper lobe pneumonia.  Right upper and middle lobe bronchi not visualize, either occluded,   compressed or postoperative.  Anasarca.  Nonobstructing right renal calculi.  --- End of Report ---  < end of copied text >    Protein Calorie Malnutrition Present: [ ]mild [ ]moderate [ x]severe [ ]underweight [ ]morbid obesity  https://www.andeal.org/vault/2440/web/files/ONC/Table_Clinical%20Characteristics%20to%20Document%20Malnutrition-White%20JV%20et%20al%057250.pdf    Height (cm): 167.6 (02-13-22 @ 17:02), 167.6 (09-27-21 @ 11:29), 167.6 (04-12-21 @ 12:23)  Weight (kg): 44.4 (02-14-22 @ 01:14), 63.5 (09-27-21 @ 11:29), 40.8 (04-12-21 @ 12:23)  BMI (kg/m2): 15.8 (02-14-22 @ 01:14), 22.6 (02-13-22 @ 17:02), 22.6 (09-27-21 @ 11:29)    [x ]PPSV2 < or = 30%  [ ]significant weight loss [x ]poor nutritional intake [ ]anasarca    [ ]Artificial Nutrition    REFERRALS:   [ ]Chaplaincy  [ ]Hospice  [ ]Child Life  [ ]Social Work  [ ]Case management [ ]Holistic Therapy     Goals of Care Document:

## 2022-02-22 NOTE — PROGRESS NOTE ADULT - SUBJECTIVE AND OBJECTIVE BOX
INTERVAL HPI:   89M with HTN, CHF?, Prostate Ca who presents to the ED for dyspnea, wheezing,  wet cough, and decreased appetite for two weeks.  Found to have PE, Pneumonia, Dehydration, hypernatremia and Renal insuffiencey. Blood cultures are growing staph Aureus.  Not able to provide more details.  Started on IV heparin, IV fluids, NRB mask and antibiotics.    OVERNIGHT EVENTS:  On NRB mask.    Vital Signs Last 24 Hrs  T(C): 36.3 (22 Feb 2022 16:37), Max: 36.7 (21 Feb 2022 23:55)  T(F): 97.4 (22 Feb 2022 16:37), Max: 98 (21 Feb 2022 23:55)  HR: 72 (22 Feb 2022 16:37) (72 - 99)  BP: 113/68 (22 Feb 2022 16:37) (111/75 - 122/80)  BP(mean): --  RR: 19 (22 Feb 2022 11:05) (18 - 19)  SpO2: 94% (22 Feb 2022 16:37) (94% - 99%)    PHYSICAL EXAM:  GEN:         responsive and comfortable.  HEENT:    Normal.    RESP:       decreased air entry.  CVS:          Regular rate and rhythm.     MEDICATIONS  (STANDING):  ALBUTerol    90 MICROgram(s) HFA Inhaler 2 Puff(s) Inhalation every 6 hours  atorvastatin 20 milliGRAM(s) Oral at bedtime  bicalutamide 50 milliGRAM(s) Oral daily  cefepime   IVPB 2000 milliGRAM(s) IV Intermittent every 24 hours  cefepime   IVPB      dextrose 5%. 1000 milliLiter(s) (75 mL/Hr) IV Continuous <Continuous>  enoxaparin Injectable 50 milliGRAM(s) SubCutaneous daily  pantoprazole   Suspension 40 milliGRAM(s) Oral daily  scopolamine 1 mG/72 Hr(s) Patch 1 Patch Transdermal every 72 hours    LABS:                        12.2   8.85  )-----------( 219      ( 22 Feb 2022 08:46 )             38.8     02-22    149<H>  |  118<H>  |  17  ----------------------------<  101<H>  3.4<L>   |  26  |  1.13    Ca    8.1<L>      22 Feb 2022 18:37    ASSESSMENT AND PLAN:  ·	Acute PE.  ·	RLL pneumonia.  ·	Staph Aureus bacteremia.  ·	Dehydration.  ·	Hypernatremia.  ·	Renal Insuffiencey  ·	Prostate CA.  ·	HTN.  ·	RUL+ RML atelectasis.  ·	Right pleural effusion.    SPO2 94% on NRB mask  On chest vest for airway clearance  Antibiotics per ID.  Albuterol MDI

## 2022-02-23 DIAGNOSIS — R19.5 OTHER FECAL ABNORMALITIES: ICD-10-CM

## 2022-02-23 LAB
ANION GAP SERPL CALC-SCNC: 5 MMOL/L — SIGNIFICANT CHANGE UP (ref 5–17)
BUN SERPL-MCNC: 21 MG/DL — SIGNIFICANT CHANGE UP (ref 7–23)
CALCIUM SERPL-MCNC: 8 MG/DL — LOW (ref 8.5–10.1)
CHLORIDE SERPL-SCNC: 118 MMOL/L — HIGH (ref 96–108)
CO2 SERPL-SCNC: 24 MMOL/L — SIGNIFICANT CHANGE UP (ref 22–31)
CREAT SERPL-MCNC: 0.98 MG/DL — SIGNIFICANT CHANGE UP (ref 0.5–1.3)
GLUCOSE SERPL-MCNC: 104 MG/DL — HIGH (ref 70–99)
HCT VFR BLD CALC: 30.5 % — LOW (ref 39–50)
HCT VFR BLD CALC: 31.2 % — LOW (ref 39–50)
HCT VFR BLD CALC: 33.7 % — LOW (ref 39–50)
HGB BLD-MCNC: 10.4 G/DL — LOW (ref 13–17)
HGB BLD-MCNC: 9.4 G/DL — LOW (ref 13–17)
HGB BLD-MCNC: 9.6 G/DL — LOW (ref 13–17)
MCHC RBC-ENTMCNC: 27.5 PG — SIGNIFICANT CHANGE UP (ref 27–34)
MCHC RBC-ENTMCNC: 27.6 PG — SIGNIFICANT CHANGE UP (ref 27–34)
MCHC RBC-ENTMCNC: 27.8 PG — SIGNIFICANT CHANGE UP (ref 27–34)
MCHC RBC-ENTMCNC: 30.8 G/DL — LOW (ref 32–36)
MCHC RBC-ENTMCNC: 30.8 G/DL — LOW (ref 32–36)
MCHC RBC-ENTMCNC: 30.9 G/DL — LOW (ref 32–36)
MCV RBC AUTO: 89.2 FL — SIGNIFICANT CHANGE UP (ref 80–100)
MCV RBC AUTO: 89.7 FL — SIGNIFICANT CHANGE UP (ref 80–100)
MCV RBC AUTO: 90.1 FL — SIGNIFICANT CHANGE UP (ref 80–100)
NRBC # BLD: 0 /100 WBCS — SIGNIFICANT CHANGE UP (ref 0–0)
OB PNL STL: POSITIVE
PLATELET # BLD AUTO: 167 K/UL — SIGNIFICANT CHANGE UP (ref 150–400)
PLATELET # BLD AUTO: 183 K/UL — SIGNIFICANT CHANGE UP (ref 150–400)
PLATELET # BLD AUTO: 184 K/UL — SIGNIFICANT CHANGE UP (ref 150–400)
POTASSIUM SERPL-MCNC: 3.5 MMOL/L — SIGNIFICANT CHANGE UP (ref 3.5–5.3)
POTASSIUM SERPL-SCNC: 3.5 MMOL/L — SIGNIFICANT CHANGE UP (ref 3.5–5.3)
RBC # BLD: 3.42 M/UL — LOW (ref 4.2–5.8)
RBC # BLD: 3.48 M/UL — LOW (ref 4.2–5.8)
RBC # BLD: 3.74 M/UL — LOW (ref 4.2–5.8)
RBC # FLD: 16.5 % — HIGH (ref 10.3–14.5)
RBC # FLD: 16.5 % — HIGH (ref 10.3–14.5)
RBC # FLD: 16.7 % — HIGH (ref 10.3–14.5)
SODIUM SERPL-SCNC: 147 MMOL/L — HIGH (ref 135–145)
WBC # BLD: 10.95 K/UL — HIGH (ref 3.8–10.5)
WBC # BLD: 10.96 K/UL — HIGH (ref 3.8–10.5)
WBC # BLD: 11.94 K/UL — HIGH (ref 3.8–10.5)
WBC # FLD AUTO: 10.95 K/UL — HIGH (ref 3.8–10.5)
WBC # FLD AUTO: 10.96 K/UL — HIGH (ref 3.8–10.5)
WBC # FLD AUTO: 11.94 K/UL — HIGH (ref 3.8–10.5)

## 2022-02-23 PROCEDURE — 99233 SBSQ HOSP IP/OBS HIGH 50: CPT

## 2022-02-23 PROCEDURE — 99232 SBSQ HOSP IP/OBS MODERATE 35: CPT

## 2022-02-23 RX ORDER — PANTOPRAZOLE SODIUM 20 MG/1
8 TABLET, DELAYED RELEASE ORAL
Qty: 80 | Refills: 0 | Status: DISCONTINUED | OUTPATIENT
Start: 2022-02-23 | End: 2022-02-25

## 2022-02-23 RX ORDER — MORPHINE SULFATE 50 MG/1
1 CAPSULE, EXTENDED RELEASE ORAL
Refills: 0 | Status: DISCONTINUED | OUTPATIENT
Start: 2022-02-23 | End: 2022-02-28

## 2022-02-23 RX ORDER — MORPHINE SULFATE 50 MG/1
0.5 CAPSULE, EXTENDED RELEASE ORAL EVERY 6 HOURS
Refills: 0 | Status: DISCONTINUED | OUTPATIENT
Start: 2022-02-23 | End: 2022-02-28

## 2022-02-23 RX ADMIN — SCOPALAMINE 1 PATCH: 1 PATCH, EXTENDED RELEASE TRANSDERMAL at 00:05

## 2022-02-23 RX ADMIN — ALBUTEROL 2 PUFF(S): 90 AEROSOL, METERED ORAL at 05:02

## 2022-02-23 RX ADMIN — SCOPALAMINE 1 PATCH: 1 PATCH, EXTENDED RELEASE TRANSDERMAL at 19:32

## 2022-02-23 RX ADMIN — MORPHINE SULFATE 0.5 MILLIGRAM(S): 50 CAPSULE, EXTENDED RELEASE ORAL at 18:08

## 2022-02-23 RX ADMIN — SODIUM CHLORIDE 75 MILLILITER(S): 9 INJECTION, SOLUTION INTRAVENOUS at 01:01

## 2022-02-23 RX ADMIN — CEFEPIME 100 MILLIGRAM(S): 1 INJECTION, POWDER, FOR SOLUTION INTRAMUSCULAR; INTRAVENOUS at 12:18

## 2022-02-23 RX ADMIN — PANTOPRAZOLE SODIUM 10 MG/HR: 20 TABLET, DELAYED RELEASE ORAL at 20:40

## 2022-02-23 RX ADMIN — ALBUTEROL 2 PUFF(S): 90 AEROSOL, METERED ORAL at 18:08

## 2022-02-23 RX ADMIN — ALBUTEROL 2 PUFF(S): 90 AEROSOL, METERED ORAL at 00:06

## 2022-02-23 RX ADMIN — PANTOPRAZOLE SODIUM 10 MG/HR: 20 TABLET, DELAYED RELEASE ORAL at 13:03

## 2022-02-23 NOTE — PROGRESS NOTE ADULT - SUBJECTIVE AND OBJECTIVE BOX
SANDRA ROCHE  MRN-[id number]    Follow Up:  PNA, MSSA bacteremia    Interval History: the pt was seen and examined earlier, awake and alert, + fatigued, on NRB. The pt is able to state his name and states that he is doing "ok". The pt is afebrile, WBC 10.95, Cr ok.     PAST MEDICAL & SURGICAL HISTORY:  HTN (hypertension)    CHF (congestive heart failure)    Prostate CA    History of tonsillectomy        ROS:  limited, pt denies any pain, denies any N/V  [ ] Unobtainable because:  [ ] All other systems negative    Constitutional: no fever, no chills  Head: no trauma  Eyes: no vision changes, no eye pain  ENT:  no sore throat, no rhinorrhea  Cardiovascular:  no chest pain, no palpitation  Respiratory:  no SOB, no cough  GI:  no abd pain, no vomiting, no diarrhea  urinary: no dysuria, no hematuria, no flank pain  musculoskeletal:  no joint pain, no joint swelling  skin:  no rash  neurology:  no headache, no seizure, no change in mental status  psych: no anxiety, no depression         Allergies  No Known Allergies        ANTIMICROBIALS:  cefepime   IVPB 2000 every 24 hours  cefepime   IVPB        OTHER MEDS:  ALBUTerol    90 MICROgram(s) HFA Inhaler 2 Puff(s) Inhalation every 6 hours  atorvastatin 20 milliGRAM(s) Oral at bedtime  bicalutamide 50 milliGRAM(s) Oral daily  dextrose 5%. 1000 milliLiter(s) IV Continuous <Continuous>  enoxaparin Injectable 50 milliGRAM(s) SubCutaneous daily  morphine  - Injectable 0.5 milliGRAM(s) IV Push every 6 hours  morphine  - Injectable 1 milliGRAM(s) IV Push every 3 hours PRN  pantoprazole Infusion 8 mG/Hr IV Continuous <Continuous>  scopolamine 1 mG/72 Hr(s) Patch 1 Patch Transdermal every 72 hours      Vital Signs Last 24 Hrs  T(C): 36.3 (23 Feb 2022 10:42), Max: 36.3 (22 Feb 2022 16:37)  T(F): 97.4 (23 Feb 2022 10:42), Max: 97.4 (22 Feb 2022 16:37)  HR: 71 (23 Feb 2022 10:42) (71 - 83)  BP: 106/69 (23 Feb 2022 10:42) (106/69 - 117/70)  BP(mean): --  RR: 18 (23 Feb 2022 10:42) (18 - 18)  SpO2: 89% (23 Feb 2022 10:42) (89% - 95%)    Physical Exam:  Constitutional: chronically ill appearing  no distress, cachectic, on NRB  HEAD/EYES: anicteric, no conjunctival injection  ENT:  supple, dry mouth, no teeth   Cardiovascular:   normal S1, S2, no murmur, no edema  Respiratory:  diminished breath sounds bilaterally, no rhonchi, no wheezes, no rales  GI:  soft, non-tender, normal bowel sounds  Musculoskeletal:  no synovitis,  right hallux dry gangrene   Neurologic: awake and alert, able to say his name, follows simple commands   Skin:  no rash, no erythema, no phlebitis  Heme/Onc: no cervical lymphadenopathy   Psychiatric:  calm behavior     WBC Count: 10.96 K/uL (02-23 @ 16:19)  WBC Count: 11.94 K/uL (02-23 @ 11:59)  WBC Count: 10.95 K/uL (02-23 @ 08:24)  WBC Count: 8.85 K/uL (02-22 @ 08:46)  WBC Count: 9.55 K/uL (02-21 @ 11:38)  WBC Count: 10.42 K/uL (02-20 @ 08:11)  WBC Count: 14.92 K/uL (02-19 @ 07:16)                            10.4   10.96 )-----------( 183      ( 23 Feb 2022 16:19 )             33.7       02-23    147<H>  |  118<H>  |  21  ----------------------------<  104<H>  3.5   |  24  |  0.98    Ca    8.0<L>      23 Feb 2022 08:24    Creatinine Trend: 0.98<--, 1.13<--, 1.07<--, 1.15<--, 1.40<--, 1.53<--  Lactate, Blood: 3.5 mmol/L (02-22-22 @ 08:46)      MICROBIOLOGY:  v  .Blood Blood-Peripheral  02-17-22   No Growth Final  --  --      .Blood Blood-Peripheral  02-16-22   No Growth Final  --  --      .Blood Blood  02-14-22   Growth in aerobic and anaerobic bottles: Staphylococcus aureus  ***Blood Panel PCR results on this specimen are available  approximately 3 hours after the Gram stain result.***  Gram stain, PCR, and/or culture results may not always  correspond dueto difference in methodologies.  ************************************************************  This PCR assay was performed by multiplex PCR. This  Assay tests for 66 bacterial and resistance gene targets.  Please refer to the [facility] test directory  at [email or web address] for details.  --  Blood Culture PCR  Staphylococcus aureus        D-Dimer Assay, Quantitative: 6004 (02-13)    Procalcitonin, Serum: 0.57 (02-20-22 @ 10:43)    SARS-CoV-2 Result: NotDetec (02-20-22 @ 08:46)      RADIOLOGY:

## 2022-02-23 NOTE — PROGRESS NOTE ADULT - ASSESSMENT
a 89M with a PMH of CHF?, prostate Ca who presents to the ED for dyspnea.  Patient currently AAOx1, unable to provide history.  Patient reportedly noted to have decreased appetite and wheezing by his HHA today who called EMS.  No current complaints.  SpO2 currently 96 on 4L via NC.  Vitals stable, labs show mild leukocytosis.  Will admit to tele.      Metabolic encephalopathy.    Hx of failure to thrive   - Unclear baseline mental status.  Patient presents with hypothermia,   As per son- was congested/ not eating much   Usually pt is bedbound on a wheelchair for most of the day-mainly due to general weakness for over more than 6months, has dementia    passed speech and swallow- on pureed diet, not eating much today- will also add D5   Pt's son refused NGT at this time.    Bacteremia  one blood culture from 2/14 growing staph aureus  Repeat blood cultures negative  CXR performed on 2/18- showing Complete opacification of RIGHT hemithorax.   -reviewed CT of chest/abd/pelvis   -c/w cefepime    Acute pulmonary embolus.  RLL Pneumonia.   Mucous plug  Underlying COPD  CTA-Pulmonary embolism in the segmental right lower lobe  switched heparin drip to lovenox (theraputic dose) since pt agitated and not easy stick  Will need to switch to a DOAC but will need to estimate risks/vs benefits given hx of gastric ulcers and hx of bleeding in past  -continue antibiotics  -continue respiratory suctioning, chest vest  -appreciate thoracic surgery-no bronch or surgery given high risk    -Pulm following pt  -Pallative on board as well      Troponin level elevated.   - Troponin elevated-most likely demand ischemia  No current chest pain.   As per Cardiology - "no concomitant evidence of acute ischemia clinically or on ECG - likely type II MI in setting of CAP/PE."    Chronic CHF.   - spironolactone (on hold)   Holding Lasix and aldactone now in setting of hypernatremia and dehydration   -Received two doses of IV 40mg Lasix due to congestion on 2/18  ASA relatively contraindicated due to recurrent GI bleed in the past  Bbl relatively contraindicated due to emphysema, smoking history  will restart on Statin   ECHO reviewed- EF preserved     AMNA (acute kidney injury).   -monitor    Hypernatremia.   -resolved  Hypokalemia  -continue to supplement and monitor     Prostate cancer.   ·  Plan: bicalutamide.    Right hallux and heal wounds   -continue wound care  -follow up with Podiatry   -xray no acute findings     ? GIB  + ?melena  Hb 12-->9.  Monitor Hb  c/w protonix drip    dvtppx- hold Lovenox sec to GIB  SCD    Son- Eren- [phone]---Son is going away on a business  trip today- won't be able to come to hospital daily now  couldnt reach Eren, tried calling multiple times, Left a voicemail.  [facility]- [facility] signed- pt is DNR/DNI  Spoke with Son who wants his father to be comfortable, Hospice care eval Pending. a 89M with a PMH of CHF?, prostate Ca who presents to the ED for dyspnea.  Patient currently AAOx1, unable to provide history.  Patient reportedly noted to have decreased appetite and wheezing by his HHA today who called EMS.  No current complaints.  SpO2 currently 96 on 4L via NC.  Vitals stable, labs show mild leukocytosis.  Will admit to tele.      Metabolic encephalopathy.    Hx of failure to thrive   - Unclear baseline mental status.  Patient presents with hypothermia,   As per son- was congested/ not eating much   Usually pt is bedbound on a wheelchair for most of the day-mainly due to general weakness for over more than 6months, has dementia    passed speech and swallow- on pureed diet, not eating much today- will also add D5   Pt's son refused NGT at this time.    Bacteremia  one blood culture from 2/14 growing staph aureus  Repeat blood cultures negative  CXR performed on 2/18- showing Complete opacification of RIGHT hemithorax.   -reviewed CT of chest/abd/pelvis   -c/w cefepime    Acute pulmonary embolus.  RLL Pneumonia.   Mucous plug  Underlying COPD  CTA-Pulmonary embolism in the segmental right lower lobe  switched heparin drip to lovenox (theraputic dose) since pt agitated and not easy stick  Will need to switch to a DOAC but will need to estimate risks/vs benefits given hx of gastric ulcers and hx of bleeding in past  -continue antibiotics  -continue respiratory suctioning, chest vest  -appreciate thoracic surgery-no bronch or surgery given high risk    -Pulm following pt  -Pallative on board as well      Troponin level elevated.   - Troponin elevated-most likely demand ischemia  No current chest pain.   As per Cardiology - "no concomitant evidence of acute ischemia clinically or on ECG - likely type II MI in setting of CAP/PE."    Chronic CHF.   - spironolactone (on hold)   Holding Lasix and aldactone now in setting of hypernatremia and dehydration   -Received two doses of IV 40mg Lasix due to congestion on 2/18  ASA relatively contraindicated due to recurrent GI bleed in the past  Bbl relatively contraindicated due to emphysema, smoking history  will restart on Statin   ECHO reviewed- EF preserved     AMNA (acute kidney injury).   -monitor    Hypernatremia.   -resolved  Hypokalemia  -continue to supplement and monitor     Prostate cancer.   ·  Plan: bicalutamide.    Right hallux and heal wounds   -continue wound care  -follow up with Podiatry   -xray no acute findings     ? GIB  + ?melena  Hb 12-->9.  Monitor Hb  c/w protonix drip  He refused EGD.    dvtppx- hold Lovenox sec to GIB  SCD    Son- Eren- [phone]---updated the plan,   [facility]- [facility] signed- pt is DNR/DNI  Spoke with Son who wants his father to be comfortable, Hospice care eval Pending.

## 2022-02-23 NOTE — PROGRESS NOTE ADULT - ASSESSMENT
Patient is a 89M with a PMH of CHF?, prostate Ca who presents to the ED for dyspnea.  Patient currently AAOx1, unable to provide history.  Patient reportedly noted to have decreased appetite and wheezing by his HHA today who called EMS.  No current complaints.  SpO2 currently 96 on 4L via NC.  Vitals stable, labs show mild leukocytosis.    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   Even if foot is not the source, he needs good wound care and podiatric treatment     2/16: no fever, on NRB, + bacteremia, repeat BCs are pending, TTE pending, abx changed to cefazolin according to the sensitivity  2/17: remains afebrile, on NRB, lactate is better 2.4, R foot xray with no findings, repeat BCs pending, cefazolin continued, TTE and CT a/p pending.   Attending addendum:  agree with above   continue cefazolin and follow cultures   Transthoracic Echocardiogram to rule out endocarditis   will eventually need midline    2/18: no fever, on NRB, no WBC, Cr and LFTs ok, repeat BCs with no growth, TTE and CT a/p pending  2/19: rising lactate again, IVF, new pneumonia and right sided opacification, has been changed to nasal cannula and tolerating so far, requested that cefazolin be changed to vancomycin and cefepime pending further workup and improvement   2/22: possible hospice referal, otherwise needs chest tube vs bronchoscopy but frail and elderly so not the best candidate, he would need antibiotics until 3/16  2/23: hospice referal made, awaiting for evaluation, the pt is afebrile, remains on NRB, WBC 10.95, Cr ok, cefepime continued until 3/16 if within goals of care.       MSSA bacteremia   high serum lactate   right sided effusion with atelectasis  CHF   functional quadriplegia     Plan:   continue cefepime  follow culture data   trend lactate   trend wbc   caution with fluids    thoracic surgery-no bronch or surgery given high risk   frequent turns, offloading, and nutrition optimization to avoid bedsores  place midline if within goals of care    Discussed with Dr. Zavaleta

## 2022-02-23 NOTE — PROGRESS NOTE ADULT - SUBJECTIVE AND OBJECTIVE BOX
SUBJECTIVE AND OBJECTIVE: Patient in bed with NRB off his face, more alert today  INTERVAL HPI/OVERNIGHT EVENTS:    DNR on chart: yes  Allergies    No Known Allergies    Intolerances    MEDICATIONS  (STANDING):  ALBUTerol    90 MICROgram(s) HFA Inhaler 2 Puff(s) Inhalation every 6 hours  atorvastatin 20 milliGRAM(s) Oral at bedtime  bicalutamide 50 milliGRAM(s) Oral daily  cefepime   IVPB 2000 milliGRAM(s) IV Intermittent every 24 hours  cefepime   IVPB      dextrose 5%. 1000 milliLiter(s) (75 mL/Hr) IV Continuous <Continuous>  enoxaparin Injectable 50 milliGRAM(s) SubCutaneous daily  pantoprazole Infusion 8 mG/Hr (10 mL/Hr) IV Continuous <Continuous>  scopolamine 1 mG/72 Hr(s) Patch 1 Patch Transdermal every 72 hours    MEDICATIONS  (PRN):      ITEMS UNCHECKED ARE NOT PRESENT    PRESENT SYMPTOMS: [ ]Unable to obtain due to poor mentation   Source if other than patient:  [ ]Family   [ ]Team     Pain:  [ ]yes [x ]no  QOL impact -   Location -                    Aggravating factors -  Quality -  Radiation -  Timing-  Severity (0-10 scale):  Minimal acceptable level (0-10 scale):     Dyspnea:                           [ ]Mild [ ]Moderate [ ]Severe  Anxiety:                             [ ]Mild [ ]Moderate [ ]Severe  Fatigue:                             [ ]Mild [ ]Moderate [ ]Severe  Nausea:                             [ ]Mild [ ]Moderate [ ]Severe  Loss of appetite:              [ ]Mild [ ]Moderate [ ]Severe  Constipation:                    [ ]Mild [ ]Moderate [ ]Severe    PAIN AD Score:	3  http://geriatrictoolkit.[place].[email or web address]/cog/painad.pdf (Ctrl + left click to view)    Other Symptoms:  [ ]All other review of systems negative     Palliative Performance Status Version 2:       20  %      http://npcrc.org/files/news/palliative_performance_scale_ppsv2.pdf  PHYSICAL EXAM:  Vital Signs Last 24 Hrs  T(C): 36.3 (23 Feb 2022 10:42), Max: 36.3 (22 Feb 2022 16:37)  T(F): 97.4 (23 Feb 2022 10:42), Max: 97.4 (22 Feb 2022 16:37)  HR: 71 (23 Feb 2022 10:42) (71 - 83)  BP: 106/69 (23 Feb 2022 10:42) (106/69 - 117/70)  BP(mean): --  RR: 18 (23 Feb 2022 10:42) (18 - 18)  SpO2: 89% (23 Feb 2022 10:42) (89% - 95%) I&O's Summary     GENERAL:  [ x]Alert  [ ]Oriented x   [ ]Lethargic  [ ]Cachexia  [ ]Unarousable  [x ]Verbal  [ ]Non-Verbal  Behavioral:   [ ]Anxiety  [ ]Delirium [ ]Agitation [ ]Other  HEENT:  [ ]Normal   [ x]Dry mouth   [ ]ET Tube/Trach  [ ]Oral lesions  PULMONARY:   [ ]Clear [x ]Tachypnea  [ ]Audible excessive secretions   [ ]Rhonchi        [ ]Right [ ]Left [ ]Bilateral  [ ]Crackles        [ ]Right [ ]Left [ ]Bilateral  [ ]Wheezing     [ ]Right [ ]Left [ ]Bilateral  [ ]Diminished BS [ ] Right [ ]Left [ ]Bilateral  CARDIOVASCULAR:    [ x]Regular [ ]Irregular [ ]Tachy  [ ]Rudolph [ ]Murmur [ ]Other  GASTROINTESTINAL:  [x ]Soft  [ ]Distended   [ ]+BS  [ ]Non tender [ ]Tender  [ ]PEG [ ]OGT/ NGT   Last BM:  2/23/22  GENITOURINARY:  [ ]Normal [x ]Incontinent   [ ]Oliguria/Anuria   [ ]Lutz  MUSCULOSKELETAL:   [ ]Normal   [ ]Weakness  [x ]Bed/Wheelchair bound [ ]Edema  NEUROLOGIC:   [ ]No focal deficits  [x ] Cognitive impairment  [ ] Dysphagia [ ]Dysarthria [ ] Paresis [ ]Other   SKIN:   [ ]Normal  [ ]Rash   [x ]Pressure ulcer(s) right great toe unstageable, right heel unstageable, sacrum healed [ ]y [ ]n present on admission    CRITICAL CARE:  [ ]Shock Present  [ ]Septic [ ]Cardiogenic [ ]Neurologic [ ]Hypovolemic  [ ]Vasopressors [ ]Inotropes  [ ]Respiratory failure present [ ]Mechanical Ventilation [ ]Non-invasive ventilatory support [ ]High-Flow  [ ]Acute  [ ]Chronic [ ]Hypoxic  [ ]Hypercarbic [ ]Other  [ ]Other organ failure     LABS:                        9.6    11.94 )-----------( 184      ( 23 Feb 2022 11:59 )             31.2   02-23    147<H>  |  118<H>  |  21  ----------------------------<  104<H>  3.5   |  24  |  0.98    Ca    8.0<L>      23 Feb 2022 08:24    Occult Blood, Feces (02.23.22 @ 10:37)    Occult Blood, Feces: Positive    RADIOLOGY & ADDITIONAL STUDIES: none new    Protein Calorie Malnutrition Present: [ ]mild [ ]moderate [x ]severe [ ]underweight [ ]morbid obesity  https://www.andeal.org/vault/2440/web/files/ONC/Table_Clinical%20Characteristics%20to%20Document%20Malnutrition-White%20JV%20et%20al%202012.pdf    Height (cm): 167.6 (02-13-22 @ 17:02), 167.6 (09-27-21 @ 11:29), 167.6 (04-12-21 @ 12:23)  Weight (kg): 44.4 (02-14-22 @ 01:14), 63.5 (09-27-21 @ 11:29), 40.8 (04-12-21 @ 12:23)  BMI (kg/m2): 15.8 (02-14-22 @ 01:14), 22.6 (02-13-22 @ 17:02), 22.6 (09-27-21 @ 11:29)    [x ]PPSV2 < or = 30%  [ ]significant weight loss [x ]poor nutritional intake [ ]anasarca    [ ]Artificial Nutrition    REFERRALS:   [ ]Chaplaincy  [ ]Hospice  [ ]Child Life  [ ]Social Work  [ ]Case management [ ]Holistic Therapy     Goals of Care Document:     SUBJECTIVE AND OBJECTIVE: Patient in bed with NRB off his face, more alert today  INTERVAL HPI/OVERNIGHT EVENTS:    DNR on chart: yes  Allergies    No Known Allergies    Intolerances    MEDICATIONS  (STANDING):  ALBUTerol    90 MICROgram(s) HFA Inhaler 2 Puff(s) Inhalation every 6 hours  atorvastatin 20 milliGRAM(s) Oral at bedtime  bicalutamide 50 milliGRAM(s) Oral daily  cefepime   IVPB 2000 milliGRAM(s) IV Intermittent every 24 hours  cefepime   IVPB      dextrose 5%. 1000 milliLiter(s) (75 mL/Hr) IV Continuous <Continuous>  enoxaparin Injectable 50 milliGRAM(s) SubCutaneous daily  pantoprazole Infusion 8 mG/Hr (10 mL/Hr) IV Continuous <Continuous>  scopolamine 1 mG/72 Hr(s) Patch 1 Patch Transdermal every 72 hours    MEDICATIONS  (PRN):      ITEMS UNCHECKED ARE NOT PRESENT    PRESENT SYMPTOMS: [ ]Unable to obtain due to poor mentation   Source if other than patient:  [ ]Family   [ ]Team     Pain:  [ ]yes [x ]no  QOL impact -   Location -                    Aggravating factors -  Quality -  Radiation -  Timing-  Severity (0-10 scale):  Minimal acceptable level (0-10 scale):     Dyspnea:                           [ ]Mild [ ]Moderate [ ]Severe  Anxiety:                             [ ]Mild [ ]Moderate [ ]Severe  Fatigue:                             [ ]Mild [ ]Moderate [ ]Severe  Nausea:                             [ ]Mild [ ]Moderate [ ]Severe  Loss of appetite:              [ ]Mild [ ]Moderate [ ]Severe  Constipation:                    [ ]Mild [ ]Moderate [ ]Severe    PAIN AD Score:	3  http://geriatrictoolkit.[place].[facility]/cog/painad.pdf (Ctrl + left click to view)    Other Symptoms:  [ ]All other review of systems negative     Palliative Performance Status Version 2:       20  %      http://npcrc.org/files/news/palliative_performance_scale_ppsv2.pdf  PHYSICAL EXAM:  Vital Signs Last 24 Hrs  T(C): 36.3 (23 Feb 2022 10:42), Max: 36.3 (22 Feb 2022 16:37)  T(F): 97.4 (23 Feb 2022 10:42), Max: 97.4 (22 Feb 2022 16:37)  HR: 71 (23 Feb 2022 10:42) (71 - 83)  BP: 106/69 (23 Feb 2022 10:42) (106/69 - 117/70)  BP(mean): --  RR: 18 (23 Feb 2022 10:42) (18 - 18)  SpO2: 89% (23 Feb 2022 10:42) (89% - 95%) I&O's Summary     GENERAL:  [ x]Alert  [x ]Oriented x  2 [ ]Lethargic  [x ]Cachexia  [ ]Unarousable  [x ]Verbal  [ ]Non-Verbal  Behavioral:   [ ]Anxiety  [ x]Delirium [ ]Agitation [ ]Other  HEENT:  [ ]Normal   [ x]Dry mouth   [ ]ET Tube/Trach  [ ]Oral lesions  PULMONARY:   [ ]Clear [x ]Tachypnea  [ ]Audible excessive secretions   [ ]Rhonchi        [ ]Right [ ]Left [ ]Bilateral  [ ]Crackles        [ ]Right [ ]Left [ ]Bilateral  [ ]Wheezing     [ ]Right [ ]Left [ ]Bilateral  [x ]Diminished BS [ ] Right [ ]Left [ x]Bilateral  CARDIOVASCULAR:    [ x]Regular [ ]Irregular [ ]Tachy  [ ]Rudolph [ ]Murmur [ ]Other  GASTROINTESTINAL:  [x ]Soft  [ ]Distended   [ ]+BS  [ ]Non tender [ ]Tender  [ ]PEG [ ]OGT/ NGT   Last BM:  2/23/22 (bloody)  GENITOURINARY:  [ ]Normal [x ]Incontinent   [ ]Oliguria/Anuria   [ ]Lutz  MUSCULOSKELETAL:   [ ]Normal   [ ]Weakness  [x ]Bed/Wheelchair bound [ ]Edema  NEUROLOGIC:   [ ]No focal deficits  [x ] Cognitive impairment  [ x] Dysphagia [ ]Dysarthria [ ] Paresis [ ]Other   SKIN:   [ ]Normal  [ ]Rash   [x ]Pressure ulcer(s) right great toe unstageable, right heel unstageable, sacrum healed [ ]y [ ]n present on admission    CRITICAL CARE:  [ ]Shock Present  [ ]Septic [ ]Cardiogenic [ ]Neurologic [ ]Hypovolemic  [ ]Vasopressors [ ]Inotropes  [ ]Respiratory failure present [ ]Mechanical Ventilation [ ]Non-invasive ventilatory support [ ]High-Flow  [ ]Acute  [ ]Chronic [ ]Hypoxic  [ ]Hypercarbic [ ]Other  [ ]Other organ failure     LABS:                        9.6    11.94 )-----------( 184      ( 23 Feb 2022 11:59 )             31.2   02-23    147<H>  |  118<H>  |  21  ----------------------------<  104<H>  3.5   |  24  |  0.98    Ca    8.0<L>      23 Feb 2022 08:24    Occult Blood, Feces (02.23.22 @ 10:37)    Occult Blood, Feces: Positive    RADIOLOGY & ADDITIONAL STUDIES: none new    Protein Calorie Malnutrition Present: [ ]mild [ ]moderate [x ]severe [ ]underweight [ ]morbid obesity  https://www.andeal.org/vault/2440/web/files/ONC/Table_Clinical%20Characteristics%20to%20Document%20Malnutrition-White%20JV%20et%20al%202012.pdf    Height (cm): 167.6 (02-13-22 @ 17:02), 167.6 (09-27-21 @ 11:29), 167.6 (04-12-21 @ 12:23)  Weight (kg): 44.4 (02-14-22 @ 01:14), 63.5 (09-27-21 @ 11:29), 40.8 (04-12-21 @ 12:23)  BMI (kg/m2): 15.8 (02-14-22 @ 01:14), 22.6 (02-13-22 @ 17:02), 22.6 (09-27-21 @ 11:29)    [x ]PPSV2 < or = 30%  [ ]significant weight loss [x ]poor nutritional intake [ ]anasarca    [ ]Artificial Nutrition    REFERRALS:   [ ]Chaplaincy  [ x]Hospice  [ ]Child Life  [x ]Social Work  [ ]Case management [ ]Holistic Therapy     Goals of Care Document:

## 2022-02-23 NOTE — PROGRESS NOTE ADULT - SUBJECTIVE AND OBJECTIVE BOX
INTERVAL HPI:  89M with HTN, CHF?, Prostate Ca who presents to the ED for dyspnea, wheezing,  wet cough, and decreased appetite for two weeks.  Found to have PE, Pneumonia, Dehydration, hypernatremia and Renal insuffiencey. Blood cultures are growing staph Aureus.  Not able to provide more details.  Started on IV heparin, IV fluids, NRB mask and antibiotics.    OVERNIGHT EVENTS:  Awake and responsive. Does not keep NRB mask on.    Vital Signs Last 24 Hrs  T(C): 36.3 (23 Feb 2022 10:42), Max: 36.3 (22 Feb 2022 16:37)  T(F): 97.4 (23 Feb 2022 10:42), Max: 97.4 (22 Feb 2022 16:37)  HR: 71 (23 Feb 2022 10:42) (71 - 83)  BP: 106/69 (23 Feb 2022 10:42) (106/69 - 117/70)  BP(mean): --  RR: 18 (23 Feb 2022 10:42) (18 - 18)  SpO2: 89% (23 Feb 2022 10:42) (89% - 95%)    PHYSICAL EXAM:  GEN:         Awake, responsive and comfortable.  HEENT:    Normal.    RESP:       no wheezing.  CVS:          Regular rate and rhythm.     MEDICATIONS  (STANDING):  ALBUTerol    90 MICROgram(s) HFA Inhaler 2 Puff(s) Inhalation every 6 hours  atorvastatin 20 milliGRAM(s) Oral at bedtime  bicalutamide 50 milliGRAM(s) Oral daily  cefepime   IVPB 2000 milliGRAM(s) IV Intermittent every 24 hours  cefepime   IVPB      dextrose 5%. 1000 milliLiter(s) (75 mL/Hr) IV Continuous <Continuous>  enoxaparin Injectable 50 milliGRAM(s) SubCutaneous daily  pantoprazole Infusion 8 mG/Hr (10 mL/Hr) IV Continuous <Continuous>  scopolamine 1 mG/72 Hr(s) Patch 1 Patch Transdermal every 72 hours    LABS:                        9.4    10.95 )-----------( 167      ( 23 Feb 2022 08:24 )             30.5     02-23    147<H>  |  118<H>  |  21  ----------------------------<  104<H>  3.5   |  24  |  0.98    Ca    8.0<L>      23 Feb 2022 08:24    ASSESSMENT AND PLAN:  ·	Acute PE.  ·	RLL pneumonia.  ·	Staph Aureus bacteremia.  ·	Dehydration.  ·	Hypernatremia.  ·	Renal Insuffiencey  ·	Prostate CA.  ·	HTN.  ·	RUL+ RML atelectasis.  ·	Right pleural effusion.    Not keeping NRB mask on all the time. SPO2 not picked up, looks comfortable without O2.  Can try on 4-6 litre nasal O2,.  On chest vest for airway clearance  Antibiotics per ID.  Albuterol MDI  Being seen by palliative care.

## 2022-02-23 NOTE — PROGRESS NOTE ADULT - ATTENDING COMMENTS
agree with above   continue antibiotics   PICC/midline next week   wean off oxygen     Coy Rey, DO  Infectious Disease Attending  Reachable via [employer] Teams or ID office: [phone]  After 5pm/weekends please call [phone] for all inquiries and new consults
agree with above   continue cefazolin and follow cultures   Transthoracic Echocardiogram to rule out endocarditis   will eventually need midline    Coy eRy, DO  Infectious Disease Attending  Reachable via [employer] Teams or ID office: [phone]  After 5pm/weekends please call [phone] for all inquiries and new consults
agree with above    patient keeps pulling off NRB and his saturation drops to the low 80s  hospice referal placed   will continue cefepime for now though unclear if it will be continues if accepted to hospice   may also change to back to cefazolin after 7-10 days of cefepime    D/W Dr. Sarah Rey, DO  Infectious Disease Attending  Reachable via [employer] Teams or ID office: [phone]  After 5pm/weekends please call [phone] for all inquiries and new consults
I have seen and evaluated this patient independently and agree with the above findings as follows: 89 y/o with FTT, PE, PNA likely aspiration remains with high oxygen requirements and no interval improvement. [facility] requested for inpatient level of care.   D/w Dr. Smith
Pt seen and examined with NP Viktoria, agree with above assessment and plan, note adjusted where needed- pt appears dyspneic uncomfortable and has per RN had multiple bloody BMs today with acute drop in H/H ... pt overall declining, breathing more labored- morphine IV ordered, pt receiving PPI. Hospice referral requested.   D/ w Dr. Smith

## 2022-02-23 NOTE — PROGRESS NOTE ADULT - SUBJECTIVE AND OBJECTIVE BOX
INTERVAL HPI/OVERNIGHT EVENTS: Pt seen and examined at bedside. pt is more lethargic today. Pt had black colored stools and his Hb dropped from 12 --> 9.    89y  Vital Signs Last 24 Hrs  T(C): 36.3 (23 Feb 2022 10:42), Max: 36.3 (22 Feb 2022 16:37)  T(F): 97.4 (23 Feb 2022 10:42), Max: 97.4 (22 Feb 2022 16:37)  HR: 71 (23 Feb 2022 10:42) (71 - 83)  BP: 106/69 (23 Feb 2022 10:42) (106/69 - 117/70)  BP(mean): --  RR: 18 (23 Feb 2022 10:42) (18 - 18)  SpO2: 89% (23 Feb 2022 10:42) (89% - 95%)  I&O's Summary    MEDICATIONS  (STANDING):  ALBUTerol    90 MICROgram(s) HFA Inhaler 2 Puff(s) Inhalation every 6 hours  atorvastatin 20 milliGRAM(s) Oral at bedtime  bicalutamide 50 milliGRAM(s) Oral daily  cefepime   IVPB 2000 milliGRAM(s) IV Intermittent every 24 hours  cefepime   IVPB      dextrose 5%. 1000 milliLiter(s) (75 mL/Hr) IV Continuous <Continuous>  enoxaparin Injectable 50 milliGRAM(s) SubCutaneous daily  morphine  - Injectable 0.5 milliGRAM(s) IV Push every 6 hours  pantoprazole Infusion 8 mG/Hr (10 mL/Hr) IV Continuous <Continuous>  scopolamine 1 mG/72 Hr(s) Patch 1 Patch Transdermal every 72 hours    MEDICATIONS  (PRN):  morphine  - Injectable 1 milliGRAM(s) IV Push every 3 hours PRN dyspnea or severe pain    LABS:    trop                        9.6    11.94 )-----------( 184      ( 23 Feb 2022 11:59 )             31.2     02-23    147<H>  |  118<H>  |  21  ----------------------------<  104<H>  3.5   |  24  |  0.98    Ca    8.0<L>      23 Feb 2022 08:24          CAPILLARY BLOOD GLUCOSE      RADIOLOGY & ADDITIONAL TESTS:    Imaging Personally Reviewed:  [ ] YES  [ ] NO    Consultant(s) Notes Reviewed:  [x ] YES  [ ] NO    PHYSICAL EXAM:  GENERAL: NAD, on NRB  NERVOUS SYSTEM:  lethargic  CHEST/LUNG: Diminished BS b/l.  HEART: Regular rate and rhythm; No murmurs, rubs, or gallops  ABDOMEN: Soft, Nontender, Nondistended; Bowel sounds present  EXTREMITIES:  No clubbing, cyanosis, or edema      A & P:        Care Discussed with Consultants/Other Providers [ x] YES  [ ] NO

## 2022-02-23 NOTE — PROGRESS NOTE ADULT - PROBLEM SELECTOR PLAN 6
DISPLAY PLAN FREE TEXT positive FOBT   started on protonix gtt  hemoglobin lower than prior days  on lovenox for PE-monitor for s/s of bleeding positive FOBT, multiple bloody BMs today   started on protonix gtt  hemoglobin lower than prior days  on lovenox for PE-monitor for s/s of bleeding

## 2022-02-24 LAB
ANION GAP SERPL CALC-SCNC: 6 MMOL/L — SIGNIFICANT CHANGE UP (ref 5–17)
BUN SERPL-MCNC: 18 MG/DL — SIGNIFICANT CHANGE UP (ref 7–23)
CALCIUM SERPL-MCNC: 7.8 MG/DL — LOW (ref 8.5–10.1)
CHLORIDE SERPL-SCNC: 115 MMOL/L — HIGH (ref 96–108)
CO2 SERPL-SCNC: 22 MMOL/L — SIGNIFICANT CHANGE UP (ref 22–31)
CREAT SERPL-MCNC: 0.93 MG/DL — SIGNIFICANT CHANGE UP (ref 0.5–1.3)
GLUCOSE SERPL-MCNC: 98 MG/DL — SIGNIFICANT CHANGE UP (ref 70–99)
HCT VFR BLD CALC: 29.1 % — LOW (ref 39–50)
HCT VFR BLD CALC: 30.1 % — LOW (ref 39–50)
HGB BLD-MCNC: 9 G/DL — LOW (ref 13–17)
HGB BLD-MCNC: 9.4 G/DL — LOW (ref 13–17)
MCHC RBC-ENTMCNC: 27.7 PG — SIGNIFICANT CHANGE UP (ref 27–34)
MCHC RBC-ENTMCNC: 27.9 PG — SIGNIFICANT CHANGE UP (ref 27–34)
MCHC RBC-ENTMCNC: 30.9 G/DL — LOW (ref 32–36)
MCHC RBC-ENTMCNC: 31.2 G/DL — LOW (ref 32–36)
MCV RBC AUTO: 89.3 FL — SIGNIFICANT CHANGE UP (ref 80–100)
MCV RBC AUTO: 89.5 FL — SIGNIFICANT CHANGE UP (ref 80–100)
NRBC # BLD: 0 /100 WBCS — SIGNIFICANT CHANGE UP (ref 0–0)
NRBC # BLD: 0 /100 WBCS — SIGNIFICANT CHANGE UP (ref 0–0)
PLATELET # BLD AUTO: 157 K/UL — SIGNIFICANT CHANGE UP (ref 150–400)
PLATELET # BLD AUTO: 160 K/UL — SIGNIFICANT CHANGE UP (ref 150–400)
POTASSIUM SERPL-MCNC: 3.3 MMOL/L — LOW (ref 3.5–5.3)
POTASSIUM SERPL-SCNC: 3.3 MMOL/L — LOW (ref 3.5–5.3)
RBC # BLD: 3.25 M/UL — LOW (ref 4.2–5.8)
RBC # BLD: 3.37 M/UL — LOW (ref 4.2–5.8)
RBC # FLD: 16.5 % — HIGH (ref 10.3–14.5)
RBC # FLD: 16.7 % — HIGH (ref 10.3–14.5)
SODIUM SERPL-SCNC: 143 MMOL/L — SIGNIFICANT CHANGE UP (ref 135–145)
WBC # BLD: 10.28 K/UL — SIGNIFICANT CHANGE UP (ref 3.8–10.5)
WBC # BLD: 12.54 K/UL — HIGH (ref 3.8–10.5)
WBC # FLD AUTO: 10.28 K/UL — SIGNIFICANT CHANGE UP (ref 3.8–10.5)
WBC # FLD AUTO: 12.54 K/UL — HIGH (ref 3.8–10.5)

## 2022-02-24 PROCEDURE — 99233 SBSQ HOSP IP/OBS HIGH 50: CPT

## 2022-02-24 PROCEDURE — 99232 SBSQ HOSP IP/OBS MODERATE 35: CPT

## 2022-02-24 RX ORDER — POTASSIUM CHLORIDE 20 MEQ
10 PACKET (EA) ORAL
Refills: 0 | Status: DISCONTINUED | OUTPATIENT
Start: 2022-02-24 | End: 2022-02-24

## 2022-02-24 RX ORDER — POTASSIUM CHLORIDE 20 MEQ
20 PACKET (EA) ORAL ONCE
Refills: 0 | Status: DISCONTINUED | OUTPATIENT
Start: 2022-02-24 | End: 2022-02-24

## 2022-02-24 RX ORDER — POTASSIUM CHLORIDE 20 MEQ
10 PACKET (EA) ORAL
Refills: 0 | Status: COMPLETED | OUTPATIENT
Start: 2022-02-24 | End: 2022-02-24

## 2022-02-24 RX ADMIN — CEFEPIME 100 MILLIGRAM(S): 1 INJECTION, POWDER, FOR SOLUTION INTRAMUSCULAR; INTRAVENOUS at 09:43

## 2022-02-24 RX ADMIN — Medication 100 MILLIEQUIVALENT(S): at 08:47

## 2022-02-24 RX ADMIN — SCOPALAMINE 1 PATCH: 1 PATCH, EXTENDED RELEASE TRANSDERMAL at 07:23

## 2022-02-24 RX ADMIN — SODIUM CHLORIDE 75 MILLILITER(S): 9 INJECTION, SOLUTION INTRAVENOUS at 05:22

## 2022-02-24 RX ADMIN — SCOPALAMINE 1 PATCH: 1 PATCH, EXTENDED RELEASE TRANSDERMAL at 23:06

## 2022-02-24 RX ADMIN — BICALUTAMIDE 50 MILLIGRAM(S): 50 TABLET, FILM COATED ORAL at 11:57

## 2022-02-24 RX ADMIN — MORPHINE SULFATE 0.5 MILLIGRAM(S): 50 CAPSULE, EXTENDED RELEASE ORAL at 01:48

## 2022-02-24 RX ADMIN — MORPHINE SULFATE 0.5 MILLIGRAM(S): 50 CAPSULE, EXTENDED RELEASE ORAL at 00:37

## 2022-02-24 RX ADMIN — MORPHINE SULFATE 0.5 MILLIGRAM(S): 50 CAPSULE, EXTENDED RELEASE ORAL at 11:56

## 2022-02-24 RX ADMIN — PANTOPRAZOLE SODIUM 10 MG/HR: 20 TABLET, DELAYED RELEASE ORAL at 05:39

## 2022-02-24 RX ADMIN — MORPHINE SULFATE 0.5 MILLIGRAM(S): 50 CAPSULE, EXTENDED RELEASE ORAL at 06:25

## 2022-02-24 RX ADMIN — SCOPALAMINE 1 PATCH: 1 PATCH, EXTENDED RELEASE TRANSDERMAL at 18:22

## 2022-02-24 RX ADMIN — MORPHINE SULFATE 0.5 MILLIGRAM(S): 50 CAPSULE, EXTENDED RELEASE ORAL at 13:03

## 2022-02-24 RX ADMIN — SODIUM CHLORIDE 75 MILLILITER(S): 9 INJECTION, SOLUTION INTRAVENOUS at 18:23

## 2022-02-24 RX ADMIN — Medication 100 MILLIEQUIVALENT(S): at 05:23

## 2022-02-24 RX ADMIN — Medication 100 MILLIEQUIVALENT(S): at 06:43

## 2022-02-24 RX ADMIN — PANTOPRAZOLE SODIUM 10 MG/HR: 20 TABLET, DELAYED RELEASE ORAL at 17:28

## 2022-02-24 RX ADMIN — MORPHINE SULFATE 0.5 MILLIGRAM(S): 50 CAPSULE, EXTENDED RELEASE ORAL at 05:22

## 2022-02-24 NOTE — PROGRESS NOTE ADULT - PROBLEM SELECTOR PLAN 1
on NRB-takes it off at times  consider titrating oxygen down  on lovenox  some increased work of breathing noted on exam

## 2022-02-24 NOTE — PROGRESS NOTE ADULT - ASSESSMENT
Patient is a 89M with a PMH of CHF?, prostate Ca who presents to the ED for dyspnea.  Patient currently AAOx1, unable to provide history.  Patient reportedly noted to have decreased appetite and wheezing by his HHA today who called EMS.  No current complaints.  SpO2 currently 96 on 4L via NC.  Vitals stable, labs show mild leukocytosis.    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   Even if foot is not the source, he needs good wound care and podiatric treatment     2/16: no fever, on NRB, + bacteremia, repeat BCs are pending, TTE pending, abx changed to cefazolin according to the sensitivity  2/17: remains afebrile, on NRB, lactate is better 2.4, R foot xray with no findings, repeat BCs pending, cefazolin continued, TTE and CT a/p pending.   Attending addendum:  agree with above   continue cefazolin and follow cultures   Transthoracic Echocardiogram to rule out endocarditis   will eventually need midline    2/18: no fever, on NRB, no WBC, Cr and LFTs ok, repeat BCs with no growth, TTE and CT a/p pending  2/19: rising lactate again, IVF, new pneumonia and right sided opacification, has been changed to nasal cannula and tolerating so far, requested that cefazolin be changed to vancomycin and cefepime pending further workup and improvement   2/22: possible hospice referal, otherwise needs chest tube vs bronchoscopy but frail and elderly so not the best candidate, he would need antibiotics until 3/16  2/23: hospice referal made, awaiting for evaluation, the pt is afebrile, remains on NRB, WBC 10.95, Cr ok, cefepime continued until 3/16 if within goals of care.   Attending addendum:  agree with above  patient keeps pulling off NRB and his saturation drops to the low 80s  hospice referal placed   will continue cefepime for now though unclear if it will be continues if accepted to hospice   may also change to back to cefazolin after 7-10 days of cefepime    2/24: on NRB O2 SAT fluctuates, lethargic, no fevers, WBC normalized, repeat BCs remain with no growth, Cr ok. Cefepime continued for now if within goals of care, March 29th would be end date for abx to treat bacteremia and PNA. As per palliative care note, "hospice will accept patient for inpatient care, but can not take patient on current oxygen requirements."    MSSA bacteremia   high serum lactate   right sided effusion with atelectasis, PNA  CHF   functional quadriplegia     Plan:   continue cefepime until 3/29 if within goals of care   if pt will be accepted to inpatient hospice, stop all abx  follow culture data   trend lactate   trend wbc   caution with fluids    thoracic surgery-no bronch or surgery given high risk   frequent turns, offloading, and nutrition optimization to avoid bedsores  place midline if within goals of care    Will sign off, re-consult as needed    Discussed with Dr. Candido Monahan sent to Dr. Smith

## 2022-02-24 NOTE — PROGRESS NOTE ADULT - SUBJECTIVE AND OBJECTIVE BOX
SANDRA ROCHE  MRN-[id number]    Follow Up:  MSSA bacteremia    Interval History: the pt was seen and examined earlier, no distress, remains on NRB, lethargic, O2 SAT drops intermittently. The pt is afebrile, WBC normalized, Cr ok.      PAST MEDICAL & SURGICAL HISTORY:  HTN (hypertension)    CHF (congestive heart failure)    Prostate CA    History of tonsillectomy        ROS:    [x ] Unobtainable because: pt is lethargic and confused   [ ] All other systems negative    Constitutional: no fever, no chills  Head: no trauma  Eyes: no vision changes, no eye pain  ENT:  no sore throat, no rhinorrhea  Cardiovascular:  no chest pain, no palpitation  Respiratory:  no SOB, no cough  GI:  no abd pain, no vomiting, no diarrhea  urinary: no dysuria, no hematuria, no flank pain  musculoskeletal:  no joint pain, no joint swelling  skin:  no rash  neurology:  no headache, no seizure, no change in mental status  psych: no anxiety, no depression         Allergies  No Known Allergies        ANTIMICROBIALS:  cefepime   IVPB 2000 every 24 hours  cefepime   IVPB        OTHER MEDS:  ALBUTerol    90 MICROgram(s) HFA Inhaler 2 Puff(s) Inhalation every 6 hours  atorvastatin 20 milliGRAM(s) Oral at bedtime  bicalutamide 50 milliGRAM(s) Oral daily  dextrose 5%. 1000 milliLiter(s) IV Continuous <Continuous>  morphine  - Injectable 0.5 milliGRAM(s) IV Push every 6 hours  morphine  - Injectable 1 milliGRAM(s) IV Push every 3 hours PRN  pantoprazole Infusion 8 mG/Hr IV Continuous <Continuous>  scopolamine 1 mG/72 Hr(s) Patch 1 Patch Transdermal every 72 hours      Vital Signs Last 24 Hrs  T(C): 36.2 (24 Feb 2022 10:21), Max: 36.3 (24 Feb 2022 00:49)  T(F): 97.2 (24 Feb 2022 10:21), Max: 97.4 (24 Feb 2022 00:49)  HR: 62 (24 Feb 2022 10:21) (58 - 62)  BP: 100/62 (24 Feb 2022 10:21) (95/58 - 103/65)  BP(mean): --  RR: 18 (24 Feb 2022 10:21) (16 - 18)  SpO2: 97% (24 Feb 2022 10:21) (97% - 100%)    Physical Exam:  Constitutional: chronically ill appearing  no distress, cachectic, on NRB  HEAD/EYES: anicteric, no conjunctival injection  ENT:  supple, dry mouth, no teeth   Cardiovascular:   normal S1, S2, no murmur, no edema  Respiratory:  diminished breath sounds bilaterally, no rhonchi, no wheezes, no rales  GI:  soft, non-tender, normal bowel sounds  Musculoskeletal:  no synovitis,  right hallux dry gangrene   Neurologic: lethargic, opens his eyes  Skin:  no rash, no erythema, no phlebitis  Heme/Onc: no cervical lymphadenopathy   Psychiatric:  unable to assess     WBC Count: 10.28 K/uL (02-24 @ 04:23)  WBC Count: 12.54 K/uL (02-23 @ 22:41)  WBC Count: 10.96 K/uL (02-23 @ 16:19)  WBC Count: 11.94 K/uL (02-23 @ 11:59)  WBC Count: 10.95 K/uL (02-23 @ 08:24)  WBC Count: 8.85 K/uL (02-22 @ 08:46)  WBC Count: 9.55 K/uL (02-21 @ 11:38)                            9.4    10.28 )-----------( 157      ( 24 Feb 2022 04:23 )             30.1       02-24    143  |  115<H>  |  18  ----------------------------<  98  3.3<L>   |  22  |  0.93    Ca    7.8<L>      24 Feb 2022 04:23            Creatinine Trend: 0.93<--, 0.98<--, 1.13<--, 1.07<--, 1.15<--, 1.40<--      MICROBIOLOGY:  v  .Blood Blood-Peripheral  02-17-22   No Growth Final  --  --      .Blood Blood-Peripheral  02-16-22   No Growth Final  --  --      .Blood Blood  02-14-22   Growth in aerobic and anaerobic bottles: Staphylococcus aureus  ***Blood Panel PCR results on this specimen are available  approximately 3 hours after the Gram stain result.***  Gram stain, PCR, and/or culture results may not always  correspond dueto difference in methodologies.  ************************************************************  This PCR assay was performed by multiplex PCR. This  Assay tests for 66 bacterial and resistance gene targets.  Please refer to the [facility] Labs test directory  at [email or web address] for details.  --  Blood Culture PCR  Staphylococcus aureus      D-Dimer Assay, Quantitative: 6004 (02-13)    Procalcitonin, Serum: 0.57 (02-20-22 @ 10:43)    SARS-CoV-2 Result: NotDetec (02-20-22 @ 08:46)      RADIOLOGY:

## 2022-02-24 NOTE — PROGRESS NOTE ADULT - PROBLEM SELECTOR PLAN 6
positive FOBT, multiple bloody BMs yesterday  started on protonix gtt  hemoglobin lower than prior days-stable from yesterday  on lovenox for PE-monitor for s/s of bleeding

## 2022-02-24 NOTE — PROGRESS NOTE ADULT - SUBJECTIVE AND OBJECTIVE BOX
INTERVAL HPI/OVERNIGHT EVENTS: Pt seen and examined at bedside. Pt is lethargic.    89y  Vital Signs Last 24 Hrs  T(C): 36.2 (24 Feb 2022 10:21), Max: 36.3 (24 Feb 2022 00:49)  T(F): 97.2 (24 Feb 2022 10:21), Max: 97.4 (24 Feb 2022 00:49)  HR: 62 (24 Feb 2022 10:21) (58 - 93)  BP: 100/62 (24 Feb 2022 10:21) (95/58 - 103/65)  BP(mean): --  RR: 18 (24 Feb 2022 10:21) (16 - 18)  SpO2: 97% (24 Feb 2022 10:21) (90% - 100%)  I&O's Summary    23 Feb 2022 07:01  -  24 Feb 2022 07:00  --------------------------------------------------------  IN: 0 mL / OUT: 1 mL / NET: -1 mL      MEDICATIONS  (STANDING):  ALBUTerol    90 MICROgram(s) HFA Inhaler 2 Puff(s) Inhalation every 6 hours  atorvastatin 20 milliGRAM(s) Oral at bedtime  bicalutamide 50 milliGRAM(s) Oral daily  cefepime   IVPB 2000 milliGRAM(s) IV Intermittent every 24 hours  cefepime   IVPB      dextrose 5%. 1000 milliLiter(s) (75 mL/Hr) IV Continuous <Continuous>  morphine  - Injectable 0.5 milliGRAM(s) IV Push every 6 hours  pantoprazole Infusion 8 mG/Hr (10 mL/Hr) IV Continuous <Continuous>  scopolamine 1 mG/72 Hr(s) Patch 1 Patch Transdermal every 72 hours    MEDICATIONS  (PRN):  morphine  - Injectable 1 milliGRAM(s) IV Push every 3 hours PRN dyspnea or severe pain    LABS:    trop                        9.4    10.28 )-----------( 157      ( 24 Feb 2022 04:23 )             30.1     02-24    143  |  115<H>  |  18  ----------------------------<  98  3.3<L>   |  22  |  0.93    Ca    7.8<L>      24 Feb 2022 04:23          CAPILLARY BLOOD GLUCOSE        RADIOLOGY & ADDITIONAL TESTS:    Imaging Personally Reviewed:  [ ] YES  [ ] NO    Consultant(s) Notes Reviewed:  [x ] YES  [ ] NO    PHYSICAL EXAM:  GENERAL: NAD, on NRB  NERVOUS SYSTEM:  lethargic  CHEST/LUNG: Diminished BS b/l.  HEART: Regular rate and rhythm; No murmurs, rubs, or gallops  ABDOMEN: Soft, Nontender, Nondistended; Bowel sounds present  EXTREMITIES:  No clubbing, cyanosis, or edema      A & P:        Care Discussed with Consultants/Other Providers [ x] YES  [ ] NO

## 2022-02-24 NOTE — PROGRESS NOTE ADULT - ASSESSMENT
a 89M with a PMH of CHF?, prostate Ca who presents to the ED for dyspnea.  Patient currently AAOx1, unable to provide history.  Patient reportedly noted to have decreased appetite and wheezing by his HHA today who called EMS.  No current complaints.  SpO2 currently 96 on 4L via NC.  Vitals stable, labs show mild leukocytosis.  Will admit to tele.      Metabolic encephalopathy.    Hx of failure to thrive   - Unclear baseline mental status.  Patient presents with hypothermia,   As per son- was congested/ not eating much   Usually pt is bedbound on a wheelchair for most of the day-mainly due to general weakness for over more than 6months, has dementia    passed speech and swallow- on pureed diet, not eating much today- will also add D5   Pt's son refused NGT at this time.    Bacteremia  one blood culture from 2/14 growing staph aureus  Repeat blood cultures negative  CXR performed on 2/18- showing Complete opacification of RIGHT hemithorax.   -reviewed CT of chest/abd/pelvis   -c/w cefepime    Acute pulmonary embolus.  RLL Pneumonia.   Mucous plug  Underlying COPD  CTA-Pulmonary embolism in the segmental right lower lobe  switched heparin drip to lovenox (theraputic dose) since pt agitated and not easy stick  Will need to switch to a DOAC but will need to estimate risks/vs benefits given hx of gastric ulcers and hx of bleeding in past  -continue antibiotics  -continue respiratory suctioning, chest vest  -appreciate thoracic surgery-no bronch or surgery given high risk    -Pulm following pt  -Pallative on board as well      Troponin level elevated.   - Troponin elevated-most likely demand ischemia  No current chest pain.   As per Cardiology - "no concomitant evidence of acute ischemia clinically or on ECG - likely type II MI in setting of CAP/PE."    Chronic CHF.   - spironolactone (on hold)   Holding Lasix and aldactone now in setting of hypernatremia and dehydration   -Received two doses of IV 40mg Lasix due to congestion on 2/18  ASA relatively contraindicated due to recurrent GI bleed in the past  Bbl relatively contraindicated due to emphysema, smoking history  will restart on Statin   ECHO reviewed- EF preserved     AMNA (acute kidney injury).   -monitor    Hypernatremia.   -resolved  Hypokalemia  -continue to supplement and monitor     Prostate cancer.   ·  Plan: bicalutamide.    Right hallux and heal wounds   -continue wound care  -follow up with Podiatry   -xray no acute findings     ? GIB  + ?melena  Hb 12-->9.  Monitor Hb  c/w protonix drip  He refused EGD.    dvtppx- hold Lovenox sec to GIB  SCD    Son- Eren- [phone]---updated the plan,   [facility]- [facility] signed- pt is DNR/DNI  Spoke with Son who wants his father to be comfortable, Hospice care eval Pending.    Hospice will accept patient for in patient care, if his O2 is aroung 6l, will try to titrate down and monitor.

## 2022-02-25 LAB
BASE EXCESS BLDA CALC-SCNC: -1.8 MMOL/L — SIGNIFICANT CHANGE UP (ref -2–3)
BLOOD GAS COMMENTS: SIGNIFICANT CHANGE UP
BLOOD GAS COMMENTS: SIGNIFICANT CHANGE UP
CO2 BLDA-SCNC: 24 MMOL/L — SIGNIFICANT CHANGE UP (ref 19–24)
HCO3 BLDA-SCNC: 23 MMOL/L — SIGNIFICANT CHANGE UP (ref 21–28)
HCT VFR BLD CALC: 33.1 % — LOW (ref 39–50)
HGB BLD-MCNC: 9.9 G/DL — LOW (ref 13–17)
HOROWITZ INDEX BLDA+IHG-RTO: 100 — SIGNIFICANT CHANGE UP
MCHC RBC-ENTMCNC: 27.1 PG — SIGNIFICANT CHANGE UP (ref 27–34)
MCHC RBC-ENTMCNC: 29.9 G/DL — LOW (ref 32–36)
MCV RBC AUTO: 90.7 FL — SIGNIFICANT CHANGE UP (ref 80–100)
NRBC # BLD: 0 /100 WBCS — SIGNIFICANT CHANGE UP (ref 0–0)
PCO2 BLDA: 39 MMHG — SIGNIFICANT CHANGE UP (ref 32–46)
PH BLD: 7.38 — SIGNIFICANT CHANGE UP (ref 7.35–7.45)
PLATELET # BLD AUTO: 155 K/UL — SIGNIFICANT CHANGE UP (ref 150–400)
PO2 BLDA: 121 MMHG — HIGH (ref 83–108)
RBC # BLD: 3.65 M/UL — LOW (ref 4.2–5.8)
RBC # FLD: 16.7 % — HIGH (ref 10.3–14.5)
SAO2 % BLDA: 99.7 % — HIGH (ref 94–98)
WBC # BLD: 9.16 K/UL — SIGNIFICANT CHANGE UP (ref 3.8–10.5)
WBC # FLD AUTO: 9.16 K/UL — SIGNIFICANT CHANGE UP (ref 3.8–10.5)

## 2022-02-25 PROCEDURE — 99233 SBSQ HOSP IP/OBS HIGH 50: CPT

## 2022-02-25 PROCEDURE — 99232 SBSQ HOSP IP/OBS MODERATE 35: CPT

## 2022-02-25 RX ORDER — PANTOPRAZOLE SODIUM 20 MG/1
40 TABLET, DELAYED RELEASE ORAL EVERY 12 HOURS
Refills: 0 | Status: DISCONTINUED | OUTPATIENT
Start: 2022-02-25 | End: 2022-02-28

## 2022-02-25 RX ADMIN — MORPHINE SULFATE 0.5 MILLIGRAM(S): 50 CAPSULE, EXTENDED RELEASE ORAL at 05:52

## 2022-02-25 RX ADMIN — SCOPALAMINE 1 PATCH: 1 PATCH, EXTENDED RELEASE TRANSDERMAL at 18:11

## 2022-02-25 RX ADMIN — ATORVASTATIN CALCIUM 20 MILLIGRAM(S): 80 TABLET, FILM COATED ORAL at 22:22

## 2022-02-25 RX ADMIN — ALBUTEROL 2 PUFF(S): 90 AEROSOL, METERED ORAL at 12:00

## 2022-02-25 RX ADMIN — MORPHINE SULFATE 0.5 MILLIGRAM(S): 50 CAPSULE, EXTENDED RELEASE ORAL at 01:30

## 2022-02-25 RX ADMIN — SODIUM CHLORIDE 75 MILLILITER(S): 9 INJECTION, SOLUTION INTRAVENOUS at 05:52

## 2022-02-25 RX ADMIN — PANTOPRAZOLE SODIUM 40 MILLIGRAM(S): 20 TABLET, DELAYED RELEASE ORAL at 18:09

## 2022-02-25 RX ADMIN — MORPHINE SULFATE 0.5 MILLIGRAM(S): 50 CAPSULE, EXTENDED RELEASE ORAL at 07:46

## 2022-02-25 RX ADMIN — MORPHINE SULFATE 0.5 MILLIGRAM(S): 50 CAPSULE, EXTENDED RELEASE ORAL at 11:59

## 2022-02-25 RX ADMIN — MORPHINE SULFATE 0.5 MILLIGRAM(S): 50 CAPSULE, EXTENDED RELEASE ORAL at 18:12

## 2022-02-25 RX ADMIN — PANTOPRAZOLE SODIUM 10 MG/HR: 20 TABLET, DELAYED RELEASE ORAL at 16:14

## 2022-02-25 RX ADMIN — MORPHINE SULFATE 0.5 MILLIGRAM(S): 50 CAPSULE, EXTENDED RELEASE ORAL at 18:09

## 2022-02-25 RX ADMIN — PANTOPRAZOLE SODIUM 10 MG/HR: 20 TABLET, DELAYED RELEASE ORAL at 00:34

## 2022-02-25 RX ADMIN — ALBUTEROL 2 PUFF(S): 90 AEROSOL, METERED ORAL at 18:09

## 2022-02-25 RX ADMIN — BICALUTAMIDE 50 MILLIGRAM(S): 50 TABLET, FILM COATED ORAL at 12:00

## 2022-02-25 RX ADMIN — SCOPALAMINE 1 PATCH: 1 PATCH, EXTENDED RELEASE TRANSDERMAL at 07:28

## 2022-02-25 RX ADMIN — MORPHINE SULFATE 0.5 MILLIGRAM(S): 50 CAPSULE, EXTENDED RELEASE ORAL at 00:34

## 2022-02-25 RX ADMIN — SCOPALAMINE 1 PATCH: 1 PATCH, EXTENDED RELEASE TRANSDERMAL at 00:35

## 2022-02-25 RX ADMIN — CEFEPIME 100 MILLIGRAM(S): 1 INJECTION, POWDER, FOR SOLUTION INTRAMUSCULAR; INTRAVENOUS at 10:02

## 2022-02-25 RX ADMIN — MORPHINE SULFATE 0.5 MILLIGRAM(S): 50 CAPSULE, EXTENDED RELEASE ORAL at 12:32

## 2022-02-25 NOTE — PROGRESS NOTE ADULT - PROBLEM SELECTOR PLAN 9
Patient more awake and alert today and answering questions, but difficult to understand.  Patient with some increased respiratory rate and effort noted on exam.  Would consider low dose opioids for dyspnea relief. Hospice referral pending.  DNR/I on MOLST in chart.      Discussed with Dr. Smith
Patient's mentation waxes and wanes and today is more lethargic.  Oxygen requirements have varied during admission.  At present he is on NRB.  Hospice will accept patient for inpatient care, but can not take patient on current oxygen requirements.  Would need to be titrated down to move to [facility] when approved and bed available. WILLIE on chart with DNR/I     Discussed with Dr. Smith
Patient's mentation waxes and wanes and today is more lethargic.  Oxygen requirements have varied during admission.  At present he is on NRB.  Hospice will accept patient for inpatient care, but can not take patient on current oxygen requirements.  Would need to be titrated down to move to [facility] when approved and bed available. WILLIE on chart with DNR/I     Discussed with Dr. Smith

## 2022-02-25 NOTE — PROGRESS NOTE ADULT - SUBJECTIVE AND OBJECTIVE BOX
INTERVAL HPI/OVERNIGHT EVENTS: Pt seen and examined at bedside.     89y  Vital Signs Last 24 Hrs  T(C): 36.2 (25 Feb 2022 10:45), Max: 36.4 (25 Feb 2022 04:37)  T(F): 97.2 (25 Feb 2022 10:45), Max: 97.6 (25 Feb 2022 04:37)  HR: 59 (25 Feb 2022 16:17) (59 - 81)  BP: 104/53 (25 Feb 2022 16:17) (87/59 - 121/75)  BP(mean): --  RR: 18 (25 Feb 2022 16:17) (18 - 20)  SpO2: 91% (25 Feb 2022 10:45) (90% - 97%)  I&O's Summary    MEDICATIONS  (STANDING):  ALBUTerol    90 MICROgram(s) HFA Inhaler 2 Puff(s) Inhalation every 6 hours  atorvastatin 20 milliGRAM(s) Oral at bedtime  bicalutamide 50 milliGRAM(s) Oral daily  cefepime   IVPB      cefepime   IVPB 2000 milliGRAM(s) IV Intermittent every 24 hours  dextrose 5%. 1000 milliLiter(s) (75 mL/Hr) IV Continuous <Continuous>  morphine  - Injectable 0.5 milliGRAM(s) IV Push every 6 hours  pantoprazole  Injectable 40 milliGRAM(s) IV Push every 12 hours  scopolamine 1 mG/72 Hr(s) Patch 1 Patch Transdermal every 72 hours    MEDICATIONS  (PRN):  morphine  - Injectable 1 milliGRAM(s) IV Push every 3 hours PRN dyspnea or severe pain    LABS:    trop                        9.9    9.16  )-----------( 155      ( 25 Feb 2022 07:40 )             33.1     02-24    143  |  115<H>  |  18  ----------------------------<  98  3.3<L>   |  22  |  0.93    Ca    7.8<L>      24 Feb 2022 04:23          CAPILLARY BLOOD GLUCOSE          ABG - ( 25 Feb 2022 12:33 )  pH, Arterial: x     pH, Blood: 7.38  /  pCO2: 39    /  pO2: 121   / HCO3: 23    / Base Excess: -1.8  /  SaO2: 99.7          RADIOLOGY & ADDITIONAL TESTS:    Imaging Personally Reviewed:  [ ] YES  [ ] NO    Consultant(s) Notes Reviewed:  [x ] YES  [ ] NO    PHYSICAL EXAM:  GENERAL: NAD, on NRB  NERVOUS SYSTEM:  lethargic  CHEST/LUNG: Diminished BS b/l.  HEART: Regular rate and rhythm; No murmurs, rubs, or gallops  ABDOMEN: Soft, Nontender, Nondistended; Bowel sounds present  EXTREMITIES:  No clubbing, cyanosis, or edema      A & P:        Care Discussed with Consultants/Other Providers [ x] YES  [ ] NO

## 2022-02-25 NOTE — PROGRESS NOTE ADULT - SUBJECTIVE AND OBJECTIVE BOX
SUBJECTIVE AND OBJECTIVE: Patient awake and alert not speaking  INTERVAL HPI/OVERNIGHT EVENTS:    DNR on chart: yes  Allergies    No Known Allergies    Intolerances    MEDICATIONS  (STANDING):  ALBUTerol    90 MICROgram(s) HFA Inhaler 2 Puff(s) Inhalation every 6 hours  atorvastatin 20 milliGRAM(s) Oral at bedtime  bicalutamide 50 milliGRAM(s) Oral daily  cefepime   IVPB      cefepime   IVPB 2000 milliGRAM(s) IV Intermittent every 24 hours  dextrose 5%. 1000 milliLiter(s) (75 mL/Hr) IV Continuous <Continuous>  morphine  - Injectable 0.5 milliGRAM(s) IV Push every 6 hours  pantoprazole Infusion 8 mG/Hr (10 mL/Hr) IV Continuous <Continuous>  scopolamine 1 mG/72 Hr(s) Patch 1 Patch Transdermal every 72 hours    MEDICATIONS  (PRN):  morphine  - Injectable 1 milliGRAM(s) IV Push every 3 hours PRN dyspnea or severe pain      ITEMS UNCHECKED ARE NOT PRESENT    PRESENT SYMPTOMS: [x ]Unable to obtain due to poor mentation   Source if other than patient:  [ ]Family   [ ]Team     Pain:  [ ]yes [ ]no  QOL impact -   Location -                    Aggravating factors -  Quality -  Radiation -  Timing-  Severity (0-10 scale):  Minimal acceptable level (0-10 scale):     Dyspnea:                           [ ]Mild [ ]Moderate [ ]Severe  Anxiety:                             [ ]Mild [ ]Moderate [ ]Severe  Fatigue:                             [ ]Mild [ ]Moderate [ ]Severe  Nausea:                             [ ]Mild [ ]Moderate [ ]Severe  Loss of appetite:              [ ]Mild [ ]Moderate [ ]Severe  Constipation:                    [ ]Mild [ ]Moderate [ ]Severe    PAIN AD Score:	3  [email or web address] (Ctrl + left click to view)    Other Symptoms:  [ ]All other review of systems negative     Palliative Performance Status Version 2:      10   %      http://npcrc.org/files/news/palliative_performance_scale_ppsv2.pdf  PHYSICAL EXAM:  Vital Signs Last 24 Hrs  T(C): 36.2 (25 Feb 2022 10:45), Max: 36.4 (25 Feb 2022 04:37)  T(F): 97.2 (25 Feb 2022 10:45), Max: 97.6 (25 Feb 2022 04:37)  HR: 81 (25 Feb 2022 10:45) (65 - 81)  BP: 121/75 (25 Feb 2022 10:45) (87/59 - 121/75)  BP(mean): --  RR: 18 (25 Feb 2022 10:45) (18 - 18)  SpO2: 91% (25 Feb 2022 10:45) (90% - 97%) I&O's Summary     GENERAL:  [x ]Alert  [ ]Oriented x   [ ]Lethargic  [ ]Cachexia  [ ]Unarousable  [ ]Verbal  [ x]Non-Verbal  Behavioral:   [ ]Anxiety  [ ]Delirium [ ]Agitation [ ]Other  HEENT:  [ ]Normal   [x ]Dry mouth   [ ]ET Tube/Trach  [ ]Oral lesions  PULMONARY:   [ ]Clear [ ]Tachypnea  [ ]Audible excessive secretions   [ ]Rhonchi        [ ]Right [ ]Left [ ]Bilateral  [ ]Crackles        [ ]Right [ ]Left [ ]Bilateral  [ ]Wheezing     [ ]Right [ ]Left [ ]Bilateral  [ ]Diminished BS [ ] Right [ ]Left [ ]Bilateral  CARDIOVASCULAR:    [x ]Regular [ ]Irregular [ ]Tachy  [ ]Rudolph [ ]Murmur [ ]Other  GASTROINTESTINAL:  [ x]Soft  [ ]Distended   [ ]+BS  [ ]Non tender [ ]Tender  [ ]PEG [ ]OGT/ NGT   Last BM:  2/23/22  GENITOURINARY:  [ ]Normal [x ]Incontinent   [ ]Oliguria/Anuria   [ ]Lutz  MUSCULOSKELETAL:   [ ]Normal   [ ]Weakness  [x ]Bed/Wheelchair bound [ ]Edema  NEUROLOGIC:   [ ]No focal deficits  [x ] Cognitive impairment  [ ] Dysphagia [ ]Dysarthria [ ] Paresis [ ]Other   SKIN:   [ ]Normal  [ ]Rash   [ x]Pressure ulcer(s) right great toe unstageable, right heel unstageable, sacrum healed [ ]y [ ]n present on admission    CRITICAL CARE:  [ ]Shock Present  [ ]Septic [ ]Cardiogenic [ ]Neurologic [ ]Hypovolemic  [ ]Vasopressors [ ]Inotropes  [ ]Respiratory failure present [ ]Mechanical Ventilation [ ]Non-invasive ventilatory support [ ]High-Flow  [ ]Acute  [ ]Chronic [ ]Hypoxic  [ ]Hypercarbic [ ]Other  [ ]Other organ failure     LABS:                        9.9    9.16  )-----------( 155      ( 25 Feb 2022 07:40 )             33.1   02-24    143  |  115<H>  |  18  ----------------------------<  98  3.3<L>   |  22  |  0.93    Ca    7.8<L>      24 Feb 2022 04:23      RADIOLOGY & ADDITIONAL STUDIES: none new    Protein Calorie Malnutrition Present: [ ]mild [ ]moderate [ x]severe [ ]underweight [ ]morbid obesity  https://www.andeal.org/vault/2440/web/files/ONC/Table_Clinical%20Characteristics%20to%20Document%20Malnutrition-White%20JV%20et%20al%202012.pdf    Height (cm): 167.6 (02-13-22 @ 17:02), 167.6 (09-27-21 @ 11:29), 167.6 (04-12-21 @ 12:23)  Weight (kg): 44.4 (02-14-22 @ 01:14), 63.5 (09-27-21 @ 11:29), 40.8 (04-12-21 @ 12:23)  BMI (kg/m2): 15.8 (02-14-22 @ 01:14), 22.6 (02-13-22 @ 17:02), 22.6 (09-27-21 @ 11:29)    [x ]PPSV2 < or = 30%  [ ]significant weight loss [ x]poor nutritional intake [ ]anasarca    [ ]Artificial Nutrition    REFERRALS:   [ ]Chaplaincy  [ ]Hospice  [ ]Child Life  [ ]Social Work  [ ]Case management [ ]Holistic Therapy     Goals of Care Document:

## 2022-02-25 NOTE — PROGRESS NOTE ADULT - PROBLEM SELECTOR PROBLEM 3
Pneumonia
Pneumonia
Adult failure to thrive
Pneumonia
Pneumonia
Acitretin Counseling:  I discussed with the patient the risks of acitretin including but not limited to hair loss, dry lips/skin/eyes, liver damage, hyperlipidemia, depression/suicidal ideation, photosensitivity.  Serious rare side effects can include but are not limited to pancreatitis, pseudotumor cerebri, bony changes, clot formation/stroke/heart attack.  Patient understands that alcohol is contraindicated since it can result in liver toxicity and significantly prolong the elimination of the drug by many years.

## 2022-02-25 NOTE — PROGRESS NOTE ADULT - PROBLEM SELECTOR PLAN 7
diagnosed and treated 20 years ago  on bicalutamide
Patient continues on NRB and overall debilitated state.  He passed swallow eval and diet started today.  MOLST with DNR/I on chart.
bedbound since the summer per discussion with patient's son  dependent for care

## 2022-02-25 NOTE — PROGRESS NOTE ADULT - PROBLEM SELECTOR PROBLEM 6
Fecal occult blood test positive
Functional quadriplegia
Fecal occult blood test positive
Fecal occult blood test positive
Prostate cancer

## 2022-02-25 NOTE — PROGRESS NOTE ADULT - PROBLEM SELECTOR PLAN 3
CT Chest: Increasing right pleural effusion with new complete right middle and   lower lobe atelectasis and increasing opacification of the right  upper lobe.  Cannot exclude underlying pneumonia.  New left upper lobe pneumonia.  CT surgery consult appreciated  on abx  with percussion vest  doing poorly
passed speech and swallow-diet ordered  hypokalemic-likely from poor po intake  on maintenance IVF  severe spinal curvature which makes maintenance for safe positioning more difficult and could put patient at increased risk for aspiration.
CT Chest: Increasing right pleural effusion with new complete right middle and   lower lobe atelectasis and increasing opacification of the right  upper lobe.  Cannot exclude underlying pneumonia.  New left upper lobe pneumonia.  CT surgery consult appreciated  on abx  with percussion vest  doing poorly
CT Chest: Increasing right pleural effusion with new complete right middle and   lower lobe atelectasis and increasing opacification of the right  upper lobe.  Cannot exclude underlying pneumonia.  New left upper lobe pneumonia.  CT surgery consult appreciated  on abx  with percussion vest  doing poorly
CT Chest: Increasing right pleural effusion with new complete right middle and   lower lobe atelectasis and increasing opacification of the right  upper lobe.  Cannot exclude underlying pneumonia.  New left upper lobe pneumonia.  CT surgery consult appreciated  on abx  with percussion vest  morphine ATC and PRN for dyspnea  doing poorly

## 2022-02-25 NOTE — PROGRESS NOTE ADULT - PROBLEM SELECTOR PLAN 1
on NRB-takes it off at times  consider titrating oxygen down  on lovenox  morphine ATC and PRN for dyspnea on NRB-takes it off at times  consider titrating oxygen down  lovenox off-rectal bleed with drop in hemogobin  morphine ATC and PRN for dyspnea on NRB-takes it off at times  consider titrating oxygen down  lovenox off-bloody stools and positive FOBT with drop in hemogobin  morphine ATC and PRN for dyspnea

## 2022-02-25 NOTE — PROGRESS NOTE ADULT - PROBLEM SELECTOR PROBLEM 4
Adult failure to thrive
AMNA (acute kidney injury)
Adult failure to thrive

## 2022-02-25 NOTE — PROGRESS NOTE ADULT - PROBLEM SELECTOR PROBLEM 1
Acute pulmonary embolus

## 2022-02-25 NOTE — PROGRESS NOTE ADULT - SUBJECTIVE AND OBJECTIVE BOX
INTERVAL HPI:   89M with HTN, CHF?, Prostate Ca who presents to the ED for dyspnea, wheezing,  wet cough, and decreased appetite for two weeks.  Found to have PE, Pneumonia, Dehydration, hypernatremia and Renal insuffiencey. Blood cultures are growing staph Aureus.  Not able to provide more details.  Started on IV heparin, IV fluids, NRB mask and antibiotics.    OVERNIGHT EVENTS:  Does not keep NRB mask on.     Vital Signs Last 24 Hrs  T(C): 36.2 (25 Feb 2022 10:45), Max: 36.4 (25 Feb 2022 04:37)  T(F): 97.2 (25 Feb 2022 10:45), Max: 97.6 (25 Feb 2022 04:37)  HR: 81 (25 Feb 2022 13:33) (65 - 81)  BP: 121/75 (25 Feb 2022 10:45) (87/59 - 121/75)  BP(mean): --  RR: 20 (25 Feb 2022 13:33) (18 - 20)  SpO2: 91% (25 Feb 2022 10:45) (90% - 97%)    PHYSICAL EXAM:  GEN:         Awake, responsive and comfortable.  HEENT:    Normal.    RESP:       decreased air entry  CVS:         Regular rate and rhythm.   ABD:         Soft, non-tender, non-distended;     MEDICATIONS  (STANDING):  ALBUTerol    90 MICROgram(s) HFA Inhaler 2 Puff(s) Inhalation every 6 hours  atorvastatin 20 milliGRAM(s) Oral at bedtime  bicalutamide 50 milliGRAM(s) Oral daily  cefepime   IVPB      cefepime   IVPB 2000 milliGRAM(s) IV Intermittent every 24 hours  dextrose 5%. 1000 milliLiter(s) (75 mL/Hr) IV Continuous <Continuous>  morphine  - Injectable 0.5 milliGRAM(s) IV Push every 6 hours  pantoprazole Infusion 8 mG/Hr (10 mL/Hr) IV Continuous <Continuous>  scopolamine 1 mG/72 Hr(s) Patch 1 Patch Transdermal every 72 hours    MEDICATIONS  (PRN):  morphine  - Injectable 1 milliGRAM(s) IV Push every 3 hours PRN dyspnea or severe pain    LABS:                        9.9    9.16  )-----------( 155      ( 25 Feb 2022 07:40 )             33.1     02-24    143  |  115<H>  |  18  ----------------------------<  98  3.3<L>   |  22  |  0.93    Ca    7.8<L>      24 Feb 2022 04:23    02-25 @ 12:33  pH: 7.38  pCO2: 39  pO2: 121  SaO2: 99.7    ASSESSMENT AND PLAN:  ·	Acute PE.  ·	RLL pneumonia.  ·	Staph Aureus bacteremia.  ·	Dehydration.  ·	Hypernatremia.  ·	Renal Insuffiencey  ·	Prostate CA.  ·	HTN.  ·	RUL+ RML atelectasis.  ·	Right pleural effusion.    SPO2 not picked up from fingers. not even from fore head.  ABG show good oxygenation.  Will change to salter style green nasal O2.  On chest vest for airway clearance  Antibiotics per ID.  Albuterol MDI  Prognosis guarded.

## 2022-02-25 NOTE — PROGRESS NOTE ADULT - PROBLEM SELECTOR PLAN 6
positive FOBT, multiple bloody BMs 2 days ago  started on protonix gtt  hemoglobin lower than prior days-stable from yesterday  on lovenox for PE-monitor for s/s of bleeding positive FOBT, multiple bloody BMs 2 days ago  started on protonix gtt  hemoglobin lower than prior days-stable from yesterday  monitor for s/s of bleeding

## 2022-02-25 NOTE — PROGRESS NOTE ADULT - TIME BILLING
evaluation of patient, review of medical records, collaboration with care teams
evaluation of patient, review of medical records and collaboration with medical team
Evaluation of patient, review of medical records and collaboration with care teams
evaluation of patient, review of medical records and collaboration with medical team
Evalution of patient, review of medical records and collaboration with care teams

## 2022-02-25 NOTE — PROGRESS NOTE ADULT - PROBLEM SELECTOR PLAN 4
passed speech and swallow-diet ordered  hypokalemic-likely from poor po intake  hypernatremic and hyperchloremic   on maintenance IVF-D5%  severe spinal curvature which makes maintenance for safe positioning more difficult and could put patient at increased risk for aspiration.
passed speech and swallow-diet ordered  hypokalemic-likely from poor po intake  hypernatremic and hyperchloremic   on maintenance IVF-D5%  severe spinal curvature which makes maintenance for safe positioning more difficult and could put patient at increased risk for aspiration.
passed speech and swallow-diet ordered  hypokalemic-likely from poor po intake  hyperchloremic   on maintenance IVF-D5%  severe spinal curvature which makes maintenance for safe positioning more difficult and could put patient at increased risk for aspiration.
improved
passed speech and swallow-diet ordered  hypokalemic-likely from poor po intake  hypernatremic and hyperchloremic   on maintenance IVF-D5%  severe spinal curvature which makes maintenance for safe positioning more difficult and could put patient at increased risk for aspiration.

## 2022-02-25 NOTE — PROGRESS NOTE ADULT - ASSESSMENT
a 89M with a PMH of CHF?, prostate Ca who presents to the ED for dyspnea.  Patient currently AAOx1, unable to provide history.  Patient reportedly noted to have decreased appetite and wheezing by his HHA today who called EMS.  No current complaints.  SpO2 currently 96 on 4L via NC.  Vitals stable, labs show mild leukocytosis.  Will admit to tele.      Metabolic encephalopathy.    Hx of failure to thrive   - Unclear baseline mental status.  Patient presents with hypothermia,   As per son- was congested/ not eating much   Usually pt is bedbound on a wheelchair for most of the day-mainly due to general weakness for over more than 6months, has dementia  Pt's son refused NGT at this time.    Bacteremia  one blood culture from 2/14 growing staph aureus  Repeat blood cultures negative  CXR performed on 2/18- showing Complete opacification of RIGHT hemithorax.   -reviewed CT of chest/abd/pelvis   -c/w cefepime    Acute pulmonary embolus.  RLL Pneumonia.   Mucous plug  Underlying COPD  CTA-Pulmonary embolism in the segmental right lower lobe  switched heparin drip to lovenox (theraputic dose) since pt agitated and not easy stick  Will need to switch to a DOAC but will need to estimate risks/vs benefits given hx of gastric ulcers and hx of bleeding in past  -continue antibiotics  -continue respiratory suctioning, chest vest  -appreciate thoracic surgery-no bronch or surgery given high risk    -Pulm following pt  -Pallative on board as well      Troponin level elevated.   - Troponin elevated-most likely demand ischemia  No current chest pain.   As per Cardiology - "no concomitant evidence of acute ischemia clinically or on ECG - likely type II MI in setting of CAP/PE."    Chronic CHF.   - spironolactone (on hold)   Holding Lasix and aldactone now in setting of hypernatremia and dehydration   -Received two doses of IV 40mg Lasix due to congestion on 2/18  ASA relatively contraindicated due to recurrent GI bleed in the past  Bbl relatively contraindicated due to emphysema, smoking history  will restart on Statin   ECHO reviewed- EF preserved     AMNA (acute kidney injury).   -monitor    Hypernatremia.   -resolved  Hypokalemia  -continue to supplement and monitor     Prostate cancer.   ·  Plan: bicalutamide.    Right hallux and heal wounds   -continue wound care  -follow up with Podiatry   -xray no acute findings     ? GIB  + ?melena  Hb 12-->9.  Monitor Hb  c/w protonix   He refused EGD.    dvtppx- hold Lovenox sec to GIB  SCD    Son- Eren- [phone]---updated the plan,   [facility] signed- pt is DNR/DNI  Spoke with Son who wants his father to be comfortable, Hospice care eval Pending.    Hospice will accept patient for in patient care, if his O2 is aroung 6l, will try to titrate down and monitor.

## 2022-02-25 NOTE — PROGRESS NOTE ADULT - PROBLEM SELECTOR PLAN 5
bedbound since the summer per discussion with patient's son  dependent for care
improved

## 2022-02-25 NOTE — PROGRESS NOTE ADULT - PROBLEM SELECTOR PLAN 2
on cefazolin 2g  per ID would need to remain on abx till 3/16/22 if it aligned with wishes of family  lactate trended and improved but elevated
on cefazolin 2g  lactate trended and improved but elevated
on cefazolin 2g  lactate remains elevated
on cefazolin 2g  per ID would need to remain on abx till 3/16/22 if it aligned with wishes of family  lactate trended and improved but elevated
on cefazolin 2g  lactate continues on downward trend

## 2022-02-25 NOTE — PROGRESS NOTE ADULT - PROBLEM SELECTOR PLAN 8
diagnosed and treated 20 years ago  on bicalutamide
Patient more lethargic, not speaking during exam today.  Patient was noted to be hypoxic by RN today and placed back on NRB.  Patient with worsening CT pulmonary findings and is overall doing poorly.  Hospice consult placed yesterday and spoke with [facility] referrals today to evaluate patient for hospice.  Patient has DNR/I on [facility]OLST in chart.    Discussed with NP Dominga
diagnosed and treated 20 years ago  on bicalutamide
diagnosed and treated 20 years ago  on bicalutamide

## 2022-02-25 NOTE — PROGRESS NOTE ADULT - PROBLEM SELECTOR PROBLEM 7
Functional quadriplegia
Functional quadriplegia
Prostate cancer
Encounter for palliative care
Functional quadriplegia

## 2022-02-25 NOTE — PROGRESS NOTE ADULT - PROBLEM SELECTOR PROBLEM 5
AMNA (acute kidney injury)
Functional quadriplegia
AMNA (acute kidney injury)

## 2022-02-26 LAB
FLUAV AG NPH QL: SIGNIFICANT CHANGE UP
FLUBV AG NPH QL: SIGNIFICANT CHANGE UP
HCT VFR BLD CALC: 29.1 % — LOW (ref 39–50)
HGB BLD-MCNC: 8.9 G/DL — LOW (ref 13–17)
MCHC RBC-ENTMCNC: 27.4 PG — SIGNIFICANT CHANGE UP (ref 27–34)
MCHC RBC-ENTMCNC: 30.6 G/DL — LOW (ref 32–36)
MCV RBC AUTO: 89.5 FL — SIGNIFICANT CHANGE UP (ref 80–100)
NRBC # BLD: 0 /100 WBCS — SIGNIFICANT CHANGE UP (ref 0–0)
PLATELET # BLD AUTO: 158 K/UL — SIGNIFICANT CHANGE UP (ref 150–400)
RBC # BLD: 3.25 M/UL — LOW (ref 4.2–5.8)
RBC # FLD: 16.7 % — HIGH (ref 10.3–14.5)
SARS-COV-2 RNA SPEC QL NAA+PROBE: SIGNIFICANT CHANGE UP
WBC # BLD: 8.57 K/UL — SIGNIFICANT CHANGE UP (ref 3.8–10.5)
WBC # FLD AUTO: 8.57 K/UL — SIGNIFICANT CHANGE UP (ref 3.8–10.5)

## 2022-02-26 PROCEDURE — 99233 SBSQ HOSP IP/OBS HIGH 50: CPT

## 2022-02-26 RX ADMIN — CEFEPIME 100 MILLIGRAM(S): 1 INJECTION, POWDER, FOR SOLUTION INTRAMUSCULAR; INTRAVENOUS at 09:46

## 2022-02-26 RX ADMIN — ATORVASTATIN CALCIUM 20 MILLIGRAM(S): 80 TABLET, FILM COATED ORAL at 21:00

## 2022-02-26 RX ADMIN — SCOPALAMINE 1 PATCH: 1 PATCH, EXTENDED RELEASE TRANSDERMAL at 23:52

## 2022-02-26 RX ADMIN — MORPHINE SULFATE 0.5 MILLIGRAM(S): 50 CAPSULE, EXTENDED RELEASE ORAL at 23:54

## 2022-02-26 RX ADMIN — BICALUTAMIDE 50 MILLIGRAM(S): 50 TABLET, FILM COATED ORAL at 12:10

## 2022-02-26 RX ADMIN — MORPHINE SULFATE 0.5 MILLIGRAM(S): 50 CAPSULE, EXTENDED RELEASE ORAL at 00:06

## 2022-02-26 RX ADMIN — MORPHINE SULFATE 0.5 MILLIGRAM(S): 50 CAPSULE, EXTENDED RELEASE ORAL at 17:32

## 2022-02-26 RX ADMIN — ALBUTEROL 2 PUFF(S): 90 AEROSOL, METERED ORAL at 23:53

## 2022-02-26 RX ADMIN — MORPHINE SULFATE 0.5 MILLIGRAM(S): 50 CAPSULE, EXTENDED RELEASE ORAL at 06:55

## 2022-02-26 RX ADMIN — PANTOPRAZOLE SODIUM 40 MILLIGRAM(S): 20 TABLET, DELAYED RELEASE ORAL at 17:32

## 2022-02-26 RX ADMIN — PANTOPRAZOLE SODIUM 40 MILLIGRAM(S): 20 TABLET, DELAYED RELEASE ORAL at 06:55

## 2022-02-26 RX ADMIN — SODIUM CHLORIDE 75 MILLILITER(S): 9 INJECTION, SOLUTION INTRAVENOUS at 03:00

## 2022-02-26 RX ADMIN — SCOPALAMINE 1 PATCH: 1 PATCH, EXTENDED RELEASE TRANSDERMAL at 07:54

## 2022-02-26 RX ADMIN — ALBUTEROL 2 PUFF(S): 90 AEROSOL, METERED ORAL at 06:56

## 2022-02-26 RX ADMIN — SODIUM CHLORIDE 75 MILLILITER(S): 9 INJECTION, SOLUTION INTRAVENOUS at 17:32

## 2022-02-26 RX ADMIN — MORPHINE SULFATE 0.5 MILLIGRAM(S): 50 CAPSULE, EXTENDED RELEASE ORAL at 13:04

## 2022-02-26 RX ADMIN — MORPHINE SULFATE 0.5 MILLIGRAM(S): 50 CAPSULE, EXTENDED RELEASE ORAL at 18:23

## 2022-02-26 RX ADMIN — MORPHINE SULFATE 0.5 MILLIGRAM(S): 50 CAPSULE, EXTENDED RELEASE ORAL at 12:10

## 2022-02-26 RX ADMIN — MORPHINE SULFATE 0.5 MILLIGRAM(S): 50 CAPSULE, EXTENDED RELEASE ORAL at 06:57

## 2022-02-26 NOTE — PROGRESS NOTE ADULT - SUBJECTIVE AND OBJECTIVE BOX
Resting in bed NAD. afebrile hemodynamically stable. on 12 L NRB.  no acute events.   PHYSICAL EXAM:  GENERAL: NAD, on NRB  NERVOUS SYSTEM:  lethargic  CHEST/LUNG: Diminished BS b/l.  HEART: Regular rate and rhythm; No murmurs, rubs, or gallops  ABDOMEN: Soft, Nontender, Nondistended; Bowel sounds present  EXTREMITIES:  No clubbing, cyanosis, or edema

## 2022-02-26 NOTE — PROGRESS NOTE ADULT - SUBJECTIVE AND OBJECTIVE BOX
INTERVAL HPI:    89M with HTN, CHF?, Prostate Ca who presents to the ED for dyspnea, wheezing,  wet cough, and decreased appetite for two weeks.  Found to have PE, Pneumonia, Dehydration, hypernatremia and Renal insuffiencey. Blood cultures are growing staph Aureus.  Not able to provide more details.  Started on IV heparin, IV fluids, NRB mask and antibiotics.    OVERNIGHT EVENTS:  Tolerating nasal O2.    Vital Signs Last 24 Hrs  T(C): 36.3 (26 Feb 2022 11:07), Max: 36.4 (26 Feb 2022 04:52)  T(F): 97.4 (26 Feb 2022 11:07), Max: 97.6 (26 Feb 2022 04:52)  HR: 90 (26 Feb 2022 11:07) (59 - 90)  BP: 123/75 (26 Feb 2022 11:07) (101/67 - 123/75)  BP(mean): --  RR: 18 (26 Feb 2022 11:07) (18 - 20)  SpO2: 98% (26 Feb 2022 11:07) (98% - 100%)    PHYSICAL EXAM:  GEN:        responsive and comfortable.  HEENT:    Normal.    RESP:      decreased air entry.  CVS:          Regular rate and rhythm.     MEDICATIONS  (STANDING):  ALBUTerol    90 MICROgram(s) HFA Inhaler 2 Puff(s) Inhalation every 6 hours  atorvastatin 20 milliGRAM(s) Oral at bedtime  bicalutamide 50 milliGRAM(s) Oral daily  cefepime   IVPB 2000 milliGRAM(s) IV Intermittent every 24 hours  cefepime   IVPB      dextrose 5%. 1000 milliLiter(s) (75 mL/Hr) IV Continuous <Continuous>  morphine  - Injectable 0.5 milliGRAM(s) IV Push every 6 hours  pantoprazole  Injectable 40 milliGRAM(s) IV Push every 12 hours  scopolamine 1 mG/72 Hr(s) Patch 1 Patch Transdermal every 72 hours    MEDICATIONS  (PRN):  morphine  - Injectable 1 milliGRAM(s) IV Push every 3 hours PRN dyspnea or severe pain    LABS:                        8.9    8.57  )-----------( 158      ( 26 Feb 2022 06:29 )             29.1     02-25 @ 12:33  pH: 7.38  pCO2: 39  pO2: 121  SaO2: 99.7    ASSESSMENT AND PLAN:  ·	Acute PE.  ·	RLL pneumonia.  ·	Staph Aureus bacteremia.  ·	Dehydration.  ·	Hypernatremia.  ·	Renal Insuffiencey  ·	Prostate CA.  ·	HTN.  ·	RUL+ RML atelectasis.  ·	Right pleural effusion.    Has been tolerating nasal O2.  on chest vest.  antibiotics per ID.  Continue Albuterol.

## 2022-02-26 NOTE — PROGRESS NOTE ADULT - ASSESSMENT
89M with a PMH of CHF?, prostate Ca who presents to the ED for dyspnea.  Patient currently AAOx1, unable to provide history.  Patient reportedly noted to have decreased appetite and wheezing by his HHA today who called EMS.  No current complaints.  SpO2 currently 96 on 4L via NC.  Vitals stable, labs show mild leukocytosis.  Will admit to tele.      Metabolic encephalopathy.   failure to thrive/cachexia   MSSA Bacteremia  Acute pulmonary embolus.  RLL Pneumonia.   Mucous plug/R hemithorax opacification   Underlying COPD  Troponin level elevated.  CHF   AMNA (acute kidney injury).   Hypernatremia resolved  Hypokalemia  Prostate cancer.   Right hallux and heal wounds   gib  -awaiting hospice eval. DNR DNI signed by son  -Unclear baseline mental status. bedbound on a wheelchair for most of the day. Pt's son refused NGT for nutrition   -blood culture from 2/14 growing staph aureus, repeat culture (-) c/w cefepime  -segmental PE right lower lobe not on AC due to gib   -continue respiratory suctioning, chest vest  -troponemia type II demand. CP free  -Holding Lasix and aldactone now in setting of hypernatremia and dehydration . cw statin. holding asa due to gib and dropping hgb   -ECHO reviewed- EF preserved   -replete lytes prn    -wound care. follow up with Podiatry   -monitor hgb c/w protonix son refused EGD.    Stephane Jason- [phone]  Hospice will accept patient for in patient care, if his O2 is aroung 6l, will try to titrate down and monitor.   89M with a PMH of CHF?, prostate Ca who presents to the ED for dyspnea.  Patient currently AAOx1, unable to provide history.  Patient reportedly noted to have decreased appetite and wheezing by his HHA today who called EMS.  No current complaints.  SpO2 currently 96 on 4L via NC.  Vitals stable, labs show mild leukocytosis.  Will admit to tele.      Metabolic encephalopathy.   failure to thrive/cachexia   MSSA Bacteremia  Acute pulmonary embolus.  RLL Pneumonia.   Mucous plug/R hemithorax opacification   Underlying COPD  Troponin level elevated.  CHF   AMNA (acute kidney injury).   Hypernatremia resolved  Hypokalemia  Prostate cancer.   Right hallux and heal wounds   gib  -awaiting hospice eval. DNR DNI signed by son. cw morphine   -Unclear baseline mental status. bedbound on a wheelchair for most of the day. Pt's son refused NGT for nutrition   -blood culture from 2/14 growing staph aureus, repeat culture (-) c/w cefepime  -segmental PE right lower lobe not on AC due to gib   -continue respiratory suctioning, chest vest  -troponemia type II demand. CP free  -Holding Lasix and aldactone now in setting of hypernatremia and dehydration . cw statin. holding asa due to gib and dropping hgb   -ECHO reviewed- EF preserved   -replete lytes prn    -wound care. follow up with Podiatry   -monitor hgb c/w protonix son refused EGD.    Stephane Jason- [phone]  Hospice will accept patient for in patient care, if his O2 is aroung 6l, will try to titrate down and monitor.

## 2022-02-27 LAB
HCT VFR BLD CALC: 30.1 % — LOW (ref 39–50)
HGB BLD-MCNC: 9.1 G/DL — LOW (ref 13–17)
MCHC RBC-ENTMCNC: 27.2 PG — SIGNIFICANT CHANGE UP (ref 27–34)
MCHC RBC-ENTMCNC: 30.2 G/DL — LOW (ref 32–36)
MCV RBC AUTO: 90.1 FL — SIGNIFICANT CHANGE UP (ref 80–100)
NRBC # BLD: 0 /100 WBCS — SIGNIFICANT CHANGE UP (ref 0–0)
PLATELET # BLD AUTO: 160 K/UL — SIGNIFICANT CHANGE UP (ref 150–400)
RBC # BLD: 3.34 M/UL — LOW (ref 4.2–5.8)
RBC # FLD: 17.2 % — HIGH (ref 10.3–14.5)
WBC # BLD: 8.68 K/UL — SIGNIFICANT CHANGE UP (ref 3.8–10.5)
WBC # FLD AUTO: 8.68 K/UL — SIGNIFICANT CHANGE UP (ref 3.8–10.5)

## 2022-02-27 PROCEDURE — 99233 SBSQ HOSP IP/OBS HIGH 50: CPT

## 2022-02-27 RX ADMIN — ALBUTEROL 2 PUFF(S): 90 AEROSOL, METERED ORAL at 05:02

## 2022-02-27 RX ADMIN — CEFEPIME 100 MILLIGRAM(S): 1 INJECTION, POWDER, FOR SOLUTION INTRAMUSCULAR; INTRAVENOUS at 09:59

## 2022-02-27 RX ADMIN — ALBUTEROL 2 PUFF(S): 90 AEROSOL, METERED ORAL at 23:45

## 2022-02-27 RX ADMIN — SCOPALAMINE 1 PATCH: 1 PATCH, EXTENDED RELEASE TRANSDERMAL at 23:59

## 2022-02-27 RX ADMIN — MORPHINE SULFATE 0.5 MILLIGRAM(S): 50 CAPSULE, EXTENDED RELEASE ORAL at 12:17

## 2022-02-27 RX ADMIN — MORPHINE SULFATE 0.5 MILLIGRAM(S): 50 CAPSULE, EXTENDED RELEASE ORAL at 17:45

## 2022-02-27 RX ADMIN — PANTOPRAZOLE SODIUM 40 MILLIGRAM(S): 20 TABLET, DELAYED RELEASE ORAL at 17:45

## 2022-02-27 RX ADMIN — MORPHINE SULFATE 0.5 MILLIGRAM(S): 50 CAPSULE, EXTENDED RELEASE ORAL at 05:01

## 2022-02-27 RX ADMIN — SODIUM CHLORIDE 75 MILLILITER(S): 9 INJECTION, SOLUTION INTRAVENOUS at 13:52

## 2022-02-27 RX ADMIN — BICALUTAMIDE 50 MILLIGRAM(S): 50 TABLET, FILM COATED ORAL at 12:18

## 2022-02-27 RX ADMIN — MORPHINE SULFATE 0.5 MILLIGRAM(S): 50 CAPSULE, EXTENDED RELEASE ORAL at 23:59

## 2022-02-27 RX ADMIN — MORPHINE SULFATE 0.5 MILLIGRAM(S): 50 CAPSULE, EXTENDED RELEASE ORAL at 23:37

## 2022-02-27 RX ADMIN — MORPHINE SULFATE 0.5 MILLIGRAM(S): 50 CAPSULE, EXTENDED RELEASE ORAL at 05:02

## 2022-02-27 RX ADMIN — ATORVASTATIN CALCIUM 20 MILLIGRAM(S): 80 TABLET, FILM COATED ORAL at 22:03

## 2022-02-27 RX ADMIN — SODIUM CHLORIDE 75 MILLILITER(S): 9 INJECTION, SOLUTION INTRAVENOUS at 23:58

## 2022-02-27 RX ADMIN — MORPHINE SULFATE 0.5 MILLIGRAM(S): 50 CAPSULE, EXTENDED RELEASE ORAL at 12:40

## 2022-02-27 RX ADMIN — SCOPALAMINE 1 PATCH: 1 PATCH, EXTENDED RELEASE TRANSDERMAL at 23:38

## 2022-02-27 RX ADMIN — ALBUTEROL 2 PUFF(S): 90 AEROSOL, METERED ORAL at 12:18

## 2022-02-27 RX ADMIN — SCOPALAMINE 1 PATCH: 1 PATCH, EXTENDED RELEASE TRANSDERMAL at 07:38

## 2022-02-27 RX ADMIN — PANTOPRAZOLE SODIUM 40 MILLIGRAM(S): 20 TABLET, DELAYED RELEASE ORAL at 05:01

## 2022-02-27 RX ADMIN — ALBUTEROL 2 PUFF(S): 90 AEROSOL, METERED ORAL at 17:45

## 2022-02-27 RX ADMIN — MORPHINE SULFATE 0.5 MILLIGRAM(S): 50 CAPSULE, EXTENDED RELEASE ORAL at 18:15

## 2022-02-27 NOTE — PROGRESS NOTE ADULT - SUBJECTIVE AND OBJECTIVE BOX
INTERVAL HPI:   89M with HTN, CHF?, Prostate Ca who presents to the ED for dyspnea, wheezing,  wet cough, and decreased appetite for two weeks.  Found to have PE, Pneumonia, Dehydration, hypernatremia and Renal insuffiencey. Blood cultures are growing staph Aureus.  Not able to provide more details.  Started on IV heparin, IV fluids, NRB mask and antibiotics.    OVERNIGHT EVENTS:  Now with hypothermia 94.6, started on warming blanket    Vital Signs Last 24 Hrs  T(C): 34.9 (27 Feb 2022 17:22), Max: 36.6 (27 Feb 2022 00:20)  T(F): 94.8 (27 Feb 2022 17:22), Max: 97.8 (27 Feb 2022 00:20)  HR: 63 (27 Feb 2022 17:22) (63 - 88)  BP: 91/58 (27 Feb 2022 17:22) (91/58 - 146/56)  BP(mean): --  RR: 18 (27 Feb 2022 17:22) (18 - 18)  SpO2: 100% (27 Feb 2022 17:22) (98% - 100%)    PHYSICAL EXAM:  GEN:         responsive and comfortable.  HEENT:    Normal.    RESP:       no wheezing.  CVS:         Regular rate and rhythm.     MEDICATIONS  (STANDING):  ALBUTerol    90 MICROgram(s) HFA Inhaler 2 Puff(s) Inhalation every 6 hours  atorvastatin 20 milliGRAM(s) Oral at bedtime  bicalutamide 50 milliGRAM(s) Oral daily  cefepime   IVPB 2000 milliGRAM(s) IV Intermittent every 24 hours  cefepime   IVPB      dextrose 5%. 1000 milliLiter(s) (75 mL/Hr) IV Continuous <Continuous>  morphine  - Injectable 0.5 milliGRAM(s) IV Push every 6 hours  pantoprazole  Injectable 40 milliGRAM(s) IV Push every 12 hours  scopolamine 1 mG/72 Hr(s) Patch 1 Patch Transdermal every 72 hours    MEDICATIONS  (PRN):  morphine  - Injectable 1 milliGRAM(s) IV Push every 3 hours PRN dyspnea or severe pain    LABS:                        9.1    8.68  )-----------( 160      ( 27 Feb 2022 14:40 )             30.1     02-25 @ 12:33  pH: 7.38  pCO2: 39  pO2: 121  SaO2: 99.7    ASSESSMENT AND PLAN:  ·	Acute PE.  ·	RLL pneumonia.  ·	Staph Aureus bacteremia.  ·	Dehydration.  ·	Hypernatremia.  ·	Renal Insuffiencey  ·	Prostate CA.  ·	HTN.  ·	RUL+ RML atelectasis.  ·	Right pleural effusion.    Now with hypothermia, temperature of 94.6, started on warming blanket.  On antibiotics per ID.  SPO2 100% on nasal O2.   SPO2 from ear, as not picked up from fingers.  Continue Albuterol.  On chest vest for percussion.  Also on scopolamine patch.  Prognosis guarded.

## 2022-02-27 NOTE — PROGRESS NOTE ADULT - SUBJECTIVE AND OBJECTIVE BOX
Resting in bed NAD. afebrile hemodynamically stable. on NC.  no acute events.   PHYSICAL EXAM:  GENERAL: NAD, on NRB  NERVOUS SYSTEM:  lethargic  CHEST/LUNG: Diminished BS b/l.  HEART: Regular rate and rhythm; No murmurs, rubs, or gallops  ABDOMEN: Soft, Nontender, Nondistended; Bowel sounds present  EXTREMITIES:  No clubbing, cyanosis, or edema

## 2022-02-27 NOTE — PROGRESS NOTE ADULT - ASSESSMENT
89M with a PMH of CHF?, prostate Ca who presents to the ED for dyspnea.  Patient currently AAOx1, unable to provide history.  Patient reportedly noted to have decreased appetite and wheezing by his HHA today who called EMS.  No current complaints.  SpO2 currently 96 on 4L via NC.  Vitals stable, labs show mild leukocytosis.  Will admit to tele.      Metabolic encephalopathy.   failure to thrive/cachexia   MSSA Bacteremia  Acute pulmonary embolus.  RLL Pneumonia.   Mucous plug/R hemithorax opacification   Underlying COPD  Troponin level elevated.  CHF   AMNA (acute kidney injury).   Hypernatremia resolved  Hypokalemia  Prostate cancer.   Right hallux and heal wounds   gib  -awaiting hospice eval. DNR DNI signed by son. cw morphine   -Unclear baseline mental status. bedbound on a wheelchair for most of the day. Pt's son refused NGT for nutrition   -blood culture from 2/14 growing staph aureus, repeat culture (-) c/w cefepime  -segmental PE right lower lobe not on AC due to gib. Pulm consult appreciated  -continue respiratory suctioning, chest vest  -cw abx  -troponemia type II demand. CP free  -Holding Lasix and aldactone now in setting of hypernatremia and dehydration . cw statin. holding asa due to gib and dropping hgb   -ECHO reviewed- EF preserved   -replete lytes prn    -wound care. follow up with Podiatry   -monitor hgb c/w protonix son refused EGD.    Stephane Jason- [phone]  Hospice will accept patient for in patient care, if his O2 is aroung 6l, will try to titrate down and monitor.

## 2022-02-28 ENCOUNTER — TRANSCRIPTION ENCOUNTER (OUTPATIENT)
Age: 87
End: 2022-02-28

## 2022-02-28 VITALS
HEART RATE: 86 BPM | DIASTOLIC BLOOD PRESSURE: 73 MMHG | RESPIRATION RATE: 18 BRPM | TEMPERATURE: 97 F | SYSTOLIC BLOOD PRESSURE: 126 MMHG | OXYGEN SATURATION: 93 %

## 2022-02-28 LAB
HCT VFR BLD CALC: 27.4 % — LOW (ref 39–50)
HGB BLD-MCNC: 8.6 G/DL — LOW (ref 13–17)
MCHC RBC-ENTMCNC: 27.7 PG — SIGNIFICANT CHANGE UP (ref 27–34)
MCHC RBC-ENTMCNC: 31.4 G/DL — LOW (ref 32–36)
MCV RBC AUTO: 88.4 FL — SIGNIFICANT CHANGE UP (ref 80–100)
NRBC # BLD: 0 /100 WBCS — SIGNIFICANT CHANGE UP (ref 0–0)
PLATELET # BLD AUTO: 146 K/UL — LOW (ref 150–400)
RBC # BLD: 3.1 M/UL — LOW (ref 4.2–5.8)
RBC # FLD: 16.9 % — HIGH (ref 10.3–14.5)
WBC # BLD: 11.47 K/UL — HIGH (ref 3.8–10.5)
WBC # FLD AUTO: 11.47 K/UL — HIGH (ref 3.8–10.5)

## 2022-02-28 PROCEDURE — 99239 HOSP IP/OBS DSCHRG MGMT >30: CPT

## 2022-02-28 RX ORDER — MORPHINE SULFATE 50 MG/1
1 CAPSULE, EXTENDED RELEASE ORAL
Qty: 0 | Refills: 0 | DISCHARGE
Start: 2022-02-28

## 2022-02-28 RX ORDER — ROSUVASTATIN CALCIUM 5 MG/1
1 TABLET ORAL
Qty: 0 | Refills: 0 | DISCHARGE

## 2022-02-28 RX ORDER — MORPHINE SULFATE 50 MG/1
1 CAPSULE, EXTENDED RELEASE ORAL
Refills: 0 | Status: DISCONTINUED | OUTPATIENT
Start: 2022-02-28 | End: 2022-02-28

## 2022-02-28 RX ORDER — MORPHINE SULFATE 50 MG/1
0.5 CAPSULE, EXTENDED RELEASE ORAL EVERY 6 HOURS
Refills: 0 | Status: DISCONTINUED | OUTPATIENT
Start: 2022-02-28 | End: 2022-02-28

## 2022-02-28 RX ORDER — ATORVASTATIN CALCIUM 80 MG/1
1 TABLET, FILM COATED ORAL
Qty: 0 | Refills: 0 | DISCHARGE
Start: 2022-02-28

## 2022-02-28 RX ORDER — ALBUTEROL 90 UG/1
2 AEROSOL, METERED ORAL
Qty: 0 | Refills: 0 | DISCHARGE
Start: 2022-02-28

## 2022-02-28 RX ORDER — CEFEPIME 1 G/1
2 INJECTION, POWDER, FOR SOLUTION INTRAMUSCULAR; INTRAVENOUS
Qty: 0 | Refills: 0 | DISCHARGE
Start: 2022-02-28

## 2022-02-28 RX ORDER — SCOPALAMINE 1 MG/3D
1 PATCH, EXTENDED RELEASE TRANSDERMAL
Qty: 0 | Refills: 0 | DISCHARGE
Start: 2022-02-28

## 2022-02-28 RX ADMIN — ALBUTEROL 2 PUFF(S): 90 AEROSOL, METERED ORAL at 12:27

## 2022-02-28 RX ADMIN — MORPHINE SULFATE 0.5 MILLIGRAM(S): 50 CAPSULE, EXTENDED RELEASE ORAL at 12:27

## 2022-02-28 RX ADMIN — MORPHINE SULFATE 0.5 MILLIGRAM(S): 50 CAPSULE, EXTENDED RELEASE ORAL at 05:26

## 2022-02-28 RX ADMIN — MORPHINE SULFATE 0.5 MILLIGRAM(S): 50 CAPSULE, EXTENDED RELEASE ORAL at 18:36

## 2022-02-28 RX ADMIN — ALBUTEROL 2 PUFF(S): 90 AEROSOL, METERED ORAL at 05:25

## 2022-02-28 RX ADMIN — MORPHINE SULFATE 0.5 MILLIGRAM(S): 50 CAPSULE, EXTENDED RELEASE ORAL at 13:00

## 2022-02-28 RX ADMIN — SCOPALAMINE 1 PATCH: 1 PATCH, EXTENDED RELEASE TRANSDERMAL at 07:55

## 2022-02-28 RX ADMIN — PANTOPRAZOLE SODIUM 40 MILLIGRAM(S): 20 TABLET, DELAYED RELEASE ORAL at 18:36

## 2022-02-28 RX ADMIN — PANTOPRAZOLE SODIUM 40 MILLIGRAM(S): 20 TABLET, DELAYED RELEASE ORAL at 06:08

## 2022-02-28 RX ADMIN — MORPHINE SULFATE 0.5 MILLIGRAM(S): 50 CAPSULE, EXTENDED RELEASE ORAL at 05:25

## 2022-02-28 RX ADMIN — SCOPALAMINE 1 PATCH: 1 PATCH, EXTENDED RELEASE TRANSDERMAL at 05:26

## 2022-02-28 RX ADMIN — CEFEPIME 100 MILLIGRAM(S): 1 INJECTION, POWDER, FOR SOLUTION INTRAMUSCULAR; INTRAVENOUS at 10:57

## 2022-02-28 RX ADMIN — ALBUTEROL 2 PUFF(S): 90 AEROSOL, METERED ORAL at 18:36

## 2022-02-28 NOTE — DISCHARGE NOTE NURSING/CASE MANAGEMENT/SOCIAL WORK - NSDCVIVACCINE_GEN_ALL_CORE_FT
Tdap; 02-Sep-2019 13:05; Tiffanie Izquierdo (MANOHAR); [employer]; [id number] (Exp. Date: 19-Mar-2021); IntraMuscular; Deltoid Left.; 0.5 milliLiter(s); VIS (VIS Published: 09-May-2013, VIS Presented: 02-Sep-2019);

## 2022-02-28 NOTE — CHART NOTE - NSCHARTNOTEFT_GEN_A_CORE
Assessment:  Pt is confused, seen in 2C, on nasal canula.  RN reported that pt had difficulty c swallowing, was holding food in mouth, difficulty c swallowing saliva as well.   Pt adm c acute pulmonary embolism, c metabolic encephalopathy, failure to thrive, cachexia, MSSA bacteremia, mucous plug/right hemithorax opacification, underlying COPD, CHF, AMNA, right hallux and heel wounds, GIB, awaiting Hospice evaluation, pt is DNR/DNI.    PMH include prostate cancer, CHF, HTN.      Factors impacting intake: [ ] none [ ] nausea  [ ] vomiting [ ] diarrhea [ ] constipation  [x ]chewing problems; pt appeared to be edentulous [x ] swallowing issues; 02/17, swallow evaluation c recommendation for puree with moderately thick fluids  [x ] other: AMS    Diet Prescription: Diet, Pureed:   Moderately Thick Liquids (MODTHICKLIQS)  Supplement Feeding Modality:  Oral  Ensure Pudding Cans or Servings Per Day:  1       Frequency:  Three Times a day (02-21-22 @ 14:36)    Intake: 0%    Current Weight: 02/28, 53.1 kg, 02/15, 44.4 kg c wt. gain of 8.7 kg.  % Weight Change: 19.6%  02/25, 2+ edema of right arm noted   Pt c severe depletion of orbital, temple, triceps, clavicle, shoulder, leg, thigh areas    Pertinent Medications: MEDICATIONS  (STANDING):  ALBUTerol    90 MICROgram(s) HFA Inhaler 2 Puff(s) Inhalation every 6 hours  atorvastatin 20 milliGRAM(s) Oral at bedtime  bicalutamide 50 milliGRAM(s) Oral daily  cefepime   IVPB 2000 milliGRAM(s) IV Intermittent every 24 hours  cefepime   IVPB      dextrose 5%. 1000 milliLiter(s) (75 mL/Hr) IV Continuous <Continuous>  morphine  - Injectable 0.5 milliGRAM(s) IV Push every 6 hours  pantoprazole  Injectable 40 milliGRAM(s) IV Push every 12 hours  scopolamine 1 mG/72 Hr(s) Patch 1 Patch Transdermal every 72 hours    MEDICATIONS  (PRN):  morphine  - Injectable 1 milliGRAM(s) IV Push every 3 hours PRN dyspnea or severe pain    Pertinent Labs:  02-15 Chol 85 mg/dL LDL --    HDL 32 mg/dL<L> Trig 72 mg/dL  02-15-22 @ 16:02 a1c 5.5   CAPILLARY BLOOD GLUCOSE        Skin:   Wounds noted x 2  1. left dorsum pf 1st metatarsal  2. right big toe  Pressure ulcer x 3   1. sacrum; stage II  2. right heel; unstageable  3. left trochanter; stage I    Estimated Needs:   [ x] no change since previous assessment(02/16)  [ ] recalculated:     Previous Nutrition Diagnosis:   Malnutrition Severe Malnutrition in context of chronic illness.     Etiology Inadequate protein-energy intake & increased protein-energy needs related to CHF, dementia, prostate cancer, pressure ulcer.   addendum; debility, failure to thrive, altered chew/swallow    Signs/Symptoms Physical signs of severe muscle wasting & fat depletion as noted.     Goal/Expected Outcome Pt to  meet > 75% protein-energy needs via most medically feasible; not met  New Goal: nutrition/hydration as preferred/tolerated     Nutrition Diagnosis is [ x] ongoing  [ ] resolved [ ] not applicable       New Nutrition Diagnosis: [x ] not applicable     Interventions:   Recommend  [x ] Change Diet To: NPO  [ ] Nutrition Supplement  [ ] Nutrition Support  [x ] Other: swallow re-evaluation     Monitoring and Evaluation:   [ ] PO intake [ x ] Tolerance to diet prescription [ x ] weights [ x ] labs[ x ] follow up per protocol  [ ] other:
Right foot x-ray no gas, no OM  Pod stable for discharge, info in discharge paperwork
MANOHAR Holbrook requested phlebotomy work following failed blood draw by 2 phlebotomists. Venous blood draw taken from patient Left arm via butterfly. Patient tolerated procedure well.
Pt seen for follow-up on medical floor w/ metabolic encephalopathy ; Hx FTT ; Bacteremia ; Acute pulmonary embolus ; RLL pneumonia, mucus plug, underlying COPD, CTA ; Troponin level elevated ; Chronic CHF ( lasix on hold) ; AMNA ; Prostate cancer ; Right hallux & heel wounds ; DNR / DNI ; Severe Protein calorie Malnutrition of Chronic Illness.     Factors impacting intake: [ ] none [ ] nausea  [ ] vomiting [ ] diarrhea [ ] constipation  [ ]chewing problems [ ] swallowing issues  [x ] other: Persistent decreased p.o. intake    2/17 - Diet Prescription: Diet, Pureed:   Moderately Thick Liquids (MODTHICKLIQS) (02-17-22 @ 11:08)    Intake: 0-25 % total feed    Current Weight: 2/18 - 97.4 (44.2 kg) 2/16 - 97.4 (44.2 kg)   % Weight Change - stable -> no edema noted      Pertinent Medications: MEDICATIONS  (STANDING):  ALBUTerol    90 MICROgram(s) HFA Inhaler 2 Puff(s) Inhalation every 6 hours  atorvastatin 20 milliGRAM(s) Oral at bedtime  bicalutamide 50 milliGRAM(s) Oral daily  cefepime   IVPB 2000 milliGRAM(s) IV Intermittent every 24 hours  cefepime   IVPB      dextrose 5% + lactated ringers. 1000 milliLiter(s) (50 mL/Hr) IV Continuous <Continuous>  enoxaparin Injectable 50 milliGRAM(s) SubCutaneous daily  pantoprazole    Tablet 40 milliGRAM(s) Oral before breakfast  scopolamine 1 mG/72 Hr(s) Patch 1 Patch Transdermal every 72 hours    MEDICATIONS  (PRN):    Pertinent Labs: 02-21 Na148 mmol/L<H> Glu 88 mg/dL K+ 4.0 mmol/L Cr  1.15 mg/dL BUN 18 mg/dL 02-19 Phos 4.8 mg/dL<H> 02-20 Alb 2.0 g/dL<L> 02-15 Chol 85 mg/dL LDL --    HDL 32 mg/dL<L> Trig 72 mg/dL02-20 ALT 14 U/L AST 94 U/L<H> Alkaline Phosphatase 74 U/L02-15-22 @ 16:02 A1C 5.5       CAPILLARY BLOOD GLUCOSE        Skin: - tear L buttock  R heal unstageable  L trochanter - stage 1    Estimated Needs:   [x ] no change since previous assessment ( 2/16/22 )  [ ] recalculated:     Previous Nutrition Diagnosis:     Nutrition Diagnosis:    Nutrition Diagnosis:  Nutrition diagnosis yes...     Nutrition Diagnostic Terminology #1 Malnutrition...     Malnutrition Severe Malnutrition in context of chronic illness.     Etiology Inadequate protein-energy intake & increased protein-energy needs related to CHF, dementia, prostate cancer, advanced age & pressure ulcer.     Signs/Symptoms Physical signs of severe muscle wasting & fat depletion as noted.     Goal/Expected Outcome Pt to  meet > 75% protein-energy needs via most medically feasible route.-> NOT MET      Nutrition Diagnosis is [x ] ongoing  [ ] resolved [ ] not applicable     New Nutrition Diagnosis: [x ] not applicable       Interventions:   Recommend  [ x ] Continue current diet as Rx'd -> pureed ; moderately thick  [ ] Change Diet To:  [x ] Nutrition Supplement - Recommend: Ensure Pudding 4 oz 3x/day (510 benja, 12 gm pro)   [ ] Nutrition Support  [ ] Other:     Monitoring and Evaluation:   [x ] PO intake [ x ] Tolerance to diet prescription [ x ] weights [ x ] labs[ x ] follow up per protocol  [ ] other:

## 2022-02-28 NOTE — PROGRESS NOTE ADULT - ASSESSMENT
89M with a PMH of CHF?, prostate Ca who presents to the ED for dyspnea.  Treated for pneumonia, MSSA bacteremia.  Hospital course complicated by elevated troponin, Acute pulmonary embolism, and acute blood loss anemia.  Pt's son opted for conservative management, citing pt's multiple comorbid conditions.  Pt was accepted to hospice.    # Acute Respiratory Failure with Hypoxia / COPD / Acute Pulmonary Embolism - not on anticoagulation due to acute blood loss.  Conservative management / hospice per son's wishes.  - confirmed - DNR and DNI.  Morphine prn dyspnea.  # RLL Pneumonia, MSSA bacteremia - Per ID recommendations, IV Cefepime until 3/29, can stop if consistent with family's wishes.  # Elevated Troponin - no further workup per son.  # Functional Quadriplegia - supportive care.  # Failure to thrive / Prostate cancer - supportive care  # Acute Blood Loss Anemia - pt's son had refused EGD.  PPI.  Supportive care.    Plan of care d/w Son- Eren- [phone].  Confirmed above.   Stable for d/c to Inpatient Hospice.

## 2022-02-28 NOTE — PROGRESS NOTE ADULT - PROVIDER SPECIALTY LIST ADULT
Cardiology
Cardiology
Infectious Disease
Infectious Disease
Internal Medicine
Palliative Care
Pulmonology
Pulmonology
Cardiology
Hospitalist
Infectious Disease
Pulmonology
Hospitalist
Infectious Disease
Infectious Disease
Internal Medicine
Pulmonology
Hospitalist
Infectious Disease
Infectious Disease
Hospitalist
Palliative Care

## 2022-02-28 NOTE — PROGRESS NOTE ADULT - NUTRITIONAL ASSESSMENT
This patient has been assessed with a concern for Malnutrition and has been determined to have a diagnosis/diagnoses of Severe protein-calorie malnutrition and Underweight (BMI < 19).    This patient is being managed with:   Diet Pureed-  Moderately Thick Liquids (MODTHICKLIQS)  Entered: Feb 17 2022 11:08AM    
This patient has been assessed with a concern for Malnutrition and has been determined to have a diagnosis/diagnoses of Severe protein-calorie malnutrition and Underweight (BMI < 19).    This patient is being managed with:   Diet Pureed-  Moderately Thick Liquids (MODTHICKLIQS)  Entered: Feb 17 2022 11:08AM    
This patient has been assessed with a concern for Malnutrition and has been determined to have a diagnosis/diagnoses of Severe protein-calorie malnutrition and Underweight (BMI < 19).    This patient is being managed with:   Diet Pureed-  Moderately Thick Liquids (MODTHICKLIQS)  Supplement Feeding Modality:  Oral  Ensure Pudding Cans or Servings Per Day:  1       Frequency:  Three Times a day  Entered: Feb 21 2022  2:36PM    
This patient has been assessed with a concern for Malnutrition and has been determined to have a diagnosis/diagnoses of Severe protein-calorie malnutrition and Underweight (BMI < 19).    This patient is being managed with:   Diet Pureed-  Moderately Thick Liquids (MODTHICKLIQS)  Entered: Feb 17 2022 11:08AM    
This patient has been assessed with a concern for Malnutrition and has been determined to have a diagnosis/diagnoses of Severe protein-calorie malnutrition and Underweight (BMI < 19).    This patient is being managed with:   Diet Pureed-  Moderately Thick Liquids (MODTHICKLIQS)  Entered: Feb 17 2022 11:08AM    
This patient has been assessed with a concern for Malnutrition and has been determined to have a diagnosis/diagnoses of Severe protein-calorie malnutrition and Underweight (BMI < 19).    This patient is being managed with:   Diet Pureed-  Moderately Thick Liquids (MODTHICKLIQS)  Supplement Feeding Modality:  Oral  Ensure Pudding Cans or Servings Per Day:  1       Frequency:  Three Times a day  Entered: Feb 21 2022  2:36PM    
This patient has been assessed with a concern for Malnutrition and has been determined to have a diagnosis/diagnoses of Severe protein-calorie malnutrition and Underweight (BMI < 19).    This patient is being managed with:   Diet NPO-  Except Medications  Entered: Feb 15 2022 11:08AM    
This patient has been assessed with a concern for Malnutrition and has been determined to have a diagnosis/diagnoses of Severe protein-calorie malnutrition and Underweight (BMI < 19).    This patient is being managed with:   Diet Pureed-  Moderately Thick Liquids (MODTHICKLIQS)  Entered: Feb 17 2022 11:08AM    
This patient has been assessed with a concern for Malnutrition and has been determined to have a diagnosis/diagnoses of Severe protein-calorie malnutrition and Underweight (BMI < 19).    This patient is being managed with:   Diet Pureed-  Moderately Thick Liquids (MODTHICKLIQS)  Supplement Feeding Modality:  Oral  Ensure Pudding Cans or Servings Per Day:  1       Frequency:  Three Times a day  Entered: Feb 21 2022  2:36PM    

## 2022-02-28 NOTE — DISCHARGE NOTE NURSING/CASE MANAGEMENT/SOCIAL WORK - NSDCPEFALRISK_GEN_ALL_CORE
For information on Fall & Injury Prevention, visit: [email or web address] OR  [email or web address] OR  https://www.cdc.gov/steadi/patient.html

## 2022-02-28 NOTE — PROGRESS NOTE ADULT - SUBJECTIVE AND OBJECTIVE BOX
Patient: SANDRA ROCHE [id number] 89y Male                            Hospitalist Attending Note    Pt arousable, confused.     ____________________PHYSICAL EXAM:  GENERAL:  NAD Arousable, confused.   HEENT: NCAT  CARDIOVASCULAR:  S1, S2  LUNGS: Coarse BS b/l.   ABDOMEN:  soft, (-) tenderness, (-) distension, (+) bowel sounds, (-) guarding, (-) rebound (-) rigidity  EXTREMITIES:  no cyanosis / clubbing / edema.   ____________________     VITALS:  Vital Signs Last 24 Hrs  T(C): 36.6 (2022 11:36), Max: 37.3 (2022 04:40)  T(F): 97.9 (2022 11:36), Max: 99.1 (2022 04:40)  HR: 90 (2022 11:36) (47 - 91)  BP: 103/76 (2022 11:36) (91/58 - 118/60)  BP(mean): --  RR: 18 (2022 11:36) (18 - 18)  SpO2: 100% (2022 11:36) (93% - 100%) Daily     Daily Weight in k.1 (2022 04:40)  CAPILLARY BLOOD GLUCOSE        I&O's Summary    2022 07:01  -  2022 07:00  --------------------------------------------------------  IN: 0 mL / OUT: 700 mL / NET: -700 mL        HISTORY:  PAST MEDICAL & SURGICAL HISTORY:  HTN (hypertension)    CHF (congestive heart failure)    Prostate CA    History of tonsillectomy    Allergies    No Known Allergies    Intolerances       LABS:                        8.6    11.47 )-----------( 146      ( 2022 07:46 )             27.4                         MEDICATIONS:  MEDICATIONS  (STANDING):  ALBUTerol    90 MICROgram(s) HFA Inhaler 2 Puff(s) Inhalation every 6 hours  atorvastatin 20 milliGRAM(s) Oral at bedtime  bicalutamide 50 milliGRAM(s) Oral daily  cefepime   IVPB      cefepime   IVPB 2000 milliGRAM(s) IV Intermittent every 24 hours  dextrose 5%. 1000 milliLiter(s) (75 mL/Hr) IV Continuous <Continuous>  morphine  - Injectable 0.5 milliGRAM(s) IV Push every 6 hours  pantoprazole  Injectable 40 milliGRAM(s) IV Push every 12 hours  scopolamine 1 mG/72 Hr(s) Patch 1 Patch Transdermal every 72 hours    MEDICATIONS  (PRN):  morphine  - Injectable 1 milliGRAM(s) IV Push every 3 hours PRN dyspnea or severe pain

## 2022-02-28 NOTE — HOSPICE CARE NOTE - CONVESATION DETAILS
[facility]  MAU called and spoke to Libia in admissions. Patient is accepted, there is a bed available for today, Libia requested 5pm transfer. DR Kimble also ok'd as long as pt is not on over 10L O2. MAU called and spoke to [facility] MAU who asked floor RN and patient is currently on 5L at 3:24pm. [facility] MAU told floor RN that pt is to be transferred to [facility] today at 5pm. MAU called and spoke with son Eren who agrees.  All parties aware of and agree with plan.  Roseline Blackburn LMSW   
Pt is medically approved for IP hospice care.  Called son to discuss.  He is in agreement w POC.  Emailed [facility] Consent forms to:  [email or web address].  Eren will call me when he gets home tonight to review the forms.

## 2022-02-28 NOTE — DISCHARGE NOTE NURSING/CASE MANAGEMENT/SOCIAL WORK - PATIENT PORTAL LINK FT
You can access the FollowMyHealth Patient Portal offered by [facility] by registering at the following website: [email or web address]. By joining [facility]’s FollowMyHealth portal, you will also be able to view your health information using other applications (apps) compatible with our system.

## 2022-03-02 DIAGNOSIS — I96 GANGRENE, NOT ELSEWHERE CLASSIFIED: ICD-10-CM

## 2022-03-02 DIAGNOSIS — I87.2 VENOUS INSUFFICIENCY (CHRONIC) (PERIPHERAL): ICD-10-CM

## 2022-03-02 DIAGNOSIS — Z99.3 DEPENDENCE ON WHEELCHAIR: ICD-10-CM

## 2022-03-02 DIAGNOSIS — I26.99 OTHER PULMONARY EMBOLISM WITHOUT ACUTE COR PULMONALE: ICD-10-CM

## 2022-03-02 DIAGNOSIS — R79.89 OTHER SPECIFIED ABNORMAL FINDINGS OF BLOOD CHEMISTRY: ICD-10-CM

## 2022-03-02 DIAGNOSIS — I50.9 HEART FAILURE, UNSPECIFIED: ICD-10-CM

## 2022-03-02 DIAGNOSIS — J96.01 ACUTE RESPIRATORY FAILURE WITH HYPOXIA: ICD-10-CM

## 2022-03-02 DIAGNOSIS — N17.9 ACUTE KIDNEY FAILURE, UNSPECIFIED: ICD-10-CM

## 2022-03-02 DIAGNOSIS — B95.61 METHICILLIN SUSCEPTIBLE STAPHYLOCOCCUS AUREUS INFECTION AS THE CAUSE OF DISEASES CLASSIFIED ELSEWHERE: ICD-10-CM

## 2022-03-02 DIAGNOSIS — I11.0 HYPERTENSIVE HEART DISEASE WITH HEART FAILURE: ICD-10-CM

## 2022-03-02 DIAGNOSIS — Z87.891 PERSONAL HISTORY OF NICOTINE DEPENDENCE: ICD-10-CM

## 2022-03-02 DIAGNOSIS — R78.81 BACTEREMIA: ICD-10-CM

## 2022-03-02 DIAGNOSIS — E87.6 HYPOKALEMIA: ICD-10-CM

## 2022-03-02 DIAGNOSIS — R53.2 FUNCTIONAL QUADRIPLEGIA: ICD-10-CM

## 2022-03-02 DIAGNOSIS — Z53.20 PROCEDURE AND TREATMENT NOT CARRIED OUT BECAUSE OF PATIENT'S DECISION FOR UNSPECIFIED REASONS: ICD-10-CM

## 2022-03-02 DIAGNOSIS — J18.9 PNEUMONIA, UNSPECIFIED ORGANISM: ICD-10-CM

## 2022-03-02 DIAGNOSIS — E87.2 ACIDOSIS: ICD-10-CM

## 2022-03-02 DIAGNOSIS — E43 UNSPECIFIED SEVERE PROTEIN-CALORIE MALNUTRITION: ICD-10-CM

## 2022-03-02 DIAGNOSIS — R47.01 APHASIA: ICD-10-CM

## 2022-03-02 DIAGNOSIS — G93.41 METABOLIC ENCEPHALOPATHY: ICD-10-CM

## 2022-03-02 DIAGNOSIS — C61 MALIGNANT NEOPLASM OF PROSTATE: ICD-10-CM

## 2022-03-02 DIAGNOSIS — M41.9 SCOLIOSIS, UNSPECIFIED: ICD-10-CM

## 2022-03-02 DIAGNOSIS — D62 ACUTE POSTHEMORRHAGIC ANEMIA: ICD-10-CM

## 2022-03-02 DIAGNOSIS — E78.5 HYPERLIPIDEMIA, UNSPECIFIED: ICD-10-CM

## 2022-03-02 DIAGNOSIS — E87.0 HYPEROSMOLALITY AND HYPERNATREMIA: ICD-10-CM

## 2022-03-02 DIAGNOSIS — Z66 DO NOT RESUSCITATE: ICD-10-CM

## 2022-03-02 DIAGNOSIS — K29.71 GASTRITIS, UNSPECIFIED, WITH BLEEDING: ICD-10-CM

## 2022-03-02 DIAGNOSIS — J98.11 ATELECTASIS: ICD-10-CM

## 2022-03-02 DIAGNOSIS — E86.0 DEHYDRATION: ICD-10-CM

## 2022-03-02 DIAGNOSIS — J43.9 EMPHYSEMA, UNSPECIFIED: ICD-10-CM

## 2022-03-04 NOTE — PHYSICAL THERAPY INITIAL EVALUATION ADULT - OCCUPATION
RXN  Patient:Rober Gonzalez  Attending Provider:No att. providers found    Jeremy August made the decision to leave the emergency department independently without the approval of the ED , or other emergency department staff. Patient presented with  mother to ED. retired

## 2022-04-25 NOTE — PROVIDER CONTACT NOTE (CRITICAL VALUE NOTIFICATION) - DATE AND TIME:
19-Feb-2022 11:30 19-Feb-2022 11:31 Thalidomide Counseling: I discussed with the patient the risks of thalidomide including but not limited to birth defects, anxiety, weakness, chest pain, dizziness, cough and severe allergy.

## 2022-07-08 NOTE — PROGRESS NOTE ADULT - SUBJECTIVE AND OBJECTIVE BOX
SUBJECTIVE AND OBJECTIVE: Patient in bed on NRB, opens eyes to voice, not speaking to me today  INTERVAL HPI/OVERNIGHT EVENTS:    DNR on chart: yes  Allergies    No Known Allergies    Intolerances    MEDICATIONS  (STANDING):  ALBUTerol    90 MICROgram(s) HFA Inhaler 2 Puff(s) Inhalation every 6 hours  atorvastatin 20 milliGRAM(s) Oral at bedtime  bicalutamide 50 milliGRAM(s) Oral daily  cefepime   IVPB 2000 milliGRAM(s) IV Intermittent every 24 hours  cefepime   IVPB      dextrose 5%. 1000 milliLiter(s) (75 mL/Hr) IV Continuous <Continuous>  morphine  - Injectable 0.5 milliGRAM(s) IV Push every 6 hours  pantoprazole Infusion 8 mG/Hr (10 mL/Hr) IV Continuous <Continuous>  scopolamine 1 mG/72 Hr(s) Patch 1 Patch Transdermal every 72 hours    MEDICATIONS  (PRN):  morphine  - Injectable 1 milliGRAM(s) IV Push every 3 hours PRN dyspnea or severe pain      ITEMS UNCHECKED ARE NOT PRESENT    PRESENT SYMPTOMS: [x ]Unable to obtain due to poor mentation   Source if other than patient:  [ ]Family   [ ]Team     Pain:  [ ]yes [ ]no  QOL impact -   Location -                    Aggravating factors -  Quality -  Radiation -  Timing-  Severity (0-10 scale):  Minimal acceptable level (0-10 scale):     Dyspnea:                           [ ]Mild [ ]Moderate [ ]Severe  Anxiety:                             [ ]Mild [ ]Moderate [ ]Severe  Fatigue:                             [ ]Mild [ ]Moderate [ ]Severe  Nausea:                             [ ]Mild [ ]Moderate [ ]Severe  Loss of appetite:              [ ]Mild [ ]Moderate [ ]Severe  Constipation:                    [ ]Mild [ ]Moderate [ ]Severe    PAIN AD Score:	2  http://geriatrictoolkit.[place].[facility]/cog/painad.pdf (Ctrl + left click to view)    Other Symptoms:  [ ]All other review of systems negative     Palliative Performance Status Version 2:      10   %      http://npcrc.org/files/news/palliative_performance_scale_ppsv2.pdf  PHYSICAL EXAM:  Vital Signs Last 24 Hrs  T(C): 36.2 (24 Feb 2022 10:21), Max: 36.3 (24 Feb 2022 00:49)  T(F): 97.2 (24 Feb 2022 10:21), Max: 97.4 (24 Feb 2022 00:49)  HR: 62 (24 Feb 2022 10:21) (58 - 93)  BP: 100/62 (24 Feb 2022 10:21) (95/58 - 103/65)  BP(mean): --  RR: 18 (24 Feb 2022 10:21) (16 - 18)  SpO2: 97% (24 Feb 2022 10:21) (90% - 100%) I&O's Summary    23 Feb 2022 07:01  -  24 Feb 2022 07:00  --------------------------------------------------------  IN: 0 mL / OUT: 1 mL / NET: -1 mL       GENERAL:  [ ]Alert  [ ]Oriented x   [x ]Lethargic  [ ]Cachexia  [ ]Unarousable  [ ]Verbal  [x ]Non-Verbal  Behavioral:   [ ]Anxiety  [ ]Delirium [ ]Agitation [ ]Other  HEENT:  [ ]Normal   [x ]Dry mouth   [ ]ET Tube/Trach  [ ]Oral lesions  PULMONARY:   [ ]Clear [ ]Tachypnea  [ ]Audible excessive secretions   [ ]Rhonchi        [ ]Right [ ]Left [ ]Bilateral  [ ]Crackles        [ ]Right [ ]Left [ ]Bilateral  [ ]Wheezing     [ ]Right [ ]Left [ ]Bilateral  [ ]Diminished BS [ ] Right [ ]Left [ ]Bilateral  CARDIOVASCULAR:    [x ]Regular [ ]Irregular [ ]Tachy  [ ]Rudolph [ ]Murmur [ ]Other  GASTROINTESTINAL:  [ x]Soft  [ ]Distended   [ ]+BS  [ ]Non tender [ ]Tender  [ ]PEG [ ]OGT/ NGT   Last BM:  2/23/22  GENITOURINARY:  [ ]Normal [ x]Incontinent   [ ]Oliguria/Anuria   [ ]Lutz  MUSCULOSKELETAL:   [ ]Normal   [ ]Weakness  [x ]Bed/Wheelchair bound [ ]Edema  NEUROLOGIC:   [ ]No focal deficits  [x ] Cognitive impairment  [ ] Dysphagia [ ]Dysarthria [ ] Paresis [ ]Other   SKIN:   [ ]Normal  [ ]Rash   [ x]Pressure ulcer(s) right great toe unstageable, right heel unstageable, sacrum healed [ ]y [ ]n present on admission    CRITICAL CARE:  [ ]Shock Present  [ ]Septic [ ]Cardiogenic [ ]Neurologic [ ]Hypovolemic  [ ]Vasopressors [ ]Inotropes  [ ]Respiratory failure present [ ]Mechanical Ventilation [ ]Non-invasive ventilatory support [ ]High-Flow  [ ]Acute  [ ]Chronic [ ]Hypoxic  [ ]Hypercarbic [ ]Other  [ ]Other organ failure     LABS:                        9.4    10.28 )-----------( 157      ( 24 Feb 2022 04:23 )             30.1   02-24    143  |  115<H>  |  18  ----------------------------<  98  3.3<L>   |  22  |  0.93    Ca    7.8<L>      24 Feb 2022 04:23      RADIOLOGY & ADDITIONAL STUDIES: none new    Protein Calorie Malnutrition Present: [ ]mild [ x]moderate [ ]severe [ ]underweight [ ]morbid obesity  https://www.andeal.org/vault/2440/web/files/ONC/Table_Clinical%20Characteristics%20to%20Document%20Malnutrition-White%20JV%20et%20al%842539.pdf    Height (cm): 167.6 (02-13-22 @ 17:02), 167.6 (09-27-21 @ 11:29), 167.6 (04-12-21 @ 12:23)  Weight (kg): 44.4 (02-14-22 @ 01:14), 63.5 (09-27-21 @ 11:29), 40.8 (04-12-21 @ 12:23)  BMI (kg/m2): 15.8 (02-14-22 @ 01:14), 22.6 (02-13-22 @ 17:02), 22.6 (09-27-21 @ 11:29)    [x ]PPSV2 < or = 30%  [ ]significant weight loss [ x]poor nutritional intake [ ]anasarca    [ ]Artificial Nutrition    REFERRALS:   [ ]Chaplaincy  [ ]Hospice  [ ]Child Life  [ ]Social Work  [ ]Case management [ ]Holistic Therapy     Goals of Care Document:     none

## 2022-08-26 NOTE — ED ADULT NURSE REASSESSMENT NOTE - NS ED NURSE REASSESS COMMENT FT1
pt son arrived for p/u  discharge instruction given pt to f/u with PMD in 3 day for f/u care Applied

## 2022-12-13 NOTE — ED ADULT TRIAGE NOTE - WEIGHT IN KG
70.3 Purse String (Simple) Text: Given the location of the defect and the characteristics of the surrounding skin a purse string closure was deemed most appropriate.  Undermining was performed circumfirentially around the surgical defect.  A purse string suture was then placed and tightened.

## 2023-12-18 NOTE — DISCHARGE NOTE ADULT - MONITOR YOUR WEIGHT DAILY. CALL YOUR DOCTOR FOR A WEIGHT GAIN OF 2-3 LBS. IN 24 HRS OR 3 LBS. OR MORE IN 1 WEEK, INCREASED SHORTNESS OF BREATH, TIREDNESS OR WEAKNESS, OR INCREASED SWELLING OF YOUR FEET AND LEGS
Ludy told her that if she liked the venlafaxine that she would call more in for her.    Statement Selected

## 2023-12-29 NOTE — ED PROVIDER NOTE - NEURO NEGATIVE STATEMENT, MLM
Patient came in with mom to have the flu vaccine. Flu vaccine administered and tolerated well.  
no loss of consciousness, no gait abnormality, no headache, no sensory deficits, and no weakness.

## 2024-06-04 NOTE — PROGRESS NOTE ADULT - ASSESSMENT
Procedure Order to Urology [[id number]]    Electronically signed by: Harpal Bhandari MD on 06/03/24 1607 Status: Active   Ordering user: Harpal Bhandari MD 06/03/24 1607 Authorized by: Harpal Bhandari MD   Ordering mode: Standard   Frequency:  06/03/24 -     Diagnoses  Consultation for sterilization [Z30.09]   Questionnaire    Question Answer   Procedure Vasectomy Comment - 7/12 [facility]   Facility Name: [facility]   Out patient [facility], Please order IV, Ancef 2 gram ( alternative for PCN allergy is Cipro 400mg IV ) General Anesthesia.        a 89M with a PMH of CHF?, prostate Ca who presents to the ED for dyspnea.  Patient currently AAOx1, unable to provide history.  Patient reportedly noted to have decreased appetite and wheezing by his HHA today who called EMS.  No current complaints.  SpO2 currently 96 on 4L via NC.  Vitals stable, labs show mild leukocytosis.  Will admit to tele.         Metabolic encephalopathy.    Hx of failure to thrive   ·  Plan: Unclear baseline mental status.  Patient presents with hypothermia,   As per son- was congested/ not eating much   Usually pt is bedbound on a wheelchair for most of the day-mainly due to general weakness for over more than 6months, has dementia  Hx of shin wound- and was told not to move around too much for the wound to heal     RLL infiltrate on CXR (unofficially), UA negative  -swallow eval pending  -lactic acid was very high-decreased now up again  -continue fluids and antibiotics      Acute pulmonary embolus.   continue heparin drip.  Will need to switch to a DOAC but will need to estimate risks/vs benefits given hx of gastric ulcers     RLL Pneumonia.   Underlying COPD  continue ceftriaxone, azithromycin.  -on nonrebreather now   -Pulm consult pending     Troponin level elevated.   ·  Plan: Troponin elevated.  Will trend  No current chest pain.   As per Cardiology - "no concomitant evidence of acute ischemia clinically or on ECG - likely type II MI in setting of CAP/PE."  ASA relatively contraindicated due to recurrent GI bleed in the past  Bbl relatively contraindicated due to emphysema, smoking history  started on Statin   -ECHO ordered      Chronic CHF.   ·  Plan: spironolactone.     Lactic acidosis.   ·  Plan: Lactate elevated, trending down, now up again  Will continue to monitor.  Fluids  -will do abg stat     AMNA (acute kidney injury).   ·  Plan: Cr 2.0 from 1.1,   Given 1.5L bolus, will start maintenance fluids  f/u labs.    : Hypernatremia.   ·  Plan: fluids as per above.        Prostate cancer.   ·  Plan: bicalutamide.              Stephane Jason- [phone]-updated  GOC- DNI but still wants resuscitation      a 89M with a PMH of CHF?, prostate Ca who presents to the ED for dyspnea.  Patient currently AAOx1, unable to provide history.  Patient reportedly noted to have decreased appetite and wheezing by his HHA today who called EMS.  No current complaints.  SpO2 currently 96 on 4L via NC.  Vitals stable, labs show mild leukocytosis.  Will admit to tele.         Metabolic encephalopathy.    Hx of failure to thrive   ·  Plan: Unclear baseline mental status.  Patient presents with hypothermia,   As per son- was congested/ not eating much   Usually pt is bedbound on a wheelchair for most of the day-mainly due to general weakness for over more than 6months, has dementia  Hx of shin wound- and was told not to move around too much for the wound to heal   -swallow eval pending, pt still nppo with D5 only  -lactic acid was very high-decreased   -continue fluids and antibiotics     Bacteremia  -continue IV antibiotics-switched to Zosyn  Possible source is toe-right hallux infection - will have Podiatry evaluate further     Acute pulmonary embolus.   continue heparin drip.  Will need to switch to a DOAC but will need to estimate risks/vs benefits given hx of gastric ulcers and hx of bleeding in past     RLL Pneumonia.   Underlying COPD  continue Zosyn   -on nonrebreather now   -Pulm consult pending     Troponin level elevated.   ·  Plan: Troponin elevated.  Will trend  No current chest pain.   As per Cardiology - "no concomitant evidence of acute ischemia clinically or on ECG - likely type II MI in setting of CAP/PE."  ASA relatively contraindicated due to recurrent GI bleed in the past  Bbl relatively contraindicated due to emphysema, smoking history  started on Statin   -ECHO ordered      Chronic CHF.   ·  Plan: spironolactone (on hold)   Holding Lasix and aldactone now in setting of hypernatremia and dehydration   -awaiting echo      Lactic acidosis.   ·  Plan: Lactate elevated, trending down  Most likely from bacteremia  -continue fluids     AMNA (acute kidney injury).   -fluids  -Nephrology consult     : Hypernatremia.   ·  Plan: fluids as per above.        Prostate cancer.   ·  Plan: bicalutamide.              Stephane Jason- [phone]-updated  [facility]- [place] signed--one time attempt for CPR only- then DNR/DNI

## 2024-06-13 NOTE — CONSULT NOTE ADULT - PROBLEM SELECTOR PROBLEM 7
Noam Easley is a 67 year old male  Denies known Latex allergy or symptoms of Latex sensitivity.  Allergies reviewed.   Medications verified and changes made with Patient's assistance.  Prescription benefits verified.  Preferred Pharmacy verified.  Weight taken with shoes OFF    Advanced Directives on file ? No      Tobacco History reviewed & updated with Pt.    Patient 's reason for appointment is: Office Visit and  Symptoms (Painful urination started 5 days ago  with low abdominal pain starting last night )       Health Maintenance       Diabetes Eye Exam (Yearly)  Never done    Diabetes Foot Exam (Yearly)  Never done    Hepatitis C Screening (Once)  Never done    COVID-19 Vaccine (11 - 2023-24 season)  Overdue since 5/22/2024    Diabetes A1C (Every 6 Months)  Due soon on 6/19/2024           Following review of the above:  Patient wishes to discuss with clinician: Diabetes Eye Exam, Hepatitis C Screening, and COVID-19    Note: Refer to final orders and clinician documentation.          Patient would like communication of their results via:  [place]    Cell Phone:   Telephone Information:   Mobile [phone]     Okay to leave a message containing results? Yes           Encounter for palliative care

## 2025-06-18 NOTE — H&P ADULT - HISTORY OF PRESENT ILLNESS
Attempted to contact patient but phone went to voicemail.  Message left to contact office with callback number.        89 years old male who comes to ER for syncope episode. Anemic, positive  stool occult blood test

## 2025-06-30 NOTE — ED PROVIDER NOTE - CARDIAC CAPILLARY REFILL
Midline placement        Placed:  6/30/20256/30/2025.  [id number].  Earliest Known Present:  6/30/20256/30/2025.  Hand Hygiene Completed:  YesYes.  Catheter Time Out Checklist Completed:  YesYes.  Size (Fr):  33.  Lumen Type:  Single lumenSingle lumen.  Catheter to Vein Ratio Less Than 45%:  YesYes.  Total Length (cm):  1010.  External Length (cm):  00.  Orientation:  RightRight.  Location:  Basilic veinBasilic vein.  Site Prep:  Chlorhexidine ; Usual sterile procedure followedChlorhexidine ; Usual sterile procedure followed.  Local Anesthetic:  InjectableInjectable.  Indication:  Parenteral medicationsParenteral medications.  Insertion Team Members In The Room:  NurseNmonserrat.  Initial Extremity Circumference (cm):  2828.  Placed by:  Lolis Bernal RN-Yokasta Bernal RN-ROBB.  Insertion attempts:  11.  Patient Tolerance:  Tolerated wellTolerated well.  Comfort Measures:  Subcutaneous anestheticSubcutaneous anesthetic.  Procedure Location:  BedsideBedside.  Safety Measures:  Patient specific safety measures addressed with RNPatient specific safety measures addressed with RN. Has comment.  Estimated Blood Loss (mL):  00.  Vessel Fully Compressible Proximally and Distally to Insertion Site:  YesYes.  Brisk Blood Return Obtained and Line Draws Easily:  YesYes.  Catheter Tip Location:  Peripheral/midlinePeripheral/midline. Has comment.  Line Confirmation:  Blood return; Non-pulsatile blood flowBlood return; Non-pulsatile blood flow.  Lot #:  [id number].  :  BDBD.  Expiration Date:  9/30/20259/30/2025.  Securement Method:  Skin barrier; Stat lock; Transparent dressingSkin barrier; Stat lock; Transparent dressing.  Post Procedure Checklist:  Handoff with RN; Bed at lowest level and wheels locked; Obtain all new IV tubing prior to useHandoff with RN; Bed at lowest level and wheels locked; Obtain all new IV tubing prior to use.            
less than or equal to 2 seconds